# Patient Record
Sex: FEMALE | Race: BLACK OR AFRICAN AMERICAN | NOT HISPANIC OR LATINO | Employment: OTHER | URBAN - METROPOLITAN AREA
[De-identification: names, ages, dates, MRNs, and addresses within clinical notes are randomized per-mention and may not be internally consistent; named-entity substitution may affect disease eponyms.]

---

## 2017-03-28 ENCOUNTER — OFFICE VISIT - HEALTHEAST (OUTPATIENT)
Dept: INTERNAL MEDICINE | Facility: CLINIC | Age: 82
End: 2017-03-28

## 2017-03-28 DIAGNOSIS — K04.7 DENTAL INFECTION: ICD-10-CM

## 2017-03-28 DIAGNOSIS — M54.2 CERVICALGIA: ICD-10-CM

## 2017-03-28 DIAGNOSIS — R59.1 LYMPHADENOPATHY: ICD-10-CM

## 2017-03-28 DIAGNOSIS — I10 ESSENTIAL HYPERTENSION WITH GOAL BLOOD PRESSURE LESS THAN 140/90: ICD-10-CM

## 2017-03-29 ENCOUNTER — COMMUNICATION - HEALTHEAST (OUTPATIENT)
Dept: INTERNAL MEDICINE | Facility: CLINIC | Age: 82
End: 2017-03-29

## 2017-05-02 ENCOUNTER — COMMUNICATION - HEALTHEAST (OUTPATIENT)
Dept: INTERNAL MEDICINE | Facility: CLINIC | Age: 82
End: 2017-05-02

## 2017-05-02 DIAGNOSIS — H40.9 GLAUCOMA: ICD-10-CM

## 2017-05-03 ENCOUNTER — COMMUNICATION - HEALTHEAST (OUTPATIENT)
Dept: INTERNAL MEDICINE | Facility: CLINIC | Age: 82
End: 2017-05-03

## 2017-05-03 DIAGNOSIS — H40.9 GLAUCOMA: ICD-10-CM

## 2017-05-26 ENCOUNTER — COMMUNICATION - HEALTHEAST (OUTPATIENT)
Dept: INTERNAL MEDICINE | Facility: CLINIC | Age: 82
End: 2017-05-26

## 2017-05-26 DIAGNOSIS — G47.00 INSOMNIA, UNSPECIFIED: ICD-10-CM

## 2017-06-01 ENCOUNTER — COMMUNICATION - HEALTHEAST (OUTPATIENT)
Dept: SCHEDULING | Facility: CLINIC | Age: 82
End: 2017-06-01

## 2017-06-05 ENCOUNTER — OFFICE VISIT - HEALTHEAST (OUTPATIENT)
Dept: INTERNAL MEDICINE | Facility: CLINIC | Age: 82
End: 2017-06-05

## 2017-06-05 DIAGNOSIS — R59.9 SWOLLEN LYMPH NODES: ICD-10-CM

## 2017-06-05 DIAGNOSIS — G50.0 TRIGEMINAL NEURALGIA OF RIGHT SIDE OF FACE: ICD-10-CM

## 2017-06-05 ASSESSMENT — MIFFLIN-ST. JEOR: SCORE: 1394.37

## 2017-06-30 ENCOUNTER — COMMUNICATION - HEALTHEAST (OUTPATIENT)
Dept: INTERNAL MEDICINE | Facility: CLINIC | Age: 82
End: 2017-06-30

## 2017-06-30 DIAGNOSIS — H40.9 GLAUCOMA: ICD-10-CM

## 2017-08-17 ENCOUNTER — COMMUNICATION - HEALTHEAST (OUTPATIENT)
Dept: INTERNAL MEDICINE | Facility: CLINIC | Age: 82
End: 2017-08-17

## 2017-08-17 DIAGNOSIS — H40.9 GLAUCOMA: ICD-10-CM

## 2017-08-17 DIAGNOSIS — G47.00 INSOMNIA, UNSPECIFIED: ICD-10-CM

## 2017-09-14 ENCOUNTER — COMMUNICATION - HEALTHEAST (OUTPATIENT)
Dept: INTERNAL MEDICINE | Facility: CLINIC | Age: 82
End: 2017-09-14

## 2017-09-21 ENCOUNTER — COMMUNICATION - HEALTHEAST (OUTPATIENT)
Dept: INTERNAL MEDICINE | Facility: CLINIC | Age: 82
End: 2017-09-21

## 2017-09-21 DIAGNOSIS — H40.9 GLAUCOMA: ICD-10-CM

## 2017-11-02 ENCOUNTER — COMMUNICATION - HEALTHEAST (OUTPATIENT)
Dept: INTERNAL MEDICINE | Facility: CLINIC | Age: 82
End: 2017-11-02

## 2017-11-06 ENCOUNTER — COMMUNICATION - HEALTHEAST (OUTPATIENT)
Dept: INTERNAL MEDICINE | Facility: CLINIC | Age: 82
End: 2017-11-06

## 2017-11-06 DIAGNOSIS — H40.9 GLAUCOMA: ICD-10-CM

## 2017-11-22 ENCOUNTER — COMMUNICATION - HEALTHEAST (OUTPATIENT)
Dept: INTERNAL MEDICINE | Facility: CLINIC | Age: 82
End: 2017-11-22

## 2017-11-22 DIAGNOSIS — G47.00 INSOMNIA: ICD-10-CM

## 2017-11-22 DIAGNOSIS — H40.9 GLAUCOMA: ICD-10-CM

## 2018-01-08 ENCOUNTER — COMMUNICATION - HEALTHEAST (OUTPATIENT)
Dept: INTERNAL MEDICINE | Facility: CLINIC | Age: 83
End: 2018-01-08

## 2018-01-08 DIAGNOSIS — I10 ESSENTIAL HYPERTENSION: ICD-10-CM

## 2018-02-21 ENCOUNTER — COMMUNICATION - HEALTHEAST (OUTPATIENT)
Dept: INTERNAL MEDICINE | Facility: CLINIC | Age: 83
End: 2018-02-21

## 2018-02-21 DIAGNOSIS — G47.00 INSOMNIA: ICD-10-CM

## 2018-02-21 DIAGNOSIS — H40.9 GLAUCOMA: ICD-10-CM

## 2018-04-17 ENCOUNTER — COMMUNICATION - HEALTHEAST (OUTPATIENT)
Dept: INTERNAL MEDICINE | Facility: CLINIC | Age: 83
End: 2018-04-17

## 2018-04-17 DIAGNOSIS — H40.9 GLAUCOMA: ICD-10-CM

## 2018-05-09 ENCOUNTER — OFFICE VISIT - HEALTHEAST (OUTPATIENT)
Dept: INTERNAL MEDICINE | Facility: CLINIC | Age: 83
End: 2018-05-09

## 2018-05-09 DIAGNOSIS — M54.81 OCCIPITAL NEURALGIA OF LEFT SIDE: ICD-10-CM

## 2018-05-09 DIAGNOSIS — M25.552 HIP PAIN, ACUTE, LEFT: ICD-10-CM

## 2018-05-18 ENCOUNTER — HOSPITAL ENCOUNTER (OUTPATIENT)
Dept: MRI IMAGING | Facility: CLINIC | Age: 83
Discharge: HOME | End: 2018-05-18
Attending: INTERNAL MEDICINE

## 2018-05-18 DIAGNOSIS — M25.552 HIP PAIN, ACUTE, LEFT: ICD-10-CM

## 2018-05-19 ENCOUNTER — COMMUNICATION - HEALTHEAST (OUTPATIENT)
Dept: INTERNAL MEDICINE | Facility: CLINIC | Age: 83
End: 2018-05-19

## 2018-06-01 ENCOUNTER — COMMUNICATION - HEALTHEAST (OUTPATIENT)
Dept: INTERNAL MEDICINE | Facility: CLINIC | Age: 83
End: 2018-06-01

## 2018-06-01 DIAGNOSIS — G47.00 INSOMNIA: ICD-10-CM

## 2018-06-06 ENCOUNTER — COMMUNICATION - HEALTHEAST (OUTPATIENT)
Dept: INTERNAL MEDICINE | Facility: CLINIC | Age: 83
End: 2018-06-06

## 2018-06-06 DIAGNOSIS — H40.9 GLAUCOMA: ICD-10-CM

## 2018-06-19 ENCOUNTER — COMMUNICATION - HEALTHEAST (OUTPATIENT)
Dept: INTERNAL MEDICINE | Facility: CLINIC | Age: 83
End: 2018-06-19

## 2018-06-19 ENCOUNTER — RECORDS - HEALTHEAST (OUTPATIENT)
Dept: ADMINISTRATIVE | Facility: OTHER | Age: 83
End: 2018-06-19

## 2018-06-19 ENCOUNTER — OFFICE VISIT - HEALTHEAST (OUTPATIENT)
Dept: INTERNAL MEDICINE | Facility: CLINIC | Age: 83
End: 2018-06-19

## 2018-06-19 DIAGNOSIS — I10 ESSENTIAL HYPERTENSION WITH GOAL BLOOD PRESSURE LESS THAN 140/90: ICD-10-CM

## 2018-06-19 DIAGNOSIS — M19.91 PRIMARY OSTEOARTHRITIS, UNSPECIFIED SITE: ICD-10-CM

## 2018-06-19 DIAGNOSIS — G62.9 NEUROPATHY: ICD-10-CM

## 2018-06-19 LAB
C REACTIVE PROTEIN LHE: 2.4 MG/DL (ref 0–0.8)
ERYTHROCYTE [DISTWIDTH] IN BLOOD BY AUTOMATED COUNT: 13.3 % (ref 11–14.5)
ERYTHROCYTE [SEDIMENTATION RATE] IN BLOOD BY WESTERGREN METHOD: 19 MM/HR (ref 0–20)
HBA1C MFR BLD: 6.6 % (ref 3.5–6)
HCT VFR BLD AUTO: 38.9 % (ref 35–47)
HGB BLD-MCNC: 13 G/DL (ref 12–16)
MCH RBC QN AUTO: 26.7 PG (ref 27–34)
MCHC RBC AUTO-ENTMCNC: 33.3 G/DL (ref 32–36)
MCV RBC AUTO: 80 FL (ref 80–100)
PLATELET # BLD AUTO: 253 THOU/UL (ref 140–440)
PMV BLD AUTO: 7.8 FL (ref 7–10)
RBC # BLD AUTO: 4.86 MILL/UL (ref 3.8–5.4)
TSH SERPL DL<=0.005 MIU/L-ACNC: 1.71 UIU/ML (ref 0.3–5)
URATE SERPL-MCNC: 4.4 MG/DL (ref 2–7.5)
VIT B12 SERPL-MCNC: 491 PG/ML (ref 213–816)
WBC: 3.7 THOU/UL (ref 4–11)

## 2018-06-20 ENCOUNTER — COMMUNICATION - HEALTHEAST (OUTPATIENT)
Dept: INTERNAL MEDICINE | Facility: CLINIC | Age: 83
End: 2018-06-20

## 2018-06-20 LAB — 25(OH)D3 SERPL-MCNC: 41.3 NG/ML (ref 30–80)

## 2018-07-13 ENCOUNTER — RECORDS - HEALTHEAST (OUTPATIENT)
Dept: ADMINISTRATIVE | Facility: OTHER | Age: 83
End: 2018-07-13

## 2018-07-25 ENCOUNTER — COMMUNICATION - HEALTHEAST (OUTPATIENT)
Dept: INTERNAL MEDICINE | Facility: CLINIC | Age: 83
End: 2018-07-25

## 2018-07-25 DIAGNOSIS — H40.9 GLAUCOMA: ICD-10-CM

## 2018-08-21 ENCOUNTER — COMMUNICATION - HEALTHEAST (OUTPATIENT)
Dept: INTERNAL MEDICINE | Facility: CLINIC | Age: 83
End: 2018-08-21

## 2018-08-21 DIAGNOSIS — H40.9 GLAUCOMA: ICD-10-CM

## 2018-09-06 ENCOUNTER — COMMUNICATION - HEALTHEAST (OUTPATIENT)
Dept: INTERNAL MEDICINE | Facility: CLINIC | Age: 83
End: 2018-09-06

## 2018-09-06 DIAGNOSIS — G47.00 INSOMNIA: ICD-10-CM

## 2018-09-07 ENCOUNTER — RECORDS - HEALTHEAST (OUTPATIENT)
Dept: ADMINISTRATIVE | Facility: OTHER | Age: 83
End: 2018-09-07

## 2018-09-10 ENCOUNTER — COMMUNICATION - HEALTHEAST (OUTPATIENT)
Dept: INTERNAL MEDICINE | Facility: CLINIC | Age: 83
End: 2018-09-10

## 2018-09-10 DIAGNOSIS — G47.00 INSOMNIA: ICD-10-CM

## 2018-09-16 ENCOUNTER — COMMUNICATION - HEALTHEAST (OUTPATIENT)
Dept: INTERNAL MEDICINE | Facility: CLINIC | Age: 83
End: 2018-09-16

## 2018-09-18 ENCOUNTER — COMMUNICATION - HEALTHEAST (OUTPATIENT)
Dept: INTERNAL MEDICINE | Facility: CLINIC | Age: 83
End: 2018-09-18

## 2018-09-26 ENCOUNTER — COMMUNICATION - HEALTHEAST (OUTPATIENT)
Dept: INTERNAL MEDICINE | Facility: CLINIC | Age: 83
End: 2018-09-26

## 2018-10-05 ENCOUNTER — COMMUNICATION - HEALTHEAST (OUTPATIENT)
Dept: INTERNAL MEDICINE | Facility: CLINIC | Age: 83
End: 2018-10-05

## 2018-10-05 DIAGNOSIS — G47.00 INSOMNIA: ICD-10-CM

## 2018-10-05 DIAGNOSIS — I10 ESSENTIAL HYPERTENSION: ICD-10-CM

## 2018-10-05 DIAGNOSIS — H40.9 GLAUCOMA: ICD-10-CM

## 2018-10-09 ENCOUNTER — RECORDS - HEALTHEAST (OUTPATIENT)
Dept: ADMINISTRATIVE | Facility: OTHER | Age: 83
End: 2018-10-09

## 2018-12-01 ENCOUNTER — SURGERY - HEALTHEAST (OUTPATIENT)
Dept: SURGERY | Facility: CLINIC | Age: 83
End: 2018-12-01

## 2018-12-01 ENCOUNTER — ANESTHESIA - HEALTHEAST (OUTPATIENT)
Dept: SURGERY | Facility: CLINIC | Age: 83
End: 2018-12-01

## 2018-12-01 ASSESSMENT — MIFFLIN-ST. JEOR: SCORE: 1394.37

## 2018-12-03 ASSESSMENT — MIFFLIN-ST. JEOR: SCORE: 1383.03

## 2018-12-04 ENCOUNTER — COMMUNICATION - HEALTHEAST (OUTPATIENT)
Dept: SURGERY | Facility: CLINIC | Age: 83
End: 2018-12-04

## 2018-12-04 ENCOUNTER — COMMUNICATION - HEALTHEAST (OUTPATIENT)
Dept: CARE COORDINATION | Facility: CLINIC | Age: 83
End: 2018-12-04

## 2018-12-04 ENCOUNTER — COMMUNICATION - HEALTHEAST (OUTPATIENT)
Dept: SCHEDULING | Facility: CLINIC | Age: 83
End: 2018-12-04

## 2018-12-06 ENCOUNTER — OFFICE VISIT - HEALTHEAST (OUTPATIENT)
Dept: INTERNAL MEDICINE | Facility: CLINIC | Age: 83
End: 2018-12-06

## 2018-12-06 DIAGNOSIS — I10 ESSENTIAL HYPERTENSION WITH GOAL BLOOD PRESSURE LESS THAN 140/90: ICD-10-CM

## 2018-12-06 DIAGNOSIS — E11.9 TYPE 2 DIABETES MELLITUS WITHOUT COMPLICATION, WITHOUT LONG-TERM CURRENT USE OF INSULIN (H): ICD-10-CM

## 2018-12-06 DIAGNOSIS — Z79.52 LONG TERM CURRENT USE OF SYSTEMIC STEROIDS: ICD-10-CM

## 2018-12-06 DIAGNOSIS — M19.91 PRIMARY OSTEOARTHRITIS, UNSPECIFIED SITE: ICD-10-CM

## 2018-12-06 DIAGNOSIS — K21.00 GASTROESOPHAGEAL REFLUX DISEASE WITH ESOPHAGITIS: ICD-10-CM

## 2018-12-06 DIAGNOSIS — K80.01 CALCULUS OF GALLBLADDER WITH ACUTE CHOLECYSTITIS AND OBSTRUCTION: ICD-10-CM

## 2018-12-06 DIAGNOSIS — Z90.49 S/P LAPAROSCOPIC CHOLECYSTECTOMY: ICD-10-CM

## 2018-12-06 DIAGNOSIS — E66.01 MORBID OBESITY (H): ICD-10-CM

## 2018-12-06 DIAGNOSIS — E87.6 HYPOKALEMIA: ICD-10-CM

## 2018-12-06 LAB
ANION GAP SERPL CALCULATED.3IONS-SCNC: 13 MMOL/L (ref 5–18)
BUN SERPL-MCNC: 12 MG/DL (ref 8–28)
CALCIUM SERPL-MCNC: 9.8 MG/DL (ref 8.5–10.5)
CHLORIDE BLD-SCNC: 99 MMOL/L (ref 98–107)
CO2 SERPL-SCNC: 28 MMOL/L (ref 22–31)
CREAT SERPL-MCNC: 0.78 MG/DL (ref 0.6–1.1)
ERYTHROCYTE [DISTWIDTH] IN BLOOD BY AUTOMATED COUNT: 12.4 % (ref 11–14.5)
GFR SERPL CREATININE-BSD FRML MDRD: >60 ML/MIN/1.73M2
GLUCOSE BLD-MCNC: 119 MG/DL (ref 70–125)
HBA1C MFR BLD: 6.3 % (ref 3.5–6)
HCT VFR BLD AUTO: 39.4 % (ref 35–47)
HGB BLD-MCNC: 13 G/DL (ref 12–16)
MCH RBC QN AUTO: 26.6 PG (ref 27–34)
MCHC RBC AUTO-ENTMCNC: 33.1 G/DL (ref 32–36)
MCV RBC AUTO: 80 FL (ref 80–100)
PLATELET # BLD AUTO: 254 THOU/UL (ref 140–440)
PMV BLD AUTO: 7.9 FL (ref 7–10)
POTASSIUM BLD-SCNC: 4 MMOL/L (ref 3.5–5)
RBC # BLD AUTO: 4.9 MILL/UL (ref 3.8–5.4)
SODIUM SERPL-SCNC: 140 MMOL/L (ref 136–145)
WBC: 5.6 THOU/UL (ref 4–11)

## 2018-12-07 ENCOUNTER — COMMUNICATION - HEALTHEAST (OUTPATIENT)
Dept: INTERNAL MEDICINE | Facility: CLINIC | Age: 83
End: 2018-12-07

## 2018-12-11 ENCOUNTER — COMMUNICATION - HEALTHEAST (OUTPATIENT)
Dept: PHARMACY | Facility: CLINIC | Age: 83
End: 2018-12-11

## 2018-12-11 DIAGNOSIS — M19.91 PRIMARY OSTEOARTHRITIS, UNSPECIFIED SITE: ICD-10-CM

## 2018-12-11 DIAGNOSIS — K21.00 GASTROESOPHAGEAL REFLUX DISEASE WITH ESOPHAGITIS: ICD-10-CM

## 2018-12-11 DIAGNOSIS — K81.0 ACUTE CHOLECYSTITIS: ICD-10-CM

## 2018-12-11 DIAGNOSIS — G62.9 NEUROPATHY: ICD-10-CM

## 2019-01-12 ENCOUNTER — COMMUNICATION - HEALTHEAST (OUTPATIENT)
Dept: INTERNAL MEDICINE | Facility: CLINIC | Age: 84
End: 2019-01-12

## 2019-01-12 DIAGNOSIS — G47.00 INSOMNIA: ICD-10-CM

## 2019-04-08 ENCOUNTER — COMMUNICATION - HEALTHEAST (OUTPATIENT)
Dept: INTERNAL MEDICINE | Facility: CLINIC | Age: 84
End: 2019-04-08

## 2019-04-08 DIAGNOSIS — I10 ESSENTIAL HYPERTENSION: ICD-10-CM

## 2019-04-15 ENCOUNTER — COMMUNICATION - HEALTHEAST (OUTPATIENT)
Dept: INTERNAL MEDICINE | Facility: CLINIC | Age: 84
End: 2019-04-15

## 2019-04-15 ENCOUNTER — COMMUNICATION - HEALTHEAST (OUTPATIENT)
Dept: SCHEDULING | Facility: CLINIC | Age: 84
End: 2019-04-15

## 2019-04-15 DIAGNOSIS — G62.9 NEUROPATHY: ICD-10-CM

## 2019-04-15 DIAGNOSIS — G47.00 INSOMNIA: ICD-10-CM

## 2019-04-19 ENCOUNTER — OFFICE VISIT - HEALTHEAST (OUTPATIENT)
Dept: INTERNAL MEDICINE | Facility: CLINIC | Age: 84
End: 2019-04-19

## 2019-04-19 DIAGNOSIS — E11.9 TYPE 2 DIABETES MELLITUS WITHOUT COMPLICATION, WITHOUT LONG-TERM CURRENT USE OF INSULIN (H): ICD-10-CM

## 2019-04-19 DIAGNOSIS — Z90.49 S/P LAPAROSCOPIC CHOLECYSTECTOMY: ICD-10-CM

## 2019-04-19 DIAGNOSIS — Z51.81 ENCOUNTER FOR THERAPEUTIC DRUG LEVEL MONITORING: ICD-10-CM

## 2019-04-19 DIAGNOSIS — B02.8 HERPES ZOSTER WITH COMPLICATION: ICD-10-CM

## 2019-04-19 DIAGNOSIS — G62.9 NEUROPATHY: ICD-10-CM

## 2019-04-19 DIAGNOSIS — I10 ESSENTIAL HYPERTENSION WITH GOAL BLOOD PRESSURE LESS THAN 140/90: ICD-10-CM

## 2019-04-19 DIAGNOSIS — F51.01 PRIMARY INSOMNIA: ICD-10-CM

## 2019-04-19 LAB
AMPHETAMINES UR QL SCN: NORMAL
BARBITURATES UR QL: NORMAL
BASOPHILS # BLD AUTO: 0 THOU/UL (ref 0–0.2)
BASOPHILS NFR BLD AUTO: 0 % (ref 0–2)
BENZODIAZ UR QL: NORMAL
CANNABINOIDS UR QL SCN: NORMAL
COCAINE UR QL: NORMAL
CREAT UR-MCNC: 54.1 MG/DL
CREAT UR-MCNC: 54.1 MG/DL
EOSINOPHIL # BLD AUTO: 0.1 THOU/UL (ref 0–0.4)
EOSINOPHIL NFR BLD AUTO: 1 % (ref 0–6)
ERYTHROCYTE [DISTWIDTH] IN BLOOD BY AUTOMATED COUNT: 11.9 % (ref 11–14.5)
HBA1C MFR BLD: 6.1 % (ref 3.5–6)
HCT VFR BLD AUTO: 41.8 % (ref 35–47)
HGB BLD-MCNC: 13.9 G/DL (ref 12–16)
LYMPHOCYTES # BLD AUTO: 1.2 THOU/UL (ref 0.8–4.4)
LYMPHOCYTES NFR BLD AUTO: 18 % (ref 20–40)
MCH RBC QN AUTO: 27.2 PG (ref 27–34)
MCHC RBC AUTO-ENTMCNC: 33.3 G/DL (ref 32–36)
MCV RBC AUTO: 82 FL (ref 80–100)
MICROALBUMIN UR-MCNC: 0.76 MG/DL (ref 0–1.99)
MICROALBUMIN/CREAT UR: 14 MG/G
MONOCYTES # BLD AUTO: 0.2 THOU/UL (ref 0–0.9)
MONOCYTES NFR BLD AUTO: 3 % (ref 2–10)
NEUTROPHILS # BLD AUTO: 4.9 THOU/UL (ref 2–7.7)
NEUTROPHILS NFR BLD AUTO: 77 % (ref 50–70)
OPIATES UR QL SCN: NORMAL
OXYCODONE UR QL: NORMAL
PCP UR QL SCN: NORMAL
PLATELET # BLD AUTO: 362 THOU/UL (ref 140–440)
PMV BLD AUTO: 7.7 FL (ref 7–10)
RBC # BLD AUTO: 5.12 MILL/UL (ref 3.8–5.4)
WBC: 6.4 THOU/UL (ref 4–11)

## 2019-04-19 ASSESSMENT — MIFFLIN-ST. JEOR: SCORE: 1371.69

## 2019-04-23 ENCOUNTER — COMMUNICATION - HEALTHEAST (OUTPATIENT)
Dept: INTERNAL MEDICINE | Facility: CLINIC | Age: 84
End: 2019-04-23

## 2019-05-07 ENCOUNTER — RECORDS - HEALTHEAST (OUTPATIENT)
Dept: HEALTH INFORMATION MANAGEMENT | Facility: CLINIC | Age: 84
End: 2019-05-07

## 2019-07-09 ENCOUNTER — COMMUNICATION - HEALTHEAST (OUTPATIENT)
Dept: INTERNAL MEDICINE | Facility: CLINIC | Age: 84
End: 2019-07-09

## 2019-07-09 DIAGNOSIS — M19.91 PRIMARY OSTEOARTHRITIS, UNSPECIFIED SITE: ICD-10-CM

## 2019-10-11 ENCOUNTER — COMMUNICATION - HEALTHEAST (OUTPATIENT)
Dept: INTERNAL MEDICINE | Facility: CLINIC | Age: 84
End: 2019-10-11

## 2019-10-11 DIAGNOSIS — H40.9 GLAUCOMA: ICD-10-CM

## 2019-10-22 ENCOUNTER — COMMUNICATION - HEALTHEAST (OUTPATIENT)
Dept: INTERNAL MEDICINE | Facility: CLINIC | Age: 84
End: 2019-10-22

## 2019-10-22 DIAGNOSIS — G47.00 INSOMNIA: ICD-10-CM

## 2019-10-24 ENCOUNTER — COMMUNICATION - HEALTHEAST (OUTPATIENT)
Dept: SCHEDULING | Facility: CLINIC | Age: 84
End: 2019-10-24

## 2019-10-24 DIAGNOSIS — B02.9 HERPES ZOSTER WITHOUT COMPLICATION: ICD-10-CM

## 2019-11-04 ENCOUNTER — COMMUNICATION - HEALTHEAST (OUTPATIENT)
Dept: SCHEDULING | Facility: CLINIC | Age: 84
End: 2019-11-04

## 2019-11-05 ENCOUNTER — OFFICE VISIT - HEALTHEAST (OUTPATIENT)
Dept: INTERNAL MEDICINE | Facility: CLINIC | Age: 84
End: 2019-11-05

## 2019-11-05 ENCOUNTER — COMMUNICATION - HEALTHEAST (OUTPATIENT)
Dept: TELEHEALTH | Facility: CLINIC | Age: 84
End: 2019-11-05

## 2019-11-05 DIAGNOSIS — J20.9 ACUTE BRONCHITIS, UNSPECIFIED ORGANISM: ICD-10-CM

## 2019-11-05 DIAGNOSIS — E11.9 TYPE 2 DIABETES MELLITUS WITHOUT COMPLICATION, WITHOUT LONG-TERM CURRENT USE OF INSULIN (H): ICD-10-CM

## 2019-11-05 DIAGNOSIS — M54.6 ACUTE RIGHT-SIDED THORACIC BACK PAIN: ICD-10-CM

## 2019-11-05 ASSESSMENT — MIFFLIN-ST. JEOR: SCORE: 1362.61

## 2019-11-15 ENCOUNTER — COMMUNICATION - HEALTHEAST (OUTPATIENT)
Dept: INTERNAL MEDICINE | Facility: CLINIC | Age: 84
End: 2019-11-15

## 2019-11-15 DIAGNOSIS — I10 ESSENTIAL HYPERTENSION: ICD-10-CM

## 2019-11-19 ENCOUNTER — OFFICE VISIT - HEALTHEAST (OUTPATIENT)
Dept: INTERNAL MEDICINE | Facility: CLINIC | Age: 84
End: 2019-11-19

## 2019-11-19 DIAGNOSIS — M19.91 PRIMARY OSTEOARTHRITIS, UNSPECIFIED SITE: ICD-10-CM

## 2019-11-19 DIAGNOSIS — B37.2 YEAST DERMATITIS: ICD-10-CM

## 2019-11-19 DIAGNOSIS — E11.40 TYPE 2 DIABETES MELLITUS WITH DIABETIC NEUROPATHY, WITHOUT LONG-TERM CURRENT USE OF INSULIN (H): ICD-10-CM

## 2019-11-19 DIAGNOSIS — E23.0: ICD-10-CM

## 2019-11-19 DIAGNOSIS — E66.01 MORBID OBESITY (H): ICD-10-CM

## 2019-11-19 DIAGNOSIS — K21.00 GASTROESOPHAGEAL REFLUX DISEASE WITH ESOPHAGITIS: ICD-10-CM

## 2019-11-19 DIAGNOSIS — J20.9 ACUTE BRONCHITIS, UNSPECIFIED ORGANISM: ICD-10-CM

## 2019-11-19 DIAGNOSIS — I10 ESSENTIAL HYPERTENSION: ICD-10-CM

## 2019-11-19 LAB
ERYTHROCYTE [SEDIMENTATION RATE] IN BLOOD BY WESTERGREN METHOD: 20 MM/HR (ref 0–20)
HBA1C MFR BLD: 6.2 % (ref 3.5–6)

## 2019-11-19 ASSESSMENT — MIFFLIN-ST. JEOR: SCORE: 1346.17

## 2019-11-20 ENCOUNTER — COMMUNICATION - HEALTHEAST (OUTPATIENT)
Dept: INTERNAL MEDICINE | Facility: CLINIC | Age: 84
End: 2019-11-20

## 2019-11-20 LAB
C REACTIVE PROTEIN LHE: 5.6 MG/DL (ref 0–0.8)
RHEUMATOID FACT SERPL-ACNC: <15 IU/ML (ref 0–30)

## 2019-11-21 LAB
ANA SER QL: 1 U
B BURGDOR IGG+IGM SER QL: 0.05 INDEX VALUE
CCP AB SER IA-ACNC: <0.5 U/ML

## 2019-11-22 ENCOUNTER — COMMUNICATION - HEALTHEAST (OUTPATIENT)
Dept: INTERNAL MEDICINE | Facility: CLINIC | Age: 84
End: 2019-11-22

## 2019-12-03 ENCOUNTER — COMMUNICATION - HEALTHEAST (OUTPATIENT)
Dept: INTERNAL MEDICINE | Facility: CLINIC | Age: 84
End: 2019-12-03

## 2019-12-03 DIAGNOSIS — I10 ESSENTIAL HYPERTENSION: ICD-10-CM

## 2020-01-21 ENCOUNTER — RECORDS - HEALTHEAST (OUTPATIENT)
Dept: GENERAL RADIOLOGY | Facility: CLINIC | Age: 85
End: 2020-01-21

## 2020-01-21 ENCOUNTER — OFFICE VISIT - HEALTHEAST (OUTPATIENT)
Dept: RHEUMATOLOGY | Facility: CLINIC | Age: 85
End: 2020-01-21

## 2020-01-21 DIAGNOSIS — G89.29 OTHER CHRONIC PAIN: ICD-10-CM

## 2020-01-21 DIAGNOSIS — M54.2 CHRONIC NECK PAIN: ICD-10-CM

## 2020-01-21 DIAGNOSIS — M65.30 TRIGGER FINGER OF RIGHT HAND, UNSPECIFIED FINGER: ICD-10-CM

## 2020-01-21 DIAGNOSIS — M79.641 PAIN IN RIGHT HAND: ICD-10-CM

## 2020-01-21 DIAGNOSIS — M25.552 CHRONIC LEFT HIP PAIN: ICD-10-CM

## 2020-01-21 DIAGNOSIS — G89.29 CHRONIC LEFT HIP PAIN: ICD-10-CM

## 2020-01-21 DIAGNOSIS — M25.511 CHRONIC PAIN OF BOTH SHOULDERS: ICD-10-CM

## 2020-01-21 DIAGNOSIS — M25.512 CHRONIC PAIN OF BOTH SHOULDERS: ICD-10-CM

## 2020-01-21 DIAGNOSIS — M54.2 CERVICALGIA: ICD-10-CM

## 2020-01-21 DIAGNOSIS — M25.512 PAIN IN LEFT SHOULDER: ICD-10-CM

## 2020-01-21 DIAGNOSIS — H04.123 DRY EYES: ICD-10-CM

## 2020-01-21 DIAGNOSIS — G89.29 CHRONIC PAIN OF BOTH SHOULDERS: ICD-10-CM

## 2020-01-21 DIAGNOSIS — M79.641 PAIN OF RIGHT HAND: ICD-10-CM

## 2020-01-21 DIAGNOSIS — M25.511 PAIN IN RIGHT SHOULDER: ICD-10-CM

## 2020-01-21 DIAGNOSIS — M15.9 OSTEOARTHRITIS OF MULTIPLE JOINTS, UNSPECIFIED OSTEOARTHRITIS TYPE: ICD-10-CM

## 2020-01-21 DIAGNOSIS — G89.29 CHRONIC NECK PAIN: ICD-10-CM

## 2020-01-21 LAB
ALBUMIN SERPL-MCNC: 4.1 G/DL (ref 3.5–5)
ALT SERPL W P-5'-P-CCNC: 17 U/L (ref 0–45)
AST SERPL W P-5'-P-CCNC: 24 U/L (ref 0–40)
C REACTIVE PROTEIN LHE: 2 MG/DL (ref 0–0.8)
CREAT SERPL-MCNC: 0.77 MG/DL (ref 0.6–1.1)
ERYTHROCYTE [DISTWIDTH] IN BLOOD BY AUTOMATED COUNT: 12.1 % (ref 11–14.5)
ERYTHROCYTE [SEDIMENTATION RATE] IN BLOOD BY WESTERGREN METHOD: 19 MM/HR (ref 0–20)
GFR SERPL CREATININE-BSD FRML MDRD: >60 ML/MIN/1.73M2
HCT VFR BLD AUTO: 38.8 % (ref 35–47)
HGB BLD-MCNC: 13.1 G/DL (ref 12–16)
MCH RBC QN AUTO: 28 PG (ref 27–34)
MCHC RBC AUTO-ENTMCNC: 33.8 G/DL (ref 32–36)
MCV RBC AUTO: 83 FL (ref 80–100)
PLATELET # BLD AUTO: 272 THOU/UL (ref 140–440)
PMV BLD AUTO: 7.6 FL (ref 7–10)
RBC # BLD AUTO: 4.67 MILL/UL (ref 3.8–5.4)
URATE SERPL-MCNC: 4.9 MG/DL (ref 2–7.5)
WBC: 3.9 THOU/UL (ref 4–11)

## 2020-01-21 ASSESSMENT — MIFFLIN-ST. JEOR: SCORE: 1356.26

## 2020-01-23 LAB — ANCA IGG TITR SER IF: NORMAL {TITER}

## 2020-01-28 ENCOUNTER — COMMUNICATION - HEALTHEAST (OUTPATIENT)
Dept: RHEUMATOLOGY | Facility: CLINIC | Age: 85
End: 2020-01-28

## 2020-01-28 LAB
SS-A/RO AUTOANTIBODIES - HISTORICAL: 1 EU
SS-B/LA AUTOANTIBODIES - HISTORICAL: 1 EU

## 2020-02-17 ENCOUNTER — AMBULATORY - HEALTHEAST (OUTPATIENT)
Dept: RHEUMATOLOGY | Facility: CLINIC | Age: 85
End: 2020-02-17

## 2020-02-17 DIAGNOSIS — G89.29 CHRONIC PAIN OF BOTH SHOULDERS: ICD-10-CM

## 2020-02-17 DIAGNOSIS — M25.512 CHRONIC PAIN OF BOTH SHOULDERS: ICD-10-CM

## 2020-02-17 DIAGNOSIS — M25.511 CHRONIC PAIN OF BOTH SHOULDERS: ICD-10-CM

## 2020-02-25 ENCOUNTER — COMMUNICATION - HEALTHEAST (OUTPATIENT)
Dept: INTERNAL MEDICINE | Facility: CLINIC | Age: 85
End: 2020-02-25

## 2020-02-25 DIAGNOSIS — G47.00 INSOMNIA: ICD-10-CM

## 2020-03-10 ENCOUNTER — AMBULATORY - HEALTHEAST (OUTPATIENT)
Dept: NURSING | Facility: CLINIC | Age: 85
End: 2020-03-10

## 2020-03-10 ENCOUNTER — COMMUNICATION - HEALTHEAST (OUTPATIENT)
Dept: TELEHEALTH | Facility: CLINIC | Age: 85
End: 2020-03-10

## 2020-03-10 ENCOUNTER — AMBULATORY - HEALTHEAST (OUTPATIENT)
Dept: LAB | Facility: CLINIC | Age: 85
End: 2020-03-10

## 2020-03-10 DIAGNOSIS — Z23 FLU VACCINE NEED: ICD-10-CM

## 2020-03-10 DIAGNOSIS — M25.511 CHRONIC PAIN OF BOTH SHOULDERS: ICD-10-CM

## 2020-03-10 DIAGNOSIS — M25.512 CHRONIC PAIN OF BOTH SHOULDERS: ICD-10-CM

## 2020-03-10 DIAGNOSIS — G89.29 CHRONIC PAIN OF BOTH SHOULDERS: ICD-10-CM

## 2020-03-10 LAB
BASOPHILS # BLD AUTO: 0 THOU/UL (ref 0–0.2)
BASOPHILS NFR BLD AUTO: 1 % (ref 0–2)
EOSINOPHIL # BLD AUTO: 0.1 THOU/UL (ref 0–0.4)
EOSINOPHIL NFR BLD AUTO: 2 % (ref 0–6)
ERYTHROCYTE [DISTWIDTH] IN BLOOD BY AUTOMATED COUNT: 12.6 % (ref 11–14.5)
HCT VFR BLD AUTO: 40 % (ref 35–47)
HGB BLD-MCNC: 12.9 G/DL (ref 12–16)
LYMPHOCYTES # BLD AUTO: 2 THOU/UL (ref 0.8–4.4)
LYMPHOCYTES NFR BLD AUTO: 39 % (ref 20–40)
MCH RBC QN AUTO: 26.4 PG (ref 27–34)
MCHC RBC AUTO-ENTMCNC: 32.2 G/DL (ref 32–36)
MCV RBC AUTO: 82 FL (ref 80–100)
MONOCYTES # BLD AUTO: 0.4 THOU/UL (ref 0–0.9)
MONOCYTES NFR BLD AUTO: 8 % (ref 2–10)
NEUTROPHILS # BLD AUTO: 2.5 THOU/UL (ref 2–7.7)
NEUTROPHILS NFR BLD AUTO: 50 % (ref 50–70)
PLATELET # BLD AUTO: 262 THOU/UL (ref 140–440)
PMV BLD AUTO: 7.7 FL (ref 7–10)
RBC # BLD AUTO: 4.89 MILL/UL (ref 3.8–5.4)
WBC: 5 THOU/UL (ref 4–11)

## 2020-03-23 ENCOUNTER — COMMUNICATION - HEALTHEAST (OUTPATIENT)
Dept: RHEUMATOLOGY | Facility: CLINIC | Age: 85
End: 2020-03-23

## 2020-05-21 ENCOUNTER — COMMUNICATION - HEALTHEAST (OUTPATIENT)
Dept: RHEUMATOLOGY | Facility: CLINIC | Age: 85
End: 2020-05-21

## 2020-06-20 ENCOUNTER — COMMUNICATION - HEALTHEAST (OUTPATIENT)
Dept: INTERNAL MEDICINE | Facility: CLINIC | Age: 85
End: 2020-06-20

## 2020-06-20 DIAGNOSIS — G47.00 INSOMNIA: ICD-10-CM

## 2020-07-01 ENCOUNTER — COMMUNICATION - HEALTHEAST (OUTPATIENT)
Dept: INTERNAL MEDICINE | Facility: CLINIC | Age: 85
End: 2020-07-01

## 2020-07-01 DIAGNOSIS — K21.00 GASTROESOPHAGEAL REFLUX DISEASE WITH ESOPHAGITIS: ICD-10-CM

## 2020-07-02 RX ORDER — FAMOTIDINE 20 MG/1
TABLET, FILM COATED ORAL
Qty: 180 TABLET | Refills: 1 | Status: SHIPPED | OUTPATIENT
Start: 2020-07-02 | End: 2021-09-09

## 2020-09-30 ENCOUNTER — COMMUNICATION - HEALTHEAST (OUTPATIENT)
Dept: INTERNAL MEDICINE | Facility: CLINIC | Age: 85
End: 2020-09-30

## 2020-09-30 DIAGNOSIS — G47.00 INSOMNIA: ICD-10-CM

## 2020-10-07 ENCOUNTER — OFFICE VISIT - HEALTHEAST (OUTPATIENT)
Dept: INTERNAL MEDICINE | Facility: CLINIC | Age: 85
End: 2020-10-07

## 2020-10-07 DIAGNOSIS — F41.9 ANXIETY: ICD-10-CM

## 2020-10-07 DIAGNOSIS — I10 ESSENTIAL HYPERTENSION: ICD-10-CM

## 2020-10-07 DIAGNOSIS — Z90.49 S/P LAPAROSCOPIC CHOLECYSTECTOMY: ICD-10-CM

## 2020-10-07 DIAGNOSIS — F51.01 PRIMARY INSOMNIA: ICD-10-CM

## 2020-10-07 DIAGNOSIS — E11.40 TYPE 2 DIABETES MELLITUS WITH DIABETIC NEUROPATHY, WITHOUT LONG-TERM CURRENT USE OF INSULIN (H): ICD-10-CM

## 2020-10-07 DIAGNOSIS — K21.00 GASTROESOPHAGEAL REFLUX DISEASE WITH ESOPHAGITIS WITHOUT HEMORRHAGE: ICD-10-CM

## 2020-10-07 DIAGNOSIS — Z23 ENCOUNTER FOR IMMUNIZATION: ICD-10-CM

## 2020-10-07 RX ORDER — HYDROCHLOROTHIAZIDE 25 MG/1
25 TABLET ORAL DAILY
Qty: 90 TABLET | Refills: 3 | Status: SHIPPED | OUTPATIENT
Start: 2020-10-07 | End: 2021-11-19

## 2020-10-07 RX ORDER — LOSARTAN POTASSIUM 100 MG/1
100 TABLET ORAL DAILY
Qty: 90 TABLET | Refills: 3 | Status: SHIPPED | OUTPATIENT
Start: 2020-10-07 | End: 2021-11-19

## 2020-10-07 ASSESSMENT — PATIENT HEALTH QUESTIONNAIRE - PHQ9: SUM OF ALL RESPONSES TO PHQ QUESTIONS 1-9: 6

## 2020-10-13 ENCOUNTER — COMMUNICATION - HEALTHEAST (OUTPATIENT)
Dept: INTERNAL MEDICINE | Facility: CLINIC | Age: 85
End: 2020-10-13

## 2020-10-20 ENCOUNTER — AMBULATORY - HEALTHEAST (OUTPATIENT)
Dept: LAB | Facility: CLINIC | Age: 85
End: 2020-10-20

## 2020-10-20 DIAGNOSIS — F41.9 ANXIETY: ICD-10-CM

## 2020-10-20 DIAGNOSIS — E11.40 TYPE 2 DIABETES MELLITUS WITH DIABETIC NEUROPATHY, WITHOUT LONG-TERM CURRENT USE OF INSULIN (H): ICD-10-CM

## 2020-10-20 LAB
ALBUMIN SERPL-MCNC: 4.1 G/DL (ref 3.5–5)
ALP SERPL-CCNC: 83 U/L (ref 45–120)
ALT SERPL W P-5'-P-CCNC: 16 U/L (ref 0–45)
ANION GAP SERPL CALCULATED.3IONS-SCNC: 8 MMOL/L (ref 5–18)
AST SERPL W P-5'-P-CCNC: 23 U/L (ref 0–40)
BILIRUB SERPL-MCNC: 0.3 MG/DL (ref 0–1)
BUN SERPL-MCNC: 14 MG/DL (ref 8–28)
CALCIUM SERPL-MCNC: 9.2 MG/DL (ref 8.5–10.5)
CHLORIDE BLD-SCNC: 103 MMOL/L (ref 98–107)
CO2 SERPL-SCNC: 30 MMOL/L (ref 22–31)
CREAT SERPL-MCNC: 0.85 MG/DL (ref 0.6–1.1)
GFR SERPL CREATININE-BSD FRML MDRD: >60 ML/MIN/1.73M2
GLUCOSE BLD-MCNC: 79 MG/DL (ref 70–125)
HBA1C MFR BLD: 5.8 %
POTASSIUM BLD-SCNC: 5 MMOL/L (ref 3.5–5)
PROT SERPL-MCNC: 7.1 G/DL (ref 6–8)
SODIUM SERPL-SCNC: 141 MMOL/L (ref 136–145)
TSH SERPL DL<=0.005 MIU/L-ACNC: 1.88 UIU/ML (ref 0.3–5)

## 2020-10-21 ENCOUNTER — COMMUNICATION - HEALTHEAST (OUTPATIENT)
Dept: INTERNAL MEDICINE | Facility: CLINIC | Age: 85
End: 2020-10-21

## 2020-10-21 DIAGNOSIS — H40.9 GLAUCOMA: ICD-10-CM

## 2020-11-01 ENCOUNTER — COMMUNICATION - HEALTHEAST (OUTPATIENT)
Dept: INTERNAL MEDICINE | Facility: CLINIC | Age: 85
End: 2020-11-01

## 2020-11-24 ENCOUNTER — COMMUNICATION - HEALTHEAST (OUTPATIENT)
Dept: INTERNAL MEDICINE | Facility: CLINIC | Age: 85
End: 2020-11-24

## 2020-12-22 ENCOUNTER — COMMUNICATION - HEALTHEAST (OUTPATIENT)
Dept: INTERNAL MEDICINE | Facility: CLINIC | Age: 85
End: 2020-12-22

## 2020-12-22 DIAGNOSIS — H40.9 GLAUCOMA: ICD-10-CM

## 2020-12-22 DIAGNOSIS — G47.00 INSOMNIA: ICD-10-CM

## 2021-01-04 ENCOUNTER — COMMUNICATION - HEALTHEAST (OUTPATIENT)
Dept: ADMINISTRATIVE | Facility: CLINIC | Age: 86
End: 2021-01-04

## 2021-01-04 DIAGNOSIS — G62.9 NEUROPATHY: ICD-10-CM

## 2021-01-11 ENCOUNTER — OFFICE VISIT - HEALTHEAST (OUTPATIENT)
Dept: INTERNAL MEDICINE | Facility: CLINIC | Age: 86
End: 2021-01-11

## 2021-01-11 DIAGNOSIS — R42 VERTIGO: ICD-10-CM

## 2021-01-11 DIAGNOSIS — E66.01 MORBID OBESITY (H): ICD-10-CM

## 2021-01-11 DIAGNOSIS — I10 ESSENTIAL HYPERTENSION WITH GOAL BLOOD PRESSURE LESS THAN 140/90: ICD-10-CM

## 2021-01-11 DIAGNOSIS — E11.40 TYPE 2 DIABETES MELLITUS WITH DIABETIC NEUROPATHY, WITHOUT LONG-TERM CURRENT USE OF INSULIN (H): ICD-10-CM

## 2021-01-11 DIAGNOSIS — I10 ESSENTIAL HYPERTENSION: ICD-10-CM

## 2021-01-11 DIAGNOSIS — E23.0: ICD-10-CM

## 2021-01-11 DIAGNOSIS — F41.9 ANXIETY: ICD-10-CM

## 2021-01-11 DIAGNOSIS — F51.01 PRIMARY INSOMNIA: ICD-10-CM

## 2021-01-11 DIAGNOSIS — Z63.4 DEATH OF HUSBAND: ICD-10-CM

## 2021-01-11 RX ORDER — METOPROLOL SUCCINATE 25 MG/1
25 TABLET, EXTENDED RELEASE ORAL DAILY
Qty: 90 TABLET | Refills: 3 | Status: SHIPPED | OUTPATIENT
Start: 2021-01-11 | End: 2021-11-19

## 2021-01-11 SDOH — SOCIAL STABILITY - SOCIAL INSECURITY: DISSAPEARANCE AND DEATH OF FAMILY MEMBER: Z63.4

## 2021-01-30 ENCOUNTER — COMMUNICATION - HEALTHEAST (OUTPATIENT)
Dept: INTERNAL MEDICINE | Facility: CLINIC | Age: 86
End: 2021-01-30

## 2021-01-30 DIAGNOSIS — H40.9 GLAUCOMA: ICD-10-CM

## 2021-03-18 ENCOUNTER — COMMUNICATION - HEALTHEAST (OUTPATIENT)
Dept: INTERNAL MEDICINE | Facility: CLINIC | Age: 86
End: 2021-03-18

## 2021-03-18 DIAGNOSIS — F51.01 PRIMARY INSOMNIA: ICD-10-CM

## 2021-03-18 DIAGNOSIS — H40.9 GLAUCOMA: ICD-10-CM

## 2021-03-18 DIAGNOSIS — G47.00 INSOMNIA: ICD-10-CM

## 2021-03-18 RX ORDER — ZOLPIDEM TARTRATE 5 MG/1
5 TABLET ORAL AT BEDTIME
Qty: 90 TABLET | Refills: 0 | Status: SHIPPED | OUTPATIENT
Start: 2021-03-18 | End: 2021-08-19

## 2021-03-18 RX ORDER — LORAZEPAM 1 MG/1
TABLET ORAL
Qty: 90 TABLET | Refills: 0 | Status: SHIPPED | OUTPATIENT
Start: 2021-03-18 | End: 2021-08-18

## 2021-04-27 ENCOUNTER — COMMUNICATION - HEALTHEAST (OUTPATIENT)
Dept: INTERNAL MEDICINE | Facility: CLINIC | Age: 86
End: 2021-04-27

## 2021-04-27 DIAGNOSIS — H40.9 GLAUCOMA: ICD-10-CM

## 2021-04-27 RX ORDER — LATANOPROST 50 UG/ML
SOLUTION/ DROPS OPHTHALMIC
Qty: 2.5 ML | Refills: 6 | Status: SHIPPED | OUTPATIENT
Start: 2021-04-27

## 2021-04-28 RX ORDER — LATANOPROST 50 UG/ML
SOLUTION/ DROPS OPHTHALMIC
Qty: 2.5 ML | Refills: 0 | Status: SHIPPED | OUTPATIENT
Start: 2021-04-28 | End: 2021-11-19

## 2021-05-24 ENCOUNTER — RECORDS - HEALTHEAST (OUTPATIENT)
Dept: ADMINISTRATIVE | Facility: CLINIC | Age: 86
End: 2021-05-24

## 2021-05-26 ENCOUNTER — RECORDS - HEALTHEAST (OUTPATIENT)
Dept: ADMINISTRATIVE | Facility: CLINIC | Age: 86
End: 2021-05-26

## 2021-05-26 ASSESSMENT — PATIENT HEALTH QUESTIONNAIRE - PHQ9: SUM OF ALL RESPONSES TO PHQ QUESTIONS 1-9: 6

## 2021-05-27 ENCOUNTER — RECORDS - HEALTHEAST (OUTPATIENT)
Dept: ADMINISTRATIVE | Facility: CLINIC | Age: 86
End: 2021-05-27

## 2021-05-27 NOTE — TELEPHONE ENCOUNTER
"  CC:  \"Swelling problem behind my ear\"      Similar to the spell when seen on 3/28/2017       Spell started last week - affects area behind both ears - \"squishy\" feeling to skin - up to the hairline     > No fevers   > Some earache - \"pain that shoots through\"    > No ringing in ears   > No ballance problems   > No ear drainage   > Normally has bad hearing - nothing worse though vs baseline        Wondering if Dr Roberson would prescribe something for this similar to before     Confirmed pharmacy  And Okay to leave a detailed message on [place]. [phone]        A/P:   > I will message him to see if he would be willing to prescribe something but he may wish to see you in clinic to ensure no change since last visit           Bay Spencer, RN   Triage and Medication Refills    Reason for Disposition    Mild facial swelling (puffiness) and persists > 3 days    Protocols used: FACE SWELLING-A-OH      "

## 2021-05-27 NOTE — TELEPHONE ENCOUNTER
Controlled Substance Refill Request  Medication:   Requested Prescriptions     Pending Prescriptions Disp Refills     LORazepam (ATIVAN) 1 MG tablet [Pharmacy Med Name: LORazepam Oral Tablet 1 MG] 90 tablet 0     Sig: TAKE ONE TABLET BY MOUTH AT BEDTIME     Date Last Fill: 1/14/19  [facility]   Submit electronically to pharmacy  Controlled Substance Agreement on File:   Encounter-Level CSA Scan Date - 11/02/2016:    Scan on 11/11/2016  1:45 PM (below)         Last office visit: Last office visit pertaining to requested medication was 12/6/18.

## 2021-05-27 NOTE — TELEPHONE ENCOUNTER
"Per 3/28/2017 encounter:    \"PLAN:  Patient Instructions   This seems most like leftover inflammation vs infection.       Do blood tests for infection     Begin treatment for inflammation     Medrol dose pack steroid pack for inflammation     Anti inflammatory meloxicam 7.5 mgs once a day, with food for 2 weeks, or until the inflammation is gone\"  "

## 2021-05-27 NOTE — TELEPHONE ENCOUNTER
Refill Approved    Rx renewed per Medication Renewal Policy. Medication was last renewed on 10/8/18.    Christy Kong, [facility] Triage/Med Refill 4/9/2019     Requested Prescriptions   Pending Prescriptions Disp Refills     losartan-hydrochlorothiazide (HYZAAR) 100-25 mg per tablet [Pharmacy Med Name: Losartan Potassium-HCTZ Oral Tablet 100-25 MG] 90 tablet 0     Sig: TAKE ONE TABLET BY MOUTH ONE TIME DAILY       Diuretics/Combination Diuretics Refill Protocol  Passed - 4/8/2019  7:00 AM        Passed - Visit with PCP or prescribing provider visit in past 12 months     Last office visit with prescriber/PCP: 12/6/2018 Js Roberson MD OR same dept: 12/6/2018 Js Roberson MD OR same specialty: 12/6/2018 Js Roberson MD  Last physical: Visit date not found Last MTM visit: Visit date not found   Next visit within 3 mo: Visit date not found  Next physical within 3 mo: Visit date not found  Prescriber OR PCP: Js Roberson MD  Last diagnosis associated with med order: 1. Essential hypertension  - losartan-hydrochlorothiazide (HYZAAR) 100-25 mg per tablet [Pharmacy Med Name: Losartan Potassium-HCTZ Oral Tablet 100-25 MG]; TAKE ONE TABLET BY MOUTH ONE TIME DAILY   Dispense: 90 tablet; Refill: 0    If protocol passes may refill for 12 months if within 3 months of last provider visit (or a total of 15 months).             Passed - Serum Potassium in past 12 months      Lab Results   Component Value Date    Potassium 4.0 12/06/2018             Passed - Serum Sodium in past 12 months      Lab Results   Component Value Date    Sodium 140 12/06/2018             Passed - Blood pressure on file in past 12 months     BP Readings from Last 1 Encounters:   12/06/18 130/82             Passed - Serum Creatinine in past 12 months      Creatinine   Date Value Ref Range Status   12/06/2018 0.78 0.60 - 1.10 mg/dL Final

## 2021-05-28 ENCOUNTER — RECORDS - HEALTHEAST (OUTPATIENT)
Dept: ADMINISTRATIVE | Facility: CLINIC | Age: 86
End: 2021-05-28

## 2021-05-28 NOTE — PROGRESS NOTES
ASSESSMENT:  1. Herpes zoster with complication  Of approximately 1 week duration subclinical but now rash over the last 1-2 days per history.  Complete prednisone.  Add famciclovir.  B12 shot.  Trial of topiramate as gabapentin and effective..  No narcotics  - famciclovir (FAMVIR) 500 MG tablet; Take 1 tablet (500 mg total) by mouth 3 (three) times a day for 7 days.  Dispense: 21 tablet; Refill: 0  - cyanocobalamin injection 1,000 mcg  - omeprazole (PRILOSEC) 20 MG capsule; Take 1 capsule (20 mg total) by mouth daily before breakfast.  Dispense: 90 capsule; Refill: 3  - topiramate (TOPAMAX) 25 MG tablet; Take 1 tablet (25 mg total) by mouth 2 (two) times a day.  Dispense: 60 tablet; Refill: 11  - HM1(CBC and Differential)  - HM1 (CBC with Diff)  - naltrexone (DEPADE) 50 mg tablet; Take 1 tablet (50 mg total) by mouth at bedtime.  Dispense: 90 tablet; Refill: 3    2. Neuropathy (H)  Intermittent but this episode of severe right-sided symptoms certainly suggests zoster    3. Type 2 diabetes mellitus without complication, without long-term current use of insulin (H)  Update labs  - Microalbumin, Random Urine  - Glycosylated Hemoglobin A1c    4. Essential hypertension with goal blood pressure less than 140/90  Above goal in pain    5. S/P laparoscopic cholecystectomy  Review gallbladder removal in December    6. Insomnia  Chronic Ambien and lorazepam  - Drugs of Abuse 1,Urine    7. Encounter for therapeutic drug level monitoring   - Drugs of Abuse 1,Urine      Advised to seek ophthalmologic care immediately if rash progresses around forehead and eyes    PLAN:  Patient Instructions   This is shingles of the facial nerve,     Famciclovir 500 mgs 3 times a day for one week to kill the virus    If the rash comes to the eye, see eye doctor     Stop Prednisone    Vitamin B 12 shot today    Take Omeprazole 20 mgs daily for 2 weeks to protect and treat the stomach acid    topiramate 25 mgs twice a day to soothe and numb the  "nerves    Stop Gabapentin     Resume Naltrexone but a full pill at bedtime instead of one half pill for pain control          Orders Placed This Encounter   Procedures     Microalbumin, Random Urine     Drugs of Abuse 1,Urine     Glycosylated Hemoglobin A1c     HM1 (CBC with Diff)     Medications Discontinued During This Encounter   Medication Reason     LORazepam (ATIVAN) 1 MG tablet Duplicate order     LORazepam (ATIVAN) 1 MG tablet Duplicate order     predniSONE (DELTASONE) 10 mg tablet Duplicate order     gabapentin (NEURONTIN) 300 MG capsule      predniSONE (DELTASONE) 20 MG tablet      naltrexone (DEPADE) 50 mg tablet      Administrations This Visit     cyanocobalamin injection 1,000 mcg     Admin Date  04/19/2019 Action  Given Dose  1000 mcg Route  Intramuscular Administered By  Dahlia Denise CMA              Return in about 4 months (around 8/19/2019) for for a full review of medications and lab testing.      CHIEF COMPLAINT:  Chief Complaint   Patient presents with     Follow-up     Prednisone didn't work       HISTORY OF PRESENT ILLNESS:  Loren is a 83 y.o. female presenting to the clinic today with complaints of neck and head pain    Neck and Head Pain: She has a reoccurring problem where she develops neck swelling and pain, ear pain, and sore throat. It comes and goes. She may have it for a couple of weeks before it goes away and stays away for a while. She does not have any symptoms at all between episodes. She called in a few days ago because she was having an episode and was given a course of prednisone, but it has not helped yet. Prednisone has always seemed to help with past episodes, so she is not sure why it is not helping this time. She has pain from her right shoulder, up her neck, and along the right side of her head into her right eye. It is like it \"splits her head in half.\" The left side of her neck is starting to get \"spongy,\" but she does not have any pain in the left side at all. The " "right side is very painful, and she is unable to lie on that side. The first thing she noticed is that it hurt to swallow. She then developed the swelling and soreness in the neck. She just noticed a slight rash on the back of her neck this morning. She has not injured her neck, head, or shoulder recently. She describes it as a shooting, stinging pain. She was supposed to see her eye doctor this morning. She has not been taking naltrexone lately. She had been taking daily low dose prednisone before the burst of higher dose prednisone was added earlier this week.     Stomach Pain: Her stomach has been feeling raw, and she is concerned the gabapentin could be causing this. She is not currently taking anything for her stomach. She has taken omeprazole and Zantac in the past.     Health Maintenance: She has had the old shingles vaccine in 2007.     REVIEW OF SYSTEMS:   She is not blowing anything out of her sinuses and does not feel like she has a head cold. She has never had a vitamin B12 shot.  All other systems are negative.    PFSH:  Her  has shingles in the past.     TOBACCO USE:  Social History     Tobacco Use   Smoking Status Never Smoker   Smokeless Tobacco Never Used       VITALS:  Vitals:    04/19/19 1506   BP: 166/78   Patient Site: Left Arm   Patient Position: Sitting   Cuff Size: Adult Large   Pulse: 95   SpO2: 97%   Weight: 217 lb (98.4 kg)   Height: 5' 1\" (1.549 m)     Wt Readings from Last 3 Encounters:   04/19/19 217 lb (98.4 kg)   12/06/18 215 lb (97.5 kg)   12/03/18 219 lb 8 oz (99.6 kg)     Body mass index is 41 kg/m .    PHYSICAL EXAM:  Constitutional:  Reveals an alert, pleasant, talkative female. Affect appropriate. Does not appear acutely ill.  Vitals:  Per nursing notes.  Ears: Some cerumen left canal. Right clear.   Eyes: Pupils reactive.   Oropharynx:  Without posterior nasal drainage or thrush.  Neck:  Supple, thyroid not palpable. No lymph adenopathy.   Cardiac:  Regular rate and " rhythm without murmurs, rubs, or gallops. Palpation of the radial pulse regular.  Skin: Papular rash right side of neck at occiput.   Neurologic:  Cranial nerves II-XII intact.     Psychiatric:  Mood appropriate, memory intact.     MEDICATIONS:  Current Outpatient Medications   Medication Sig Dispense Refill     latanoprost (XALATAN) 0.005 % ophthalmic solution INSTILL ONE DROP INTO EACH EYE AT BEDTIME 2.5 mL 12     LORazepam (ATIVAN) 1 MG tablet TAKE ONE TABLET BY MOUTH AT BEDTIME 90 tablet 0     losartan-hydrochlorothiazide (HYZAAR) 100-25 mg per tablet TAKE ONE TABLET BY MOUTH ONE TIME DAILY  90 tablet 1     meloxicam (MOBIC) 15 MG tablet Take 1 tablet (15 mg total) by mouth daily. 30 tablet 11     multivitamin-minerals-lutein (CENTRUM SILVER) tablet Take 1 tablet by mouth daily.       ranitidine (ZANTAC) 150 MG tablet Take 150 mg by mouth daily as needed.              zolpidem (AMBIEN) 10 mg tablet TAKE 1 TABLET (10 MG) BY ORAL ROUTE ONCE DAILY AT BEDTIME AS NEEDED FOR SLEEP 30 tablet 4     famciclovir (FAMVIR) 500 MG tablet Take 1 tablet (500 mg total) by mouth 3 (three) times a day for 7 days. 21 tablet 0     naltrexone (DEPADE) 50 mg tablet Take 1 tablet (50 mg total) by mouth at bedtime. 90 tablet 3     omeprazole (PRILOSEC) 20 MG capsule Take 1 capsule (20 mg total) by mouth daily before breakfast. 90 capsule 3     pantoprazole (PROTONIX) 40 MG tablet Take 1 tablet (40 mg total) by mouth daily before breakfast. 20 tablet 0     topiramate (TOPAMAX) 25 MG tablet Take 1 tablet (25 mg total) by mouth 2 (two) times a day. 60 tablet 11     Current Facility-Administered Medications   Medication Dose Route Frequency Provider Last Rate Last Dose     cyanocobalamin injection 1,000 mcg  1,000 mcg Intramuscular Q30 Days Js Roberson MD   1,000 mcg at 04/19/19 1601       ADDITIONAL HISTORY SUMMARIZED (2): None.  DECISION TO OBTAIN EXTRA INFORMATION (1): None.   RADIOLOGY TESTS (1): None.  LABS (1): Labs from  12/6/2018 reviewed. Labs ordered.   MEDICINE TESTS (1): None.  INDEPENDENT REVIEW (2 each): None.     The visit lasted a total of 23 minutes face to face with the patient. Over 50% of the time was spent counseling and educating the patient about her head and neck pain.    IAllan, am scribing for and in the presence of, Dr. Roberson.    I, Dr. Roberson, personally performed the services described in this documentation, as scribed by Allan Hidalgo in my presence, and it is both accurate and complete.    Total data points: 1

## 2021-05-29 ENCOUNTER — RECORDS - HEALTHEAST (OUTPATIENT)
Dept: ADMINISTRATIVE | Facility: CLINIC | Age: 86
End: 2021-05-29

## 2021-05-30 ENCOUNTER — RECORDS - HEALTHEAST (OUTPATIENT)
Dept: ADMINISTRATIVE | Facility: CLINIC | Age: 86
End: 2021-05-30

## 2021-05-30 VITALS — BODY MASS INDEX: 41.76 KG/M2 | WEIGHT: 221.01 LBS

## 2021-05-30 NOTE — TELEPHONE ENCOUNTER
RN cannot approve Refill Request    RN can NOT refill this medication med is not covered by policy/route to provider. Last office visit: 4/19/2019 Js Roberson MD Last Physical: Visit date not found Last MTM visit: Visit date not found Last visit same specialty: 4/19/2019 Js Roberson MD.  Next visit within 3 mo: Visit date not found  Next physical within 3 mo: Visit date not found      Rosalva Hill, [facility] Triage/Med Refill 7/9/2019    Requested Prescriptions   Pending Prescriptions Disp Refills     meloxicam (MOBIC) 15 MG tablet [Pharmacy Med Name: Meloxicam Oral Tablet 15 MG] 30 tablet 10     Sig: TAKE ONE TABLET BY MOUTH ONE TIME DAILY       There is no refill protocol information for this order

## 2021-05-31 VITALS — HEIGHT: 61 IN | BODY MASS INDEX: 41.91 KG/M2 | WEIGHT: 222 LBS

## 2021-06-02 ENCOUNTER — RECORDS - HEALTHEAST (OUTPATIENT)
Dept: ADMINISTRATIVE | Facility: CLINIC | Age: 86
End: 2021-06-02

## 2021-06-02 VITALS — BODY MASS INDEX: 40.97 KG/M2 | HEIGHT: 61 IN | WEIGHT: 217 LBS

## 2021-06-02 VITALS — BODY MASS INDEX: 40.62 KG/M2 | WEIGHT: 215 LBS

## 2021-06-02 VITALS — WEIGHT: 219.5 LBS | BODY MASS INDEX: 41.44 KG/M2 | HEIGHT: 61 IN

## 2021-06-02 NOTE — TELEPHONE ENCOUNTER
RN cannot approve Refill Request    RN can NOT refill this medication med is not covered by policy/route to provider. Last office visit: 4/19/2019 Js Roberson MD Last Physical: Visit date not found Last MTM visit: Visit date not found Last visit same specialty: 4/19/2019 Js Roberson MD.  Next visit within 3 mo: Visit date not found  Next physical within 3 mo: Visit date not found      Vickie Hermosillo, [facility] Triage/Med Refill 10/11/2019    Requested Prescriptions   Pending Prescriptions Disp Refills     latanoprost (XALATAN) 0.005 % ophthalmic solution [Pharmacy Med Name: Latanoprost Ophthalmic Solution 0.005 %] 2.5 mL 11     Sig: INSTILL ONE DROP INTO EACH EYE AT BEDTIME       There is no refill protocol information for this order

## 2021-06-02 NOTE — TELEPHONE ENCOUNTER
Patient calling. yLla is reporting that she has shingles again, this is the third time, ans she is asking Dr. Roberson for some shingles medication again.  The itching , and puffiness is on her right side  On the back of her right ear, on the bone at the hairline.    Please advise. Patient did not want an appointment , only the medication to stop the shingles now.    Whitney Tyler RN  [facility] Triage/refill nurse      Reason for Disposition    Localized rash present > 7 days    Protocols used: RASH OR REDNESS - [id number]

## 2021-06-02 NOTE — TELEPHONE ENCOUNTER
Controlled Substance Refill Request  Medication:   Requested Prescriptions     Pending Prescriptions Disp Refills     LORazepam (ATIVAN) 1 MG tablet [Pharmacy Med Name: LORazepam Oral Tablet 1 MG] 90 tablet 1     Sig: TAKE ONE TABLET BY MOUTH AT BEDTIME     zolpidem (AMBIEN) 10 mg tablet [Pharmacy Med Name: Zolpidem Tartrate Oral Tablet 10 MG] 30 tablet 3     Sig: TAKE ONE TABLET BY MOUTH AT BEDTIME AS NEEDED FOR SLEEP     Date Last Fill:  Ativan: 02/22/18  Ambien: 09/19/18  Pharmacy: [id number]   Submit electronically to pharmacy  Controlled Substance Agreement on File:   Encounter-Level CSA Scan Date - 11/02/2016:    Scan on 11/11/2016  1:45 PM       Last office visit: 4/19/2019 Js Roberson MD

## 2021-06-03 VITALS
WEIGHT: 215 LBS | HEIGHT: 61 IN | BODY MASS INDEX: 40.59 KG/M2 | OXYGEN SATURATION: 97 % | HEART RATE: 88 BPM | SYSTOLIC BLOOD PRESSURE: 136 MMHG | DIASTOLIC BLOOD PRESSURE: 82 MMHG | TEMPERATURE: 97.7 F

## 2021-06-03 NOTE — TELEPHONE ENCOUNTER
Reason for call:  Patient is calling back stating The clinic called me but my phone dropped the calll.   Information relayed to patient:  No message was found to relay Please return call to caller.   Patient has additional questions:  Yes  If YES, what are your questions/concerns:  Why was I called? Please follow up with patient.  Okay to leave a detailed message?:  Yes

## 2021-06-03 NOTE — TELEPHONE ENCOUNTER
Refill Approved    Rx renewed per Medication Renewal Policy. Medication was last renewed on 4/9/19.    Donita Talbot, [facility] Triage/Med Refill 11/16/2019     Requested Prescriptions   Pending Prescriptions Disp Refills     losartan-hydrochlorothiazide (HYZAAR) 100-25 mg per tablet [Pharmacy Med Name: Losartan Potassium-HCTZ Oral Tablet 100-25 MG] 90 tablet 0     Sig: TAKE ONE TABLET BY MOUTH ONE TIME DAILY       Diuretics/Combination Diuretics Refill Protocol  Passed - 11/15/2019  7:01 AM        Passed - Visit with PCP or prescribing provider visit in past 12 months     Last office visit with prescriber/PCP: 4/19/2019 Js Roberson MD OR same dept: 4/19/2019 Js Roberson MD OR same specialty: 11/5/2019 Sanjeev Mabry MD  Last physical: Visit date not found Last MTM visit: Visit date not found   Next visit within 3 mo: Visit date not found  Next physical within 3 mo: Visit date not found  Prescriber OR PCP: Js Roberson MD  Last diagnosis associated with med order: 1. Essential hypertension  - losartan-hydrochlorothiazide (HYZAAR) 100-25 mg per tablet [Pharmacy Med Name: Losartan Potassium-HCTZ Oral Tablet 100-25 MG]; TAKE ONE TABLET BY MOUTH ONE TIME DAILY   Dispense: 90 tablet; Refill: 0    If protocol passes may refill for 12 months if within 3 months of last provider visit (or a total of 15 months).             Passed - Serum Potassium in past 12 months      Lab Results   Component Value Date    Potassium 4.0 12/06/2018             Passed - Serum Sodium in past 12 months      Lab Results   Component Value Date    Sodium 140 12/06/2018             Passed - Blood pressure on file in past 12 months     BP Readings from Last 1 Encounters:   11/05/19 136/82             Passed - Serum Creatinine in past 12 months      Creatinine   Date Value Ref Range Status   12/06/2018 0.78 0.60 - 1.10 mg/dL Final

## 2021-06-03 NOTE — PATIENT INSTRUCTIONS - HE
Sometimes, a medication like Omeprazole can cause diarrhea.  Maybe it is the Omeprazole causing the diarrhea    Stop Omeprazole for 1-2 months    Use Famotidine 20 mgs twice daily to replace the Omeprazole for acid    Yeast infection from antibiotic     Fluconazole 100 mgs daily for 10 days to kill yeast infection    You have had some elevation of inflammatory blood tests which suggests that you have an inflammatory arthritis    We have a rheumatologist here     We can try another vitamin B 12 shot to see if it helps anything and if it does, that is nerve pain    If we need another antibiotic, we will use Augmentin

## 2021-06-03 NOTE — TELEPHONE ENCOUNTER
Medication Question or Clarification  Who is calling: Pharmacy: [facility]  What medication are you calling about? (include dose and sig)   losartan-hydrochlorothiazide (HYZAAR) 100-25 mg per tablet 90 tablet 3 11/19/2019  No   Sig - Route: Take 1 tablet by mouth daily. - Oral       Who prescribed the medication?: Js Roberson MD   What is your question/concern?: Losartan/HCTZ is current only long term back order. Pharmacy is requesting new separate prescriptions for these medications.     Pharmacy: [id number]  Okay to leave a detailed message?: Yes  Site CMT - Please call the pharmacy to obtain any additional needed information.

## 2021-06-03 NOTE — PROGRESS NOTES
Office Visit - Follow Up   Loren Marie   84 y.o. female    Date of Visit: 11/5/2019    Chief Complaint   Patient presents with     Cough     1-2 wk Sxs: dry/productive cough, dizziness, hoarness in voice. No fever and no sore throat - taken Mucinex         Assessment and Plan   1. Acute bronchitis, unspecified organism  A persistent cough with purulent sputum production.  Check chest x-ray to evaluate for possible underlying pneumonia.  Given duration and comorbidities will treat with doxycycline 100 twice daily for 1 week.  She has taken this in the past.  She may continue with the over-the-counter cough suppressants.  She is overdue for follow-up with Dr. Roberson's I have suggested she follow-up with him.  - XR Chest 2 Views  - doxycycline (VIBRA-TABS) 100 MG tablet; Take 1 tablet (100 mg total) by mouth 2 (two) times a day for 7 days.  Dispense: 14 tablet; Refill: 0    2. Acute right-sided thoracic back pain  Likely musculoskeletal.  Await chest x-ray.  - XR Chest 2 Views    3. Type 2 diabetes mellitus without complication, without long-term current use of insulin (H)  Unclear control.  Recent A1c's have actually been in the low 6 range.  I have urged close follow-up with Dr. Roberson in the near future.        Return in about 2 weeks (around 11/19/2019) for Recheck.     History of Present Illness   This 84 y.o. old nice patient of Dr. Roberson's with a history of diabetes comes in today as an urgent add-on for evaluation of cough with right-sided thoracic back pain.  Its been present for about 2 weeks.  Daughter has been giving her some Mucinex but she is just not getting better.  She is having night sweats but no known fevers.  She denies shortness of breath.  She is been under a lot of stress due to an ill  who is been in the hospital for months now.  She has not had any sick contacts.  She has a history of bronchitis.  Non-smoker.  Is not checking her sugars.  Has not seen Dr. Roberson in many  "months.    Review of Systems: A comprehensive review of systems was negative except as noted.     Medications, Allergies and Problem List   Reviewed, reconciled and updated  Post Discharge Medication Reconciliation Status:      Physical Exam   General Appearance:   Pleasant woman in no distress.  O2 sats noted.  HEENT is unremarkable.  Neck is supple without adenopathy.  Lungs are clear bilaterally with no wheezes or crackles.  Heart is regular.    /82 (Patient Site: Right Arm, Patient Position: Sitting, Cuff Size: Adult Regular)   Pulse 88   Temp 97.7  F (36.5  C)   Ht 5' 1\" (1.549 m)   Wt 215 lb (97.5 kg)   SpO2 97%   BMI 40.62 kg/m           Additional Information   Current Outpatient Medications   Medication Sig Dispense Refill     latanoprost (XALATAN) 0.005 % ophthalmic solution INSTILL ONE DROP INTO EACH EYE AT BEDTIME 2.5 mL 11     LORazepam (ATIVAN) 1 MG tablet TAKE ONE TABLET BY MOUTH AT BEDTIME 90 tablet 1     losartan-hydrochlorothiazide (HYZAAR) 100-25 mg per tablet TAKE ONE TABLET BY MOUTH ONE TIME DAILY  90 tablet 1     multivitamin-minerals-lutein (CENTRUM SILVER) tablet Take 1 tablet by mouth daily.       omeprazole (PRILOSEC) 20 MG capsule Take 1 capsule (20 mg total) by mouth daily before breakfast. 90 capsule 3     zolpidem (AMBIEN) 10 mg tablet TAKE ONE TABLET BY MOUTH AT BEDTIME AS NEEDED FOR SLEEP 30 tablet 1     doxycycline (VIBRA-TABS) 100 MG tablet Take 1 tablet (100 mg total) by mouth 2 (two) times a day for 7 days. 14 tablet 0     Current Facility-Administered Medications   Medication Dose Route Frequency Provider Last Rate Last Dose     cyanocobalamin injection 1,000 mcg  1,000 mcg Intramuscular Q30 Days Js Roberson MD   1,000 mcg at 04/19/19 1601     Allergies   Allergen Reactions     Aspirin (Tartrazine Only) Other (See Comments)     Abdominal pain     Codeine Nausea And Vomiting     Social History     Tobacco Use     Smoking status: Never Smoker     Smokeless " tobacco: Never Used   Substance Use Topics     Alcohol use: No     Frequency: Never     Drug use: No       Review and/or order of clinical lab tests:  Review and/or order of radiology tests:  Review and/or order of medicine tests:  Discussion of test results with performing physician:  Decision to obtain old records and/or obtain history from someone other than the patient:  Review and summarization of old records and/or obtaining history from someone other than the patient and.or discussion of case with another health care provider:  Independent visualization of image, tracing or specimen itself:    Time: not applicable     Sanjeev Mabry MD

## 2021-06-03 NOTE — PROGRESS NOTES
ASSESSMENT:  1. Essential hypertension  Blood pressure elevated which she blames on stress.  No change in medication at this time  - losartan-hydrochlorothiazide (HYZAAR) 100-25 mg per tablet; Take 1 tablet by mouth daily.  Dispense: 90 tablet; Refill: 3    2. Type 2 diabetes mellitus with diabetic neuropathy, without long-term current use of insulin (H)  Update labs.  Taking no diabetic medication  - Glycosylated Hemoglobin A1c    3. Morbid obesity (H)  Consider trial of topiramate    4. Yeast dermatitis  Under axillae after doxycycline.  Add oral fluconazole, but could expect this to worsen with new antibiotics  - fluconazole (DIFLUCAN) 100 MG tablet; Take 1 tablet (100 mg total) by mouth daily for 10 days.  Dispense: 10 tablet; Refill: 0    5. Acute bronchitis, unspecified organism  Chest x-ray clear 2 weeks ago.  Suspect sinus as source.  Will treat with Augmentin  - amoxicillin-clavulanate (AUGMENTIN) 500-125 mg per tablet; Take 1 tablet (500 mg total) by mouth 2 (two) times a day for 10 days.  Dispense: 20 tablet; Refill: 1    6. Primary osteoarthritis, unspecified site  Reviewed previous history of elevated inflammatory markers.  Some component of osteoarthritis by base of thumbs.  Some component of rheumatoid with MCP involvement.  Some component of neuropathic.  Trial of B12 shot.  Expand lab screening and repeat inflammatory markers.  Place rheumatology referral.  Unable to tolerate anti-inflammatories  - Antinuclear Antibodies Screen (KIANA)  - Rheumatoid Factor Quant  - Ambulatory referral to Rheumatology  - CCP Antibodies  - Lyme Antibody Cascade  - C-Reactive Protein  - Erythrocyte Sedimentation Rate    7. Gastroesophageal reflux disease with esophagitis  Reviewed series of endoscopies dating back 10 years.  Reviewed 1 dilation of stricture.  Reviewed nonspecific gastropathy and negative for Helicobacter.  Trial of decreased intensity medication considering microscopic colitis as diarrhea  - famotidine  (PEPCID) 20 MG tablet; Take 1 tablet (20 mg total) by mouth 2 (two) times a day.  Dispense: 60 tablet; Refill: 1    8.  Neuropathy.  Of hand and leg.  Trial of B12 shot with more attention to results.  One shot previously done in April 9.  Empty sella.  Reviewed MRI from 2007.  Up-to-date reviewed.  No functional hormone deficiencies and should not relate to current pains    PLAN:  Patient Instructions   Sometimes, a medication like Omeprazole can cause diarrhea.  Maybe it is the Omeprazole causing the diarrhea    Stop Omeprazole for 1-2 months    Use Famotidine 20 mgs twice daily to replace the Omeprazole for acid    Yeast infection from antibiotic     Fluconazole 100 mgs daily for 10 days to kill yeast infection    You have had some elevation of inflammatory blood tests which suggests that you have an inflammatory arthritis    We have a rheumatologist here     We can try another vitamin B 12 shot to see if it helps anything and if it does, that is nerve pain    If we need another antibiotic, we will use Augmentin         Orders Placed This Encounter   Procedures     Glycosylated Hemoglobin A1c     Antinuclear Antibodies Screen (KIANA)     Rheumatoid Factor Quant     CCP Antibodies     Lyme Antibody Cascade     C-Reactive Protein     Erythrocyte Sedimentation Rate     Ambulatory referral to Rheumatology     Referral Priority:   Routine     Referral Type:   Consultation     Referral Reason:   Evaluation and Treatment     Requested Specialty:   Rheumatology     Number of Visits Requested:   1     Medications Discontinued During This Encounter   Medication Reason     omeprazole (PRILOSEC) 20 MG capsule      losartan-hydrochlorothiazide (HYZAAR) 100-25 mg per tablet Reorder     Administrations This Visit     cyanocobalamin injection 1,000 mcg     Admin Date  11/19/2019 Action  Given Dose  1000 mcg Route  Intramuscular Administered By  Chyna Muñoz LPN              Return in about 4 months (around 3/19/2020) for for  "a full review of everything we did today.      CHIEF COMPLAINT:  Chief Complaint   Patient presents with     Follow-up     2 wk f/u       HISTORY OF PRESENT ILLNESS:  Loren is a 84 y.o. female presenting to the clinic today for a two weeks follow up to a cough. Loren notes that her cough has been present for almost three weeks. She presented to [facility] clinic on 11/5/2019 for treatment of a cough and acute right side rib pain. She states she tried  Mucinex at home but was just not getting better.  Her symptoms included night sweats but no known fevers or shortness of breath. Loren had a Chest Xray and work up which was normal. The chest XR was clear and showed no signs of pneumonia. She was given a course of doxycycline 100 twice daily for 1 week. Loren states the medication gave her a yeast infection under her arms and she would like some medication to relieve that, preferably a pill. It did help her cough and made it more productive but the right rib pain is still present on movement. The mucus build up is yellow and Loren denies seeing  any of blood. She notes she has a history of bronchitis but its never lasted this long.     Joint pain: Loren states she constantly has pain all over her body. At night she sometimes takes an ibuprofen, which allows her to sleep with out discomfit. She confirms she does not use it too often because it irritates her stomach. To that point she does not take Advil because she feels it eats her stomach. Other than ibuprofen, gabapentin has worked for Loren but it makes her feel \"woosey\". She notes her most problematic areas are her shoulders metacarpales, and knuckles but discomfort is also present in the elbows and legs. There is some tingling down in her left foot but she attributes it to nerve damaged done by a physician who gave her an epidural in the knee. When Loren wakes up she states her fingers are completely numb and despite having three " "prior metacarpal injuries believes the symptom is related to architis.     Cholecystectomy: it has almost been a year since Loren surgery and she states she is now a pro at the gallbladder. At this point there is not anymore indigestion however she has to closely monitor food intake. She not longer drinks coffee and greasy foods when planning to be out for the day. She did not elaborate on her normal  bowels but notes that when she eats those problems meals has to defecate immediately and \"empties out\".       REVIEW OF SYSTEMS:   Denies post nasal drainage, rhinorrhea, congestion, sore throat, headache, sinus pressure, psoriasics muscle spasms, fever, and hemoptysis  Admits to weight loss, cough, phlegm, arthritis, nerve irritation,righ hand pain, bilateral shoulder pain, bilateral knee pain, left ear pressure and chills.   All other systems are negative.    PFSH:  Notes to be dealing with a lot of stress this last four months with her husbands care at his nursing home.     TOBACCO USE:  Social History     Tobacco Use   Smoking Status Never Smoker   Smokeless Tobacco Never Used       VITALS:  Vitals:    11/19/19 1603 11/19/19 1610   BP: 172/80 164/76   Patient Site: Left Arm Left Arm   Patient Position: Sitting Sitting   Cuff Size: Adult Large Adult Large   Pulse: 86    Resp: 16    SpO2: 95%    Weight: 211 lb 6 oz (95.9 kg)    Height: 5' 1\" (1.549 m)      Wt Readings from Last 3 Encounters:   11/19/19 211 lb 6 oz (95.9 kg)   11/05/19 215 lb (97.5 kg)   04/19/19 217 lb (98.4 kg)     Body mass index is 39.94 kg/m .    PHYSICAL EXAM:  Constitutional:  Reveals pleasant, talkative woman. Affect appropriate.  Vitals:  Per nursing notes.  Ears:  Clear.  Oropharynx:  Without posterior nasal drainage or thrush.  Neck:  Supple, thyroid not palpable.  Cardiac:  Regular rate and rhythm without murmurs, rubs, or gallops. Carotids without bruits. Legs without edema. Palpation of the radial pulse regular.  Lungs: Clear.  " Respiratory effort normal.  Psychiatric:  Mood appropriate, memory intact.     MEDICATIONS:  Current Outpatient Medications   Medication Sig Dispense Refill     latanoprost (XALATAN) 0.005 % ophthalmic solution INSTILL ONE DROP INTO EACH EYE AT BEDTIME 2.5 mL 11     LORazepam (ATIVAN) 1 MG tablet TAKE ONE TABLET BY MOUTH AT BEDTIME 90 tablet 1     multivitamin-minerals-lutein (CENTRUM SILVER) tablet Take 1 tablet by mouth daily.       zolpidem (AMBIEN) 10 mg tablet TAKE ONE TABLET BY MOUTH AT BEDTIME AS NEEDED FOR SLEEP 30 tablet 1     amoxicillin-clavulanate (AUGMENTIN) 500-125 mg per tablet Take 1 tablet (500 mg total) by mouth 2 (two) times a day for 10 days. 20 tablet 1     famotidine (PEPCID) 20 MG tablet Take 1 tablet (20 mg total) by mouth 2 (two) times a day. 60 tablet 1     fluconazole (DIFLUCAN) 100 MG tablet Take 1 tablet (100 mg total) by mouth daily for 10 days. 10 tablet 0     losartan-hydrochlorothiazide (HYZAAR) 100-25 mg per tablet Take 1 tablet by mouth daily. 90 tablet 3     Current Facility-Administered Medications   Medication Dose Route Frequency Provider Last Rate Last Dose     cyanocobalamin injection 1,000 mcg  1,000 mcg Intramuscular Q30 Days Js Roberson MD   1,000 mcg at 11/19/19 1658       ADDITIONAL HISTORY SUMMARIZED (2): Reviewed 11/5/2019 [facility] office visit for cough.  DECISION TO OBTAIN EXTRA INFORMATION (1): None.   RADIOLOGY TESTS (1): Chest x-ray clear.  MRI from 2007 shows empty sella which should not relate.  LABS (1): Reviewed and ordered .  MEDICINE TESTS (1): None.  INDEPENDENT REVIEW (2 each): None.     The visit lasted a total of 42 minutes face to face with the patient. Over 50% of the time was spent counseling and educating the patient about joint pain.    IEmily, am scribing for and in the presence of, Dr. Roberson.    I, Dr. Roberson, personally performed the services described in this documentation, as scribed by Emily Mark in my presence,  and it is both accurate and complete.    Total data points: 4

## 2021-06-03 NOTE — TELEPHONE ENCOUNTER
Patient having back pain. Hurts under shoulder blades on right side.  Coughing up phlegm. Pain x 2 weeks.  Recently getting over shingles.    Also on phone is caregiver.    Denies chest pain, shortness of breath and fever.    Patient says she has had pneumonia in past.    Transferred to scheduling for an appointment today or tomorrow as her family will need to bring her in.    Linda Rasheed, RN, [facility] Nurse Triage/Med Refills RN     Reason for Disposition    SEVERE back pain    Protocols used: BACK PAIN-A-[place]

## 2021-06-04 VITALS
HEIGHT: 61 IN | RESPIRATION RATE: 16 BRPM | HEART RATE: 86 BPM | SYSTOLIC BLOOD PRESSURE: 164 MMHG | OXYGEN SATURATION: 95 % | WEIGHT: 211.38 LBS | DIASTOLIC BLOOD PRESSURE: 76 MMHG | BODY MASS INDEX: 39.91 KG/M2

## 2021-06-04 VITALS
HEIGHT: 61 IN | SYSTOLIC BLOOD PRESSURE: 142 MMHG | WEIGHT: 213.6 LBS | HEART RATE: 76 BPM | DIASTOLIC BLOOD PRESSURE: 64 MMHG | BODY MASS INDEX: 40.33 KG/M2

## 2021-06-05 NOTE — PROGRESS NOTES
Loren Marie who presents today with a chief complaint of  Consult (OA )      Joint Pains: Yes  Location: multiple joint, mainly RT shoulder and hands  Onset: Chronic 54 years, right hand worse over the past year  Intensity: 5-6 /10  AM Stiffness: yes, 0 Minutes  Alleviating/Aggravating Factors: Maci Medications helpful  Tolerating Meds: Yes tolerate med.   Other:      ROS:  Patient denies having: +persistent dry eyes, +dry mouth, recurrent oral ulcers, patchy alopecia, +active rashes off/on, photosensitivity, history of psoriasis, active chest pain, active shortness of breath, active cough, active dysuria, history of kidney stones, active abdominal pain, active diarrhea, history of hematochezia,+ active dysphagia, history of peptic ulcer disease, history of HIV, tuberculosis, hepatitis B or C, Lyme disease, seizure history, raynaud's, active documented fevers, recent infections , +difficulty sleeping or chronic unrefreshing sleep, involuntary weight loss, loss of appetite, excessive fatigue, depression, anxiety,+ recurrent sinus infections 2 months ago, history of inflammatory eye diseases (such as uveitis, scleritis, iritis, etc).     Information gathered by medical assistant incorporated into this note, was reviewed and discussed with the patient.    Problem List:  Patient Active Problem List   Diagnosis     Primary empty sella syndrome (H)     Hyperlipidemia     Dermatitis     Gastroesophageal reflux disease with esophagitis     Lower Back Pain     Lymphedema     Major Depression, Single Episode     Neuropathy     Vertigo     Insomnia     Unspecified glaucoma     Hearing loss     Primary osteoarthritis, unspecified site     Essential hypertension with goal blood pressure less than 140/90     Morbid obesity (H)     Type 2 diabetes mellitus without complication, without long-term current use of insulin (H)     Hypokalemia     S/P laparoscopic cholecystectomy        PMH:   Past Medical History:   Diagnosis Date      HLD (hyperlipidemia)      HTN (hypertension)        Surgical History:  Past Surgical History:   Procedure Laterality Date     LAPAROSCOPIC CHOLECYSTECTOMY N/A 12/1/2018    Procedure: CHOLECYSTECTOMY, LAPAROSCOPIC- with cholangiograms;  Surgeon: Breezy Sykes DO;  Location: [facility];  Service: General     NM KNEE SCOPE,DIAGNOSTIC      Description: Arthroscopy Knee Left;  Recorded: 02/11/2008;     NM KNEE SCOPE,DIAGNOSTIC      Description: Arthroscopy Knee Right;  Recorded: 02/11/2008;     NM REDUCTION OF LARGE BREAST      Description: Breast Surgery Reduction Procedure Bilateral;  Recorded: 11/06/2008;     NM TOTAL ABDOM HYSTERECTOMY      Description: Hysterectomy;  Recorded: 04/11/2011;  Comments: at 32 years of age       Family History:  No family history on file.    Social History:   reports that she has never smoked. She has never used smokeless tobacco. She reports that she does not drink alcohol or use drugs.    Allergies:  Allergies   Allergen Reactions     Aspirin (Tartrazine Only) Other (See Comments)     Abdominal pain     Codeine Nausea And Vomiting        Current Medications:  Current Outpatient Medications   Medication Sig Dispense Refill     cholecalciferol, vitamin D3, (VITAMIN D3 ORAL) Take 2,000 Units by mouth daily.       famotidine (PEPCID) 20 MG tablet Take 1 tablet (20 mg total) by mouth 2 (two) times a day. 60 tablet 1     hydroCHLOROthiazide (HYDRODIURIL) 25 MG tablet Take 1 tablet (25 mg total) by mouth daily. 90 tablet 3     latanoprost (XALATAN) 0.005 % ophthalmic solution INSTILL ONE DROP INTO EACH EYE AT BEDTIME 2.5 mL 11     LORazepam (ATIVAN) 1 MG tablet TAKE ONE TABLET BY MOUTH AT BEDTIME 90 tablet 1     losartan (COZAAR) 100 MG tablet Take 1 tablet (100 mg total) by mouth daily. 90 tablet 3     multivitamin-minerals-lutein (CENTRUM SILVER) tablet Take 1 tablet by mouth daily.       zolpidem (AMBIEN) 10 mg tablet TAKE ONE TABLET BY MOUTH AT BEDTIME AS NEEDED FOR SLEEP  "30 tablet 1     losartan-hydrochlorothiazide (HYZAAR) 100-25 mg per tablet Take 1 tablet by mouth daily. 90 tablet 3     Current Facility-Administered Medications   Medication Dose Route Frequency Provider Last Rate Last Dose     cyanocobalamin injection 1,000 mcg  1,000 mcg Intramuscular Q30 Days Js Roberson MD   1,000 mcg at 11/19/19 1658           Physical Exam:  /64   Pulse 76   Ht 5' 1\" (1.549 m)   Wt 213 lb 9.6 oz (96.9 kg)   BMI 40.36 kg/m    General: A & O x 3 in NAD, overweight.  HEENT: EOMI, Non injected/non icteric sclera, no oral lesions noted  Neck: Supple, no cervical LAD or thyromegaly noted  Derm: No malar rash, psoriatic lesions or nail pitting appreciated  CV: s1s2 with RRR, no rubs appreciated   Lungs: CTA B/L, no wheezing , rales or rhonci appreciated  GI: Soft, NT/ND, no rebound, no guarding noted, no hepatomegally appreciated  MS: Some tenderness noted involving right second and fifth A1 pulley regions on palpation with mild catching sensations appreciated on active flexion of these digits.  Mild tenderness over right second MCP joint, no clear signs of synovitis noted.  Subluxation of right second/third DIP joints noted.  Mild discomfort involving shoulders on passive range of motion testing bilaterally with limitation appreciated.  Mild discomfort involving left hip on passive range of motion testing.  Otherwise patient demonstrated good passive/active ROM over other joints with no warmth, erythema, tenderness or synovitis noted over these joints.  Back: negative straight leg raising bilaterally, mild discomfort involving right C-spine on active range of motion testing of C-spine to the right with some mild limitation noted.  Neuro: 5/5 strength in upper and lower extremities b/l, good sensation b/l,  2+ bicep and patella reflexes b/l      Summary/Assessment:    Pleasant 84-year-old female presents today with chronic pains going on for many years.  Particular bothered by " worsening pain involving right hand over the past year.    Mainly has discomfort involving A1 pulley regions involving right second and fifth digits along with catching sensation described, likely has component of trigger finger playing a role.      Also describes having chronic pains involving right side of neck, shoulders right greater than left.  States cortisone injections in the past have not been helpful.    Occasionally has left hip pain.    Likely has component of degenerative joint disease contributing to her arthralgias.    Noted to have negative/unremarkable: Rheumatoid factor, CCP antibody, KIANA, Lyme antibody.  Elevated CRP with normal ESR.    States has already tried Tylenol, Neurontin, Celebrex, tramadol.    Takes ibuprofen 400 on a periodic basis with partial benefit.    Please see below for management plan.      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    History of right second/third digit hand injury (involving a mixer)    History bilateral knee replacements    Sicca symptoms    Recurrent sinus congestion/sinusitis    Type 2 diabetes    Hard of hearing      Rheumatology medications provided/suggested:    Voltaren gel      Pertinent medication from other providers or from otc (please refer to above for more detailed med list):    HCTZ combination pill  Ibuprofen as needed    Pertinent medications already tried:     Tramadol  Tylenol  Neurontin  Celebrex      Pertinent lab history:    Negative/unremarkable: Rheumatoid factor, CCP antibody, KIANA, Lyme antibody, ESR    Elevated CRP      Pertinent imaging/test history:        Other:    , has 4 children.  Was a housewife.    Denies regular alcohol beverage intake or tobacco use.      Plan:      Deferred cortisone injections for right second and fifth digit trigger  fingers.    Deferred cortisone injections for shoulder pains.    We will add Voltaren gel for right hand pains and right trigger finger pains.    Deferred referral to PT for chronic shoulder neck and left knee pains.  States due to currently being preoccupied taking care of her .  She may reconsider in the future.    Suggested she obtain hearing aids.    Will obtain x-rays of: Shoulders, neck and right hand.    We will obtain some labs today correlate clinically.    Follow-up in 3 to 4 months, sooner as needed.      Procedure note:       Spent 60 minutes with greater than half of this time spent with the patient going over differential diagnosis, prognosis, treatment plan, medication side effects and  answering questions.    Major side effect profile of medications provided/suggested were discussed with the patient.    This note was transcribed using Dragon voice recognition software as a result unintentional grammatical errors or word substitutions may have occurred. Please contact our Rheumatology department if you need any clarification or if you have any related inquiries.    Thank you for referring this patient to our clinic.      Brett Uribe DO....................  1/21/2020   9:11 AM

## 2021-06-05 NOTE — PATIENT INSTRUCTIONS - HE
Summary of Your Rheumatology Visit    Next Appointment:  3-4 Months    Medications:    Please follow directives on pill bottle on how to take medication(s) provided.      Referrals:      Tests:     Please have labs and x-rays that were ordered performed.          Injections:        Other:

## 2021-06-06 NOTE — TELEPHONE ENCOUNTER
Controlled Substance Refill Request  Medication Name:   Requested Prescriptions     Pending Prescriptions Disp Refills     zolpidem (AMBIEN) 10 mg tablet [Pharmacy Med Name: Zolpidem Tartrate Oral Tablet 10 MG] 30 tablet 0     Sig: TAKE ONE TABLET BY MOUTH AT BEDTIME AS NEEDED FOR PAIN     Date Last Fill: 10/22/19  Requested Pharmacy: [facility]  Submit electronically to pharmacy  Controlled Substance Agreement on file:   Encounter-Level CSA Scan Date - 12/06/2018:    Scan on 12/11/2018  5:36 PM           Encounter-Level CSA Scan Date - 11/02/2016:    Scan on 11/11/2016  1:45 PM        Last office visit:  11/19/19

## 2021-06-08 NOTE — TELEPHONE ENCOUNTER
No show 5/21/2020 8:08,8:35 and 8:48 LVM no answer x3 5/13/20 11:20 x1 no answer no vml to leave vm follow up , 5/14/20 7:45 lvm x2

## 2021-06-09 NOTE — PROGRESS NOTES
ASSESSMENT:  1. Cervicalgia  Reviewed history of left upper tooth infection and removal with resultant neck pain.  Suspect more prominent lymphadenopathy was present in posterior distribution.  Rule out coexisting cervicalgia, disc or inflammatory arthropathy.  Very much doubt infection.  Check screening labs.  No further antibiotics.  Begin anti-inflammatories  - Uric Acid  - Basic Metabolic Panel  - HM2(CBC w/o Differential)  - Erythrocyte Sedimentation Rate  - C-Reactive Protein    2. Essential hypertension with goal blood pressure less than 140/90  History confused however I believe she had poor control on diuretic alone.  She somehow has been started on losartan HCTZ but she will check this and let us know    3. Lymphadenopathy  Update labs.  Treat for inflammation    4. Dental infection  History reviewed.  Status post recent course of clindamycin    Anxiety.  Noted    PLAN:  Patient Instructions   This seems most like leftover inflammation vs infection.      Do blood tests for infection    Begin treatment for inflammation    Medrol dose pack steroid pack for inflammation    Anti inflammatory meloxicam 7.5 mgs once a day, with food for 2 weeks, or until the inflammation is gone    Take omeprazole every day for 2 weeks so your stomach is good    My notes say you are NOT taking triamt/hctz, and NOT taking losartan/hctz, and you ARE taking the Valsartan/hctz.  Let me know if that is wrong          Orders Placed This Encounter   Procedures     Uric Acid     Basic Metabolic Panel     HM2(CBC w/o Differential)     Erythrocyte Sedimentation Rate     C-Reactive Protein     Medications Discontinued During This Encounter   Medication Reason     triamterene-hydrochlorothiazide (MAXZIDE-25) 37.5-25 mg per tablet      losartan-hydrochlorothiazide (HYZAAR) 100-25 mg per tablet        No Follow-up on file.    CHIEF COMPLAINT:  Chief Complaint   Patient presents with     Neck Pain     behind ear at the base of the neck had  bone infection in cheek bone        HISTORY OF PRESENT ILLNESS:  Loren is a 81 y.o. female presenting to the clinic today with complaints of jaw and neck pain. She reports that in late January she began to have daily, low grade head aches lateralized to the left. On the first Saturday of February, after about 2 weeks of head aches, she woke with exquisite tooth pain that at the time she rated as a 10 of 10 in terms of pain severity. She was seen at an ER and given amoxicillin, though experienced almost no relief with this. She was then prescribed clindamycin which she thinks dealt with the infection. X-ray revealed an infected tooth and she had the infected tooth removed last week and the swelling has gone down. Unfortunately, the pain in her left jaw and neck has continued. She states that the pain is most acute just under the left ear lobe and radiates down into her neck, sometimes all the way to the left shoulder, and simultaneously radiates into her left temple. She has not taken any pain killers since the pain began. She sleeps on her left side and this has caused some sleep disturbances. She denies any sinus congestion or sinus pressure. She denies any acute neck pain.     Hypertension: She reports that she has been taking either valsartan-HCTZ daily or losartan-HCTZ but cannot recall which one she is taking. She stopped trial of triamterene-HCTZ as it caused her to develop a slight head ache.     REVIEW OF SYSTEMS:   Heart burn: She takes omeprazole as needed.   All other systems are negative.    PFSH:  Reviewed, as below.     TOBACCO USE:  History   Smoking Status     Never Smoker   Smokeless Tobacco     Not on file       VITALS:  Vitals:    03/28/17 1504   BP: 124/62   Patient Site: Left Arm   Patient Position: Sitting   Cuff Size: Adult Large   Pulse: 72   SpO2: 98%   Weight: 221 lb 0.2 oz (100.2 kg)     Wt Readings from Last 3 Encounters:   03/28/17 221 lb 0.2 oz (100.2 kg)   11/02/16 (!) 223 lb (101.2  kg)       PHYSICAL EXAM:  Constitutional:  Reveals an alert, pleasant, somewhat anxious appearing elderly female.  Vitals:  Per nursing notes.  Ears:  Clear.  Oropharynx:  Without posterior nasal drainage or thrush. Missing upper left maxillary tooth.   Neck:  Supple, thyroid not palpable.  Prominent posterior cervical swelling/adenopathy. No specific lymph nodes palpable. Decreased ROM, no erythema, no anterior cervical adenopathy.   Cardiac:  Regular rate and rhythm without murmurs, rubs, or gallops. Legs without edema. Palpation of the radial pulse regular.  Lungs: Clear.  Respiratory effort normal.  Skin:  No rashes.   Neurologic:  Cranial nerves II-XII intact.     Psychiatric:  Mood appropriate, memory intact.     ADDITIONAL HISTORY SUMMARIZED (2): None.  DECISION TO OBTAIN EXTRA INFORMATION (1): None.   RADIOLOGY TESTS (1): None.  LABS (1): Labs ordered.  MEDICINE TESTS (1): None.  INDEPENDENT REVIEW (2 each): None.     The visit lasted a total of 15 minutes face to face with the patient. Over 50% of the time was spent counseling and educating the patient about tooth infections.    IErvin, am scribing for and in the presence of, Dr. Roberson.    IDr. Roberson, personally performed the services described in this documentation, as scribed by Ervin Perales in my presence, and it is both accurate and complete.    MEDICATIONS:  Current Outpatient Prescriptions   Medication Sig Dispense Refill     cholecalciferol, vitamin D3, (VITAMIN D3) 2,000 unit cap Take by mouth.       fluticasone (FLONASE) 50 mcg/actuation nasal spray 1 spray into each nostril bedtime.       latanoprost (XALATAN) 0.005 % ophthalmic solution PLACE 1 DROP IN BOTH EYES AT BEDTIME 2.5 mL 11     LORazepam (ATIVAN) 1 MG tablet TAKE ONE TABLET BY MOUTH AT BEDTIME 90 tablet 1     multivitamin-minerals-lutein (CENTRUM SILVER) tablet Take 1 tablet by mouth daily.       omeprazole (PRILOSEC) 40 MG capsule Take 40 mg by mouth daily as  needed.        meloxicam (MOBIC) 7.5 MG tablet Take 1 tablet (7.5 mg total) by mouth daily for 14 days. 14 tablet 0     methylPREDNISolone (MEDROL DOSEPACK) 4 mg tablet follow package directions 21 tablet 0     triamcinolone (KENALOG) 0.1 % cream Apply to affected area twice daily as needed       valsartan-hydrochlorothiazide (DIOVAN HCT) 80-12.5 mg per tablet Take 1 tablet by mouth daily. 90 tablet 3     No current facility-administered medications for this visit.        Total data points: 1.   TIME IN:3:12 PM  TIME OUT: 3:27 PM

## 2021-06-09 NOTE — TELEPHONE ENCOUNTER
Refill Approved    Rx renewed per Medication Renewal Policy. Medication was last renewed on 11/19/19.    Christy Kong, [facility] Triage/Med Refill 7/2/2020     Requested Prescriptions   Pending Prescriptions Disp Refills     famotidine (PEPCID) 20 MG tablet [Pharmacy Med Name: Famotidine Oral Tablet 20 MG] 60 tablet 0     Sig: TAKE ONE TABLET BY MOUTH TWICE DAILY       GI Medications Refill Protocol Passed - 7/1/2020  4:54 PM        Passed - PCP or prescribing provider visit in last 12 or next 3 months.     Last office visit with prescriber/PCP: 11/19/2019 Js Roberson MD OR same dept: 11/19/2019 Js Roberson MD OR same specialty: 11/19/2019 Js Roberson MD  Last physical: Visit date not found Last MTM visit: Visit date not found   Next visit within 3 mo: Visit date not found  Next physical within 3 mo: Visit date not found  Prescriber OR PCP: Js Roberson MD  Last diagnosis associated with med order: 1. Gastroesophageal reflux disease with esophagitis  - famotidine (PEPCID) 20 MG tablet [Pharmacy Med Name: Famotidine Oral Tablet 20 MG]; TAKE ONE TABLET BY MOUTH TWICE DAILY   Dispense: 60 tablet; Refill: 0    If protocol passes may refill for 12 months if within 3 months of last provider visit (or a total of 15 months).

## 2021-06-09 NOTE — TELEPHONE ENCOUNTER
Medication: Lorazepam  Last Date Filled 3/26/20   pulled: YES    Only PCP Prescribing? : YES  Taken as prescribed from physician notes? YES    CSA in last year: NO  Random Utox in last year: NO  (if any of the above answer NO - schedule with PCP)     Opioids + benzodiazepines? YES  (if the above answer YES - schedule with PCP every 6 months)     Is patient on the Executive Review Team? no      All responses suggest: Refilling prescription       Medication: Zolpidem  Last Date Filled 2/26/20   pulled: YES    Only PCP Prescribing? : YES  Taken as prescribed from physician notes? YES    CSA in last year: NO  Random Utox in last year: NO  (if any of the above answer NO - schedule with PCP)     Opioids + benzodiazepines? NO  (if the above answer YES - schedule with PCP every 6 months)     Is patient on the Executive Review Team? no      All responses suggest: Refilling prescription

## 2021-06-09 NOTE — TELEPHONE ENCOUNTER
Controlled Substance Refill Request  Medication Name:   Requested Prescriptions     Pending Prescriptions Disp Refills     LORazepam (ATIVAN) 1 MG tablet [Pharmacy Med Name: LORazepam Oral Tablet 1 MG] 90 tablet 0     Sig: TAKE ONE TABLET BY MOUTH AT BEDTIME     zolpidem (AMBIEN) 10 mg tablet [Pharmacy Med Name: Zolpidem Tartrate Oral Tablet 10 MG] 30 tablet 0     Sig: TAKE ONE TABLET BY MOUTH AT BEDTIME AS NEEDED FOR PAIN     Date Last Fill: 2/26/2020 for the Ambien and 10/22/2019 for Ativan  Is patient out of medication?:  Yes  Patient notified refills processed within 3 business days:  Yes  Requested Pharmacy: [facility] [id number] [place] [address] [place] [address].  Submit electronically to pharmacy  Controlled Substance Agreement on file:   Encounter-Level CSA Scan Date - 12/06/2018:    Scan on 12/11/2018  5:36 PM           Encounter-Level CSA Scan Date - 11/02/2016:    Scan on 11/11/2016  1:45 PM        Last office visit:  11/19/2019 with PCP

## 2021-06-11 NOTE — PROGRESS NOTES
ASSESSMENT:  1. Trigeminal neuralgia of right side of face  2 separate episodes of neck scalp and jaw pain each occurring after dental procedure.  First episode left jaw.  Second more extensive episode right jaw.  Consider regional pain syndrome, reflex sympathetic dystrophy, trigeminal neuralgia sort of symptom.  Medrol and anti-inflammatory briefly effective for inflammation.  Discussed trial of B12 shot and gabapentin.  Previous trial of gabapentin for cervicalgia ineffective    2. Swollen lymph nodes  Although posterior right ear slightly spongy, no lymphadenopathy noted.  Reviewed lymphocytosis on CBC.  Mastoid process area slightly swollen no lesions  - HM1(CBC and Differential)  - HM1 (CBC with Diff)        PLAN:  Patient Instructions   I see no evidence of infection    Either the tissue and the lymph glands are inflamed from the dental work, or the nerves got irritated from the dental work    I think the trigeminal nerve became irritated    Stop Meloxicam, it is not helping much    One B 12 shot to help insulate the nerve---not now    Gabapentin 100 mgs 3 times a day to help stabilize and soothe the nerve for 1-2 weeks    We could repeat the steroid, but Prednisone instead of Medrol next time        Total data points: 3. Total time:  24 minutes.     Orders Placed This Encounter   Procedures     HM1 (CBC with Diff)     There are no discontinued medications.    No Follow-up on file.    CHIEF COMPLAINT:  Chief Complaint   Patient presents with     Lymphadenopathy     Behind (R) ear x 2weeks       HISTORY OF PRESENT ILLNESS:  Loren is a 81 y.o. female presenting to the clinic today with complaints of swollen lymph nodes. She states that Thursday she experienced sudden lymph node swelling on the right which has since gone down but she notices some residual swelling. She had a tooth extraction in March from the left upper jaw and another tooth extracted on May 12th on the right lower jaw and she wonders if this  "might be the cause of the swelling. She experienced similar lymph node swelling following her left sided tooth extraction and was given prednisone and meloxicam, which helped almost immediatly. Along with the swelling she had symptoms of chills, limited neck mobility, right ear pain, a very painful head ache, and pressure behind her right eye. The neck also remains a bit difficult to rotate, but pain is significantly less than it was a few days ago. She denies any change in temperature perception in her neck. She continues to have some pressure behind her eye and a low level head ache. She began taking prednisone on Thursday and thinks this helped, though not as quickly as it did in late March.     REVIEW OF SYSTEMS:   She denies any rash or skin irritation.   All other systems are negative.    PFSH:  Her  recently dealt with a bout of shingles.      TOBACCO USE:  History   Smoking Status     Never Smoker   Smokeless Tobacco     Not on file       VITALS:  Vitals:    06/05/17 1337   BP: 138/68   Patient Site: Left Arm   Patient Position: Sitting   Cuff Size: Adult Large   Pulse: 71   Resp: 18   SpO2: 95%   Weight: 222 lb (100.7 kg)   Height: 5' 1\" (1.549 m)     Wt Readings from Last 3 Encounters:   06/05/17 222 lb (100.7 kg)   03/28/17 221 lb 0.2 oz (100.2 kg)   11/02/16 (!) 223 lb (101.2 kg)       PHYSICAL EXAM:  Constitutional:  Reveals an alert, pleasant, talkative elderly woman.  Vitals:  Per nursing notes.  Ears:  Clear.  Oropharynx:   No obvious damage or trauma in the mouth. Without posterior nasal drainage or thrush. Mallampati scale 1.   Neck:  Supple, thyroid not palpable. No lymph node palpable.  Spongy area behind the   Skin:   Without rash, bruise, or palpable lesions.  Rheumatologic: Normal joints and nails of the hands.   Neurologic:  Cranial nerves II-XII intact.     Psychiatric:  Mood appropriate, memory intact.     ADDITIONAL HISTORY SUMMARIZED (2): Nurse triage notes from 6/1/0217 regarding " pain. Office notes from 3/28/2017 reviewed regarding lymph adenopathy.  DECISION TO OBTAIN EXTRA INFORMATION (1): None.   RADIOLOGY TESTS (1): None.  LABS (1): Labs ordered.  MEDICINE TESTS (1): None.  INDEPENDENT REVIEW (2 each): None.     The visit lasted a total of 24 minutes face to face with the patient. Over 50% of the time was spent counseling and educating the patient about adenopathy.    IErvin, am scribing for and in the presence of, Dr. Roberson.    I, Dr. Roberson, personally performed the services described in this documentation, as scribed by Ervin Perales in my presence, and it is both accurate and complete.    MEDICATIONS:  Current Outpatient Prescriptions   Medication Sig Dispense Refill     cholecalciferol, vitamin D3, (VITAMIN D3) 2,000 unit cap Take by mouth.       fluticasone (FLONASE) 50 mcg/actuation nasal spray 1 spray into each nostril bedtime.       gabapentin (NEURONTIN) 100 MG capsule Take 100 mg by mouth 3 (three) times a day for 14 days. 42 capsule 0     latanoprost (XALATAN) 0.005 % ophthalmic solution PLACE 1 DROP INTO BOTH EYES AT BEDTIME 2.5 mL 0     LORazepam (ATIVAN) 1 MG tablet TAKE ONE TABLET BY MOUTH AT BEDTIME  90 tablet 0     meloxicam (MOBIC) 15 MG tablet Take 1 tablet (15 mg total) by mouth daily. 30 tablet 11     methylPREDNISolone (MEDROL DOSEPACK) 4 mg tablet follow package directions 21 tablet 0     multivitamin-minerals-lutein (CENTRUM SILVER) tablet Take 1 tablet by mouth daily.       omeprazole (PRILOSEC) 40 MG capsule Take 40 mg by mouth daily as needed.        triamcinolone (KENALOG) 0.1 % cream Apply to affected area twice daily as needed       valsartan-hydrochlorothiazide (DIOVAN HCT) 80-12.5 mg per tablet Take 1 tablet by mouth daily. 90 tablet 3     No current facility-administered medications for this visit.        Total data points: 3  TIME IN:2:09 PM  TIME OUT: 2:33 PM

## 2021-06-12 NOTE — TELEPHONE ENCOUNTER
RN cannot approve Refill Request    RN can NOT refill this medication med is not covered by policy/route to provider. Last office visit: 11/19/2019 Js Roberson MD Last Physical: Visit date not found Last MTM visit: Visit date not found Last visit same specialty: 11/19/2019 Js Roberson MD.  Next visit within 3 mo: Visit date not found  Next physical within 3 mo: Visit date not found      Kelli Fontaine, [facility] Triage/Med Refill 10/21/2020    Requested Prescriptions   Pending Prescriptions Disp Refills     latanoprost (XALATAN) 0.005 % ophthalmic solution [Pharmacy Med Name: Latanoprost Ophthalmic Solution 0.005 %] 2.5 mL 0     Sig: INSTILL ONE DROP INTO EACH EYE AT BEDTIME       There is no refill protocol information for this order

## 2021-06-12 NOTE — TELEPHONE ENCOUNTER
Prior authorization is not needed. Medication is covered under patient's insurance plan. Per pharmacy $70 is cost for a 3 month supply.   Patient will have to contact insurance for questions regarding copayment costs.

## 2021-06-12 NOTE — TELEPHONE ENCOUNTER
Central PA team  [phone]  Pool: HE PA MED ([id number])          PA has been initiated.       PA form completed and faxed insurance via Cover My Meds     Key:   [id number]     Medication:  Cholestyramine 4GM/DOSE powder    Insurance:  [employer]         Response will be received via fax and may take up to 5-10 business days depending on plan

## 2021-06-12 NOTE — TELEPHONE ENCOUNTER
This is actually a four month supply based on directions.     Alternative would be colestipol however this is likely more expensive compared to cholestyramine.

## 2021-06-12 NOTE — TELEPHONE ENCOUNTER
Medication Question or Clarification  Who is calling: Granddajulio Pimentel   What medication are you calling about (include dose and sig)?:   cholestyramine, bulk, Powd 500 g 11 10/7/2020 10/7/2021    Sig - Route: Take 4 g/day by mouth daily. - Oral    Sent to pharmacy as: cholestyramine (bulk) powder      Who prescribed the medication?: Js Roberson MD  What is your question/concern?: This medication is over $70 a month. Please give an alternative or the pills if this is cheaper.  Or start a PA for better coverage.  Requested [facility]acy: [facility]  Okay to leave a detailed message?: Yes

## 2021-06-12 NOTE — PATIENT INSTRUCTIONS - HE
Clarify Ambien 5 mg at bedtime    Clarify lorazepam 1 mg at bedtime    Refill losartan and hydrochlorothiazide    Metoprolol 25 mg daily    Cholestyramine 1 scoop daily    Update labs    Pneumovax

## 2021-06-12 NOTE — PROGRESS NOTES
Patient would like the video invitation sent by: Text to cell phone: [phone]       ASSESSMENT:  1. Primary insomnia  Clarify dose.  Adjust prescription.  Takes with lorazepam already filled earlier this week  - zolpidem (AMBIEN) 5 MG tablet; Take 1 tablet (5 mg total) by mouth at bedtime.  Dispense: 90 tablet; Refill: 1    2. Essential hypertension  Refill losartan and HCTZ.  Consistently above goal.  Add metoprolol  - losartan (COZAAR) 100 MG tablet; Take 1 tablet (100 mg total) by mouth daily.  Dispense: 90 tablet; Refill: 3  - hydroCHLOROthiazide (HYDRODIURIL) 25 MG tablet; Take 1 tablet (25 mg total) by mouth daily.  Dispense: 90 tablet; Refill: 3  - metoprolol succinate (TOPROL-XL) 25 MG; Take 1 tablet (25 mg total) by mouth daily.  Dispense: 90 tablet; Refill: 3    3. Gastroesophageal reflux disease with esophagitis without hemorrhage  Stable on famotidine.  No improvement with diarrhea on omeprazole    4. S/P laparoscopic cholecystectomy  Postcholecystectomy diarrhea versus microscopic colitis.  Trial of cholestyramine  - cholestyramine, bulk, Powd; Take 4 g/day by mouth daily.  Dispense: 500 g; Refill: 11    5. Type 2 diabetes mellitus with diabetic neuropathy, without long-term current use of insulin (H)  Diet controlled.  Update labs  - Comprehensive Metabolic Panel; Future  - Glycosylated Hemoglobin A1c; Future  - Pneumococcal polysaccharide vaccine 23-valent greater than or equal to 3yo subcutaneous/IM; Future    6. Anxiety  Stable on lorazepam and Ambien.  Death of  discussed  - Thyroid Stimulating Hormone (TSH); Future    7. Encounter for immunization   - Pneumococcal polysaccharide vaccine 23-valent greater than or equal to 3yo subcutaneous/IM; Future  Osteoarthritis.  Stable with creams    Preventive Health Care: Had flu shot.  Recommend Pneumovax 23.    PLAN:  Patient Instructions   Clarify Ambien 5 mg at bedtime    Clarify lorazepam 1 mg at bedtime    Refill losartan and  hydrochlorothiazide    Metoprolol 25 mg daily    Cholestyramine 1 scoop daily    Update labs    Pneumovax    Orders Placed This Encounter   Procedures     Pneumococcal polysaccharide vaccine 23-valent greater than or equal to 1yo subcutaneous/IM     Standing Status:   Future     Standing Expiration Date:   10/7/2021     Comprehensive Metabolic Panel     Standing Status:   Future     Standing Expiration Date:   10/7/2021     Glycosylated Hemoglobin A1c     Standing Status:   Future     Standing Expiration Date:   10/7/2021     Thyroid Stimulating Hormone (TSH)     Standing Status:   Future     Standing Expiration Date:   10/7/2021     No follow-ups on file.      CHIEF COMPLAINT:  Chief Complaint   Patient presents with     Medication Management     Discuss medications with pcp     Follow-up       HISTORY OF PRESENT ILLNESS:  Loren is a 85 y.o. female contacting the clinic today via video for review of medication.  She called for refill of lorazepam earlier this week which was filled on .  Her   on  and she reported taking extra tablets a few times so she ran out a week ago.  She did have some withdrawal symptoms.  She takes Ambien 5 mg at bedtime in addition to the lorazepam and if she does not she has nightmares.    Blood pressure is usually over 150 with home monitoring.  She has no peripheral edema    Arthritis has been stable.  No inflammatory disorders were discovered by rheumatology    REVIEW OF SYSTEMS:   Stable arthritis with creams.  No peripheral edema.  Insomnia without medication.  Increased anxiety with death of .  All other systems are negative.    PFSH:  Social History     Social History Narrative      2020     As above.  Granddaughter is in attendance    TOBACCO USE:  Social History     Tobacco Use   Smoking Status Never Smoker   Smokeless Tobacco Never Used       VITALS:  There were no vitals filed for this visit.  Wt Readings from Last 3  Encounters:   01/21/20 213 lb 9.6 oz (96.9 kg)   11/19/19 211 lb 6 oz (95.9 kg)   11/05/19 215 lb (97.5 kg)       PHYSICAL EXAM:  (observations via Video)  Nervous.  Hard of hearing.  Somewhat forgetful?      ADDITIONAL HISTORY SUMMARIZED (2): Reviewed rheumatology note and summary.  Granddaughters have no further concerns  CARE EVERYWHERE/ EXTRA INFORMATION (1):   RADIOLOGY TESTS (1):   LABS (1): Ordered  CARDIOLOGY/MEDICINE TESTS (1):   INDEPENDENT REVIEW (2 each):     Total data points: 3    Video Start time: 2:28 PM  Video End time: 2:58 PM    The visit lasted a total of 30 minutes     CA Intake Time: 7    I have reviewed and updated the patient's Past Medical History, Social History, Family History as appropriate.    MEDICATIONS: Reviewed and updated per CA and MD  Current Outpatient Medications   Medication Sig Dispense Refill     cholecalciferol, vitamin D3, (VITAMIN D3 ORAL) Take 2,000 Units by mouth daily.       diclofenac sodium (VOLTAREN) 1 % Gel Apply half inch over affected hand joints and affected right hand trigger finger regions 2-3 times a day on a when necessary basis. 1 Tube 1     famotidine (PEPCID) 20 MG tablet TAKE ONE TABLET BY MOUTH TWICE DAILY  180 tablet 1     hydroCHLOROthiazide (HYDRODIURIL) 25 MG tablet Take 1 tablet (25 mg total) by mouth daily. 90 tablet 3     latanoprost (XALATAN) 0.005 % ophthalmic solution INSTILL ONE DROP INTO EACH EYE AT BEDTIME 2.5 mL 11     LORazepam (ATIVAN) 1 MG tablet TAKE ONE TABLET BY MOUTH AT BEDTIME 90 tablet 0     losartan (COZAAR) 100 MG tablet Take 1 tablet (100 mg total) by mouth daily. 90 tablet 3     multivitamin-minerals-lutein (CENTRUM SILVER) tablet Take 1 tablet by mouth daily.       zolpidem (AMBIEN) 5 MG tablet Take 1 tablet (5 mg total) by mouth at bedtime. 90 tablet 1     cholestyramine, bulk, Powd Take 4 g/day by mouth daily. 500 g 11     metoprolol succinate (TOPROL-XL) 25 MG Take 1 tablet (25 mg total) by mouth daily. 90 tablet 3  "    Current Facility-Administered Medications   Medication Dose Route Frequency Provider Last Rate Last Dose     cyanocobalamin injection 1,000 mcg  1,000 mcg Intramuscular Q30 Days Js Roberson MD   1,000 mcg at 11/19/19 3824         The patient has been notified of following:     \"This video visit will be conducted via a call between you and your physician/provider. We have found that certain health care needs can be provided without the need for an in-person physical exam.  This service lets us provide the care you need with a video conversation.  If a prescription is necessary we can send it directly to your pharmacy.  If lab work is needed we can place an order for that and you can then stop by our lab to have the test done at a later time.    Video visits are billed at different rates depending on your insurance coverage. Please reach out to your insurance provider with any questions.    If during the course of the call the physician/provider feels a video visit is not appropriate, you will not be charged for this service.\"    Patient has given verbal consent to a Video visit? Yes  How would you like to obtain your AVS? AVS Preference: E-Mail (Inform patient AVS not encrypted with this option).  If dropped by the video visit, the video invitation should be sent to: Text to cell phone: [phone]  Will anyone else be joining your video visit? Yes: Daughter . How would they like to receive their invitation? Text to cell phone: [phone]     Video-Visit Details    Type of service:  Video Visit    Originating Location (pt. Location): Home    Distant Location (provider location):  [facility] INTERNAL MEDICINE     Platform used for Video Visit: [facility]      Js Roberson MD     "

## 2021-06-14 NOTE — TELEPHONE ENCOUNTER
Controlled Substance Refill Request  Medication Name:   Requested Prescriptions     Pending Prescriptions Disp Refills     latanoprost (XALATAN) 0.005 % ophthalmic solution [Pharmacy Med Name: Latanoprost Ophthalmic Solution 0.005 %] 2.5 mL 0     Sig: INSTILL ONE DROP INTO EACH EYE AT BEDTIME     LORazepam (ATIVAN) 1 MG tablet [Pharmacy Med Name: LORazepam Oral Tablet 1 MG] 90 tablet 0     Sig: TAKE ONE TABLET BY MOUTH AT BEDTIME     Date Last Fill: 10/5/20  Requested Pharmacy: [facility]  Submit electronically to pharmacy  Controlled Substance Agreement on file:   Encounter-Level CSA Scan Date - 12/06/2018:    Scan on 12/11/2018  5:36 PM           Encounter-Level CSA Scan Date - 11/02/2016:    Scan on 11/11/2016  1:45 PM        Last office visit:  10/7/20  Beth Ribera RN, MA  [facility]    Triage Nurse Advisor

## 2021-06-14 NOTE — TELEPHONE ENCOUNTER
Pt informed through VM and too call back to schedule phone visit  Please help schedule pt schedule phone visit next week with pcp

## 2021-06-14 NOTE — TELEPHONE ENCOUNTER
Reason for Call:  Other prescription     Detailed comments: Granddaughter Loren calling back to see if the rx's below has been sent to pharmacy. Requesting to hold Topiramate 25 mg twice daily, she states pt doesn't feel good when taking this medication and wants something else. Pt is scheduled for phone visit on 1/11.    Phone Number Patient can be reached at: Other phone number:  [phone]    Best Time: anytime    Can we leave a detailed message on this number?: Yes    Call taken on 1/6/2021 at 12:35 PM by Kulwinder Driscoll

## 2021-06-14 NOTE — TELEPHONE ENCOUNTER
Topiramate 25 mg twice daily    Medrol Dosepak    Phone follow-up 1 week    Reviewed clinic notes 2018 suggest this was occipital neuralgia

## 2021-06-14 NOTE — TELEPHONE ENCOUNTER
Reason for call:  Patient reporting a symptom    Symptom or request: pt has a lump  behind her ear that came back again and also has had a headache, dizziness  Wondering if should have a med or what is next step    Duration (how long have symptoms been present): noticed about 1 week ago    Have you been treated for this before? Yes    Additional comments: na    Phone Number patient can be reached at:  [phone]    Best Time:  any    Can we leave a detailed message on this number: Yes    Call taken on 1/4/2021 at 12:08 PM by Pamela Behr

## 2021-06-14 NOTE — PATIENT INSTRUCTIONS - HE
Continue metoprolol 25 mg daily with refill    Prescription for phenytoin sent-now cancel    Prescription for topiramate sent-now cancel    Continue lorazepam and Ambien    Consider mirtazapine    Medrol Dosepak-done January 4    Labs up-to-date    Obtain Covid vaccine through dental clinic

## 2021-06-14 NOTE — TELEPHONE ENCOUNTER
We can try any member from the seizure medication family for nerve pain    Phenytoin 100 mg at bedtime

## 2021-06-14 NOTE — TELEPHONE ENCOUNTER
RN cannot approve Refill Request    RN can NOT refill this medication med is not covered by policy/route to provider. Last office visit: 11/19/2019 Js Roberson MD Last Physical: Visit date not found Last MTM visit: Visit date not found Last visit same specialty: 11/19/2019 Js Roberson MD.  Next visit within 3 mo: Visit date not found  Next physical within 3 mo: Visit date not found      Enedina Guerrero, [facility] Triage/Med Refill 1/30/2021    Requested Prescriptions   Pending Prescriptions Disp Refills     latanoprost (XALATAN) 0.005 % ophthalmic solution [Pharmacy Med Name: Latanoprost Ophthalmic Solution 0.005 %] 2.5 mL 0     Sig: INSTILL 1 DROP INTO EACH EYE AT BEDTIME       There is no refill protocol information for this order

## 2021-06-14 NOTE — TELEPHONE ENCOUNTER
Left pt's granddaughter detailed message relaying pcp message.  Advised to call back with further questions.

## 2021-06-14 NOTE — PROGRESS NOTES
Loren lopez is a 85 y.o. female being evaluated via a billable phone visit.     Patient would like to be contacted via the following phone number [phone]    Called 12:23 PM.  Straight to voicemail.  Left message     ASSESSMENT:  1. Primary insomnia  Stable on zolpidem and lorazepam.  Briefly requested increase.  Offered Prozac or Remeron which she defers.  Otherwise doing well    2. Anxiety  Stable.  Refuses SSRI    3. Type 2 diabetes mellitus with diabetic neuropathy, without long-term current use of insulin (H)  Up-to-date.  No change in medicines    4. Morbid obesity (H)  Encouraged exercise    5. Primary empty sella syndrome (H)  No symptoms    6. Essential hypertension with goal blood pressure less than 140/90  Stable and tolerating addition of metoprolol.  Instructed and refill    7. Essential hypertension  - metoprolol succinate (TOPROL-XL) 25 MG; Take 1 tablet (25 mg total) by mouth daily.  Dispense: 90 tablet; Refill: 3    8. Vertigo  Recurrent.  Reviewed phone notes, Medrol Dosepak, and topiramate/phenytoin.  Similar to episodes of several years ago.  Notes reviewed.  Cancel anticonvulsants.  Complete Medrol.  Monitor blood pressure      Preventive Health Care:      PLAN:  Patient Instructions   Continue metoprolol 25 mg daily with refill    Prescription for phenytoin sent-now cancel    Prescription for topiramate sent-now cancel    Continue lorazepam and Ambien    Consider mirtazapine    Medrol Dosepak-done January 4    Labs up-to-date    Obtain Covid vaccine through dental clinic    No orders of the defined types were placed in this encounter.    Return in about 4 months (around 5/11/2021) for a phone/video follow up visit.      CHIEF COMPLAINT:  Chief Complaint   Patient presents with     Medication Management       HISTORY OF PRESENT ILLNESS:  Loren is a 85 y.o. female contacting the clinic today via phone for complaints of vertigo.  She called last week reporting dizziness and a lump behind  her neck.  She has done this a few times in the past.  At least 2 office evaluations have shown a slightly spongy area behind the ear but no lymphadenopathy.  She was treated in the past with steroids and anticonvulsants.  She was provided these prescriptions by phone.  She refused topiramate.  A prescription for phenytoin was sent and she refused that also.  She did take the Medrol with marked improvement in the dizziness    She takes zolpidem and lorazepam for sleep.  This is been much worse since the death of her .  She feels anxious but not depressed.  We discussed the addition of further antidepressants which she defers    REVIEW OF SYSTEMS:   No peripheral edema.  No chest pain.  No shortness of breath.    PFSH:  Social History     Social History Narrative      2020        Granddaughter handles things for her and lives with her now        Originally from [place].   at age 20, for 64 years     Granddaughter is a nurse    TOBACCO USE:  Social History     Tobacco Use   Smoking Status Never Smoker   Smokeless Tobacco Never Used       VITALS:  There were no vitals filed for this visit.  Wt Readings from Last 3 Encounters:   20 213 lb 9.6 oz (96.9 kg)   19 211 lb 6 oz (95.9 kg)   19 215 lb (97.5 kg)       PHYSICAL EXAM:  (observations via Phone)  Pleasant, nervous      ADDITIONAL HISTORY SUMMARIZED (2):   RADIOLOGY TESTS (1):   LABS (1): Old labs reviewed from October and up-to-date  CARDIOLOGY/MEDICINE TESTS (1):   INDEPENDENT REVIEW (2 each):       Phone Start time: 12:31 PM  Phone End time: 12:56 PM 25 minutes  Dictation time 9:47 PM to 9:51 PM 4 minutes    The visit lasted a total of 29 minutes       I have reviewed and updated the patient's Past Medical History, Social History, Family History as appropriate.    MEDICATIONS: Reviewed and updated per CA and MD  Current Outpatient Medications   Medication Sig Dispense Refill     cholecalciferol, vitamin D3,  "(VITAMIN D3 ORAL) Take 2,000 Units by mouth daily.       diclofenac sodium (VOLTAREN) 1 % Gel Apply half inch over affected hand joints and affected right hand trigger finger regions 2-3 times a day on a when necessary basis. 1 Tube 1     famotidine (PEPCID) 20 MG tablet TAKE ONE TABLET BY MOUTH TWICE DAILY  180 tablet 1     hydroCHLOROthiazide (HYDRODIURIL) 25 MG tablet Take 1 tablet (25 mg total) by mouth daily. 90 tablet 3     latanoprost (XALATAN) 0.005 % ophthalmic solution INSTILL ONE DROP INTO EACH EYE AT BEDTIME 2.5 mL 0     LORazepam (ATIVAN) 1 MG tablet TAKE ONE TABLET BY MOUTH AT BEDTIME 90 tablet 0     losartan (COZAAR) 100 MG tablet Take 1 tablet (100 mg total) by mouth daily. 90 tablet 3     metoprolol succinate (TOPROL-XL) 25 MG Take 1 tablet (25 mg total) by mouth daily. 90 tablet 3     multivitamin-minerals-lutein (CENTRUM SILVER) tablet Take 1 tablet by mouth daily.       zolpidem (AMBIEN) 5 MG tablet Take 1 tablet (5 mg total) by mouth at bedtime. 90 tablet 1     No current facility-administered medications for this visit.        The patient has been notified of following:     \"This telephone visit will be conducted via a call between you and your physician/provider. We have found that certain health care needs can be provided without the need for a physical exam.  This service lets us provide the care you need with a short phone conversation.  If a prescription is necessary we can send it directly to your pharmacy.  If lab work is needed we can place an order for that and you can then stop by our lab to have the test done at a later time.    Telephone visits are billed at different rates depending on your insurance coverage. During this emergency period, for some insurers they may be billed the same as an in-person visit.  Please reach out to your insurance provider with any questions.    Patient would like to receive their AVS by AVS Preference: Mail a copy.     Js Roberson MD    "

## 2021-06-16 PROBLEM — Z90.49 S/P LAPAROSCOPIC CHOLECYSTECTOMY: Status: ACTIVE | Noted: 2018-12-01

## 2021-06-16 PROBLEM — F41.9 ANXIETY: Status: ACTIVE | Noted: 2020-10-07

## 2021-06-16 PROBLEM — E87.6 HYPOKALEMIA: Status: ACTIVE | Noted: 2018-12-03

## 2021-06-16 PROBLEM — E66.01 MORBID OBESITY (H): Status: ACTIVE | Noted: 2018-12-03

## 2021-06-16 PROBLEM — E11.40 TYPE 2 DIABETES MELLITUS WITH DIABETIC NEUROPATHY, WITHOUT LONG-TERM CURRENT USE OF INSULIN (H): Status: ACTIVE | Noted: 2021-01-11

## 2021-06-16 NOTE — TELEPHONE ENCOUNTER
RN cannot approve Refill Request    RN can NOT refill this medication med is not covered by policy/route to provider. Last office visit: 11/19/2019 Js Roberson MD Last Physical: Visit date not found Last MTM visit: Visit date not found Last visit same specialty: 11/19/2019 Js Roberson MD.  Next visit within 3 mo: Visit date not found  Next physical within 3 mo: Visit date not found      Benitez Rogers, [facility] Triage/Med Refill 3/18/2021    Requested Prescriptions   Pending Prescriptions Disp Refills     latanoprost (XALATAN) 0.005 % ophthalmic solution [Pharmacy Med Name: Latanoprost Ophthalmic Solution 0.005 %] 2.5 mL 0     Sig: INSTILL 1 DROP INTO EACH EYE AT BEDTIME       There is no refill protocol information for this order        LORazepam (ATIVAN) 1 MG tablet [Pharmacy Med Name: LORazepam Oral Tablet 1 MG] 90 tablet 0     Sig: TAKE ONE TABLET BY MOUTH DAILY AT BEDTIME.       Controlled Substances Refill Protocol Failed - 3/18/2021 11:37 AM        Failed - Route all Controlled Substance Requests to Provider        Failed - Patient has controlled substance agreement in past 12 months     Encounter-Level CSA Scan Date - 12/06/2018:    Scan on 12/11/2018  5:36 PM           Encounter-Level CSA Scan Date - 11/02/2016:    Scan on 11/11/2016  1:45 PM               Passed - Visit with PCP or prescribing provider visit in past 12 months      Last office visit with prescriber/PCP: 11/19/2019 Js Roberson MD OR same dept: Visit date not found OR same specialty: 11/19/2019 Js Roberson MD Last physical: Visit date not found Last MTM visit: Visit date not found    Next visit within 3 mo: Visit date not found  Next physical within 3 mo: Visit date not found  Prescriber OR PCP: Js Roberson MD  Last diagnosis associated with med order: 1. Glaucoma  - latanoprost (XALATAN) 0.005 % ophthalmic solution [Pharmacy Med Name: Latanoprost Ophthalmic Solution 0.005 %]; INSTILL 1 DROP  INTO EACH EYE AT BEDTIME  Dispense: 2.5 mL; Refill: 0    2. Insomnia  - LORazepam (ATIVAN) 1 MG tablet [Pharmacy Med Name: LORazepam Oral Tablet 1 MG]; TAKE ONE TABLET BY MOUTH DAILY AT BEDTIME.  Dispense: 90 tablet; Refill: 0    3. Primary insomnia  - zolpidem (AMBIEN) 5 MG tablet [Pharmacy Med Name: Zolpidem Tartrate Oral Tablet 5 MG]; Take 1 tablet (5 mg total) by mouth at bedtime.  Dispense: 90 tablet; Refill: 0                  zolpidem (AMBIEN) 5 MG tablet [Pharmacy Med Name: Zolpidem Tartrate Oral Tablet 5 MG] 90 tablet 0     Sig: Take 1 tablet (5 mg total) by mouth at bedtime.       Controlled Substances Refill Protocol Failed - 3/18/2021 11:37 AM        Failed - Route all Controlled Substance Requests to Provider        Failed - Patient has controlled substance agreement in past 12 months     Encounter-Level CSA Scan Date - 12/06/2018:    Scan on 12/11/2018  5:36 PM           Encounter-Level CSA Scan Date - 11/02/2016:    Scan on 11/11/2016  1:45 PM               Passed - Visit with PCP or prescribing provider visit in past 12 months      Last office visit with prescriber/PCP: 11/19/2019 Js Roberson MD OR same dept: Visit date not found OR same specialty: 11/19/2019 Js Roberson MD Last physical: Visit date not found Last MTM visit: Visit date not found    Next visit within 3 mo: Visit date not found  Next physical within 3 mo: Visit date not found  Prescriber OR PCP: Js Roberson MD  Last diagnosis associated with med order: 1. Glaucoma  - latanoprost (XALATAN) 0.005 % ophthalmic solution [Pharmacy Med Name: Latanoprost Ophthalmic Solution 0.005 %]; INSTILL 1 DROP INTO EACH EYE AT BEDTIME  Dispense: 2.5 mL; Refill: 0    2. Insomnia  - LORazepam (ATIVAN) 1 MG tablet [Pharmacy Med Name: LORazepam Oral Tablet 1 MG]; TAKE ONE TABLET BY MOUTH DAILY AT BEDTIME.  Dispense: 90 tablet; Refill: 0    3. Primary insomnia  - zolpidem (AMBIEN) 5 MG tablet [Pharmacy Med Name: Zolpidem Tartrate  Oral Tablet 5 MG]; Take 1 tablet (5 mg total) by mouth at bedtime.  Dispense: 90 tablet; Refill: 0

## 2021-06-16 NOTE — TELEPHONE ENCOUNTER
Telephone Encounter by Chyna Muñoz LPN at 10/22/2019  5:31 PM     Author: Chyna Muñoz LPN Service: -- Author Type: Licensed Nurse    Filed: 10/22/2019  5:37 PM Encounter Date: 10/22/2019 Status: Signed    : Chyna Muñoz LPN (Licensed Nurse)       Medication: Lorazepam and Zolpidem  Last Date Filled 10/11/19 and 1/12/19   pulled: YES        Only PCP Prescribing? : YES  Taken as prescribed from physician notes? YES    CSA in last year: YES  Random Utox in last year: YES  (if any of the above answer NO - schedule with PCP)     Opioids + benzodiazepines? NO  (if the above answer YES - schedule with PCP every 6 months)     Is patient on the Executive Review Team? NO      All responses suggest: Refilling prescription

## 2021-06-16 NOTE — TELEPHONE ENCOUNTER
Telephone Encounter by Chyna Zavala CMA at 4/18/2019  9:03 AM     Author: Chyna Zavala CMA Service: -- Author Type: Certified Medical Assistant    Filed: 4/18/2019  9:15 AM Encounter Date: 4/15/2019 Status: Signed    : Chyna Zavala CMA (Certified Medical Assistant)       Medication: Lorazepam   Last Date Filled 1/4/19   pulled: YES    Only PCP Prescribing? : YES  Taken as prescribed from physician notes? YES    CSA in last year: YES  Random Utox in last year: NO  (if any of the above answer NO - schedule with PCP)     Opioids + benzodiazepines? NO  (if the above answer YES - schedule with PCP every 6 months)     Is patient on the Executive Review Team? No      All responses suggest: Scheduling with PCP for further intervention      Appointment with pcp 4/19

## 2021-06-16 NOTE — TELEPHONE ENCOUNTER
Controlled Substance Refill Request  Medication Name:   Requested Prescriptions     Pending Prescriptions Disp Refills     latanoprost (XALATAN) 0.005 % ophthalmic solution [Pharmacy Med Name: Latanoprost Ophthalmic Solution 0.005 %] 2.5 mL 0     Sig: INSTILL 1 DROP INTO EACH EYE AT BEDTIME     LORazepam (ATIVAN) 1 MG tablet [Pharmacy Med Name: LORazepam Oral Tablet 1 MG] 90 tablet 0     Sig: TAKE ONE TABLET BY MOUTH DAILY AT BEDTIME.     zolpidem (AMBIEN) 5 MG tablet [Pharmacy Med Name: Zolpidem Tartrate Oral Tablet 5 MG] 90 tablet 0     Sig: Take 1 tablet (5 mg total) by mouth at bedtime.     Date Last Fill: 10/7/20,  12/28/20  Requested Pharmacy: [facility]  Submit electronically to pharmacy  Controlled Substance Agreement on file:   Encounter-Level CSA Scan Date - 12/06/2018:    Scan on 12/11/2018  5:36 PM           Encounter-Level CSA Scan Date - 11/02/2016:    Scan on 11/11/2016  1:45 PM        Last office visit:  1/11/21

## 2021-06-17 NOTE — TELEPHONE ENCOUNTER
Telephone Encounter by Maria A Talbot CMA at 6/22/2020 11:04 AM     Author: Maria A Talbot CMA Service: -- Author Type: Certified Medical Assistant    Filed: 6/22/2020 11:06 AM Encounter Date: 6/20/2020 Status: Signed    : Maria A Talbot CMA (Certified Medical Assistant)

## 2021-06-17 NOTE — PROGRESS NOTES
Wilburtonjam Marie  1935      Assessment and Plan:  1 chronic neck pain with features of occipital neuralgia; will refer to pain clinic to consider injection  2 left hip pain subacute she fell several weeks ago she grimaces, limps, and makes noises when she walks because of this-we will check an MRI to exclude occult fracture  3 multiple chronic symptoms and concerns these were deferred until her primary physician returns; no change in medications are now    Plan: We will set her up for an MRI of the left hip ASAP to make sure not dealing with some type of occult fracture there may be some trochanteric bursitis or related issues; the manifestation of symptoms may be greater than the objective pathology but the MRI will certainly be helpful; refer to pain clinic for injection of occipital area; follow-up with Dr. Roberson who knows her best;    Chief Complaint: Neck pain and recent fall with hip pain; multiple    Visit diagnoses:    1. Occipital neuralgia of left side  Ambulatory Referral for Pain Clinic Procedure   2. Hip pain, acute, left  MR Hip Without Contrast Left     Patient Active Problem List   Diagnosis     Empty Sella Syndrome     Hyperlipidemia     Dermatitis     Esophageal Reflux     Lower Back Pain     Lymphedema     Major Depression, Single Episode     Neuritis     Vertigo     Insomnia     Fatigue     Glaucoma     Cholelithiasis     Hearing loss     Primary osteoarthritis, unspecified site     Essential hypertension with goal blood pressure less than 140/90     No past medical history on file.  Past Surgical History:   Procedure Laterality Date     NV KNEE SCOPE,DIAGNOSTIC      Description: Arthroscopy Knee Left;  Recorded: 02/11/2008;     NV KNEE SCOPE,DIAGNOSTIC      Description: Arthroscopy Knee Right;  Recorded: 02/11/2008;     NV REDUCTION OF LARGE BREAST      Description: Breast Surgery Reduction Procedure Bilateral;  Recorded: 11/06/2008;     NV TOTAL ABDOM HYSTERECTOMY      Description:  Hysterectomy;  Recorded: 04/11/2011;  Comments: at 32 years of age     Social History     Social History     Marital status: Single     Spouse name: N/A     Number of children: N/A     Years of education: N/A     Occupational History     Not on file.     Social History Main Topics     Smoking status: Never Smoker     Smokeless tobacco: Never Used     Alcohol use Not on file     Drug use: Not on file     Sexual activity: Not on file     Other Topics Concern     Not on file     Social History Narrative     No family history on file.    Meds:  Current Outpatient Prescriptions   Medication Sig Dispense Refill     cholecalciferol, vitamin D3, (VITAMIN D3) 2,000 unit cap Take by mouth.       fluconazole (DIFLUCAN) 150 MG tablet Take 1 tablet (150 mg total) by mouth once a week. 2 tablet 0     fluticasone (FLONASE) 50 mcg/actuation nasal spray 1 spray into each nostril bedtime.       gabapentin (NEURONTIN) 300 MG capsule Take 1 capsule (300 mg total) by mouth 3 (three) times a day. 90 capsule 11     latanoprost (XALATAN) 0.005 % ophthalmic solution INSTILL ONE DROP INTO EACH EYE AT BEDTIME  2.5 mL 0     LORazepam (ATIVAN) 1 MG tablet TAKE ONE TABLET BY MOUTH AT BEDTIME 90 tablet 0     losartan-hydrochlorothiazide (HYZAAR) 100-25 mg per tablet TAKE ONE TABLET BY MOUTH ONE TIME DAILY  90 tablet 2     meloxicam (MOBIC) 15 MG tablet Take 1 tablet (15 mg total) by mouth daily. 30 tablet 11     multivitamin-minerals-lutein (CENTRUM SILVER) tablet Take 1 tablet by mouth daily.       omeprazole (PRILOSEC) 40 MG capsule Take 40 mg by mouth daily as needed.        triamcinolone (KENALOG) 0.1 % cream Apply to affected area twice daily as needed       No current facility-administered medications for this visit.        Allergies   Allergen Reactions     Aspirin (Tartrazine Only)      Codeine        ROS: complete review of symptoms otherwise negative except as noted below    S: Very pleasant patient of Dr. Roberson who is here for 2  main reasons.  She has lots of other chronic issues but we focused on her neck pain which has some features of occipital neuralgia.  Have been on some nonsteroidals and prednisone without a lot of benefit.  She also fell 2 or 3 weeks ago and since then she has been limping on her left hip.  And suffers on a daily basis from this discomfort.  The hip pain is worse when she walks on it she also bumped her arm and knee but it is the left hip which is the major issue.  Her neck pain is been gone on and off for the past several months left greater than right facet night.  It is a burning type of sensation sometimes she wonders if there is in the lump or lymph nodes there but she does not really feel anything but is tender to touch       O:   Vitals:    05/09/18 0919   BP: 122/70   Patient Site: Left Arm   Patient Position: Sitting   Cuff Size: Adult Large   Pulse: 77   SpO2: 96%       Physical Exam:  General-very pleasant woman who needs to help getting up onto the examining cart; she grimaces and moans and discomfort when she walks apparently because of her hip; massive lower truncal and related obesity  VS- see above  HEENT- neg   Neck-she is tender in the occipital area mostly on the left perhaps somewhat on the right; no palpable soft tissue masses or abnormalities  Chest- clear   Cor- reg no murmurs/gallops/ectopics            Justyn Dozier MD

## 2021-06-17 NOTE — TELEPHONE ENCOUNTER
RN cannot approve Refill Request    RN can NOT refill this medication Protocol failed and NO refill given. Last office visit: 11/19/2019 Js Roberson MD Last Physical: Visit date not found Last MTM visit: Visit date not found Last visit same specialty: 11/19/2019 Js Roberson MD.  Next visit within 3 mo: Visit date not found  Next physical within 3 mo: Visit date not found      Beth Ribera, [facility] Triage/Med Refill 4/27/2021    Requested Prescriptions   Pending Prescriptions Disp Refills     latanoprost (XALATAN) 0.005 % ophthalmic solution [Pharmacy Med Name: Latanoprost Ophthalmic Solution 0.005 %] 2.5 mL 0     Sig: INSTILL 1 DROP INTO EACH EYE AT BEDTIME       There is no refill protocol information for this order

## 2021-06-17 NOTE — TELEPHONE ENCOUNTER
Controlled Substance Refill Request  Medication Name:   Requested Prescriptions     Pending Prescriptions Disp Refills     LORazepam (ATIVAN) 1 MG tablet [Pharmacy Med Name: LORazepam Oral Tablet 1 MG] 90 tablet 0     Sig: TAKE ONE TABLET BY MOUTH AT BEDTIME     Date Last Fill: 6/22/2020  Is patient out of medication?: N/A - electronic request  Patient notified refills processed within 3 business days: N/A - electronic request  Requested Pharmacy: [facility]  Submit electronically to pharmacy  Controlled Substance Agreement on file:   Encounter-Level CSA Scan Date - 12/06/2018:    Scan on 12/11/2018  5:36 PM           Encounter-Level CSA Scan Date - 11/02/2016:    Scan on 11/11/2016  1:45 PM        Last office visit:  11/19/19      
Telephone Encounter by Chyna Zavala CMA at 10/5/2020  9:55 AM     Author: Chyna Zavala CMA Service: -- Author Type: Certified Medical Assistant    Filed: 10/5/2020 11:22 AM Encounter Date: 9/30/2020 Status: Signed    : Chyna Zavala CMA (Certified Medical Assistant)       Medication: Lorazepam   Last Date Filled 6/22/2020   pulled: YES    Only PCP Prescribing? : YES  Taken as prescribed from physician notes? YES    CSA in last year: NO  Random Utox in last year: NO  (if any of the above answer NO - schedule with PCP)     Opioids + benzodiazepines? NO  (if the above answer YES - schedule with PCP every 6 months)     Is patient on the Executive Review Team? NO      All responses suggest: Scheduling with PCP for further intervention             
Who is calling:  Patient grand daughter .  Reason for Call:  Caller is calling in for follow up on refill request for Lorazepam . Caller states patient   recently she is not able to sleep need refill As soon as possible  . She will schedule appointment today requesting refill until patient see provider .  Date of last appointment with primary care: Unknown   Okay to leave a detailed message: No      
Impaired
Impaired

## 2021-06-17 NOTE — TELEPHONE ENCOUNTER
Reason for Call:  Medication or medication refill:    latanoprost (XALATAN) 0.005 % ophthalmic solution    Do you use a [facility]?  Name of the pharmacy and phone number for the current request: [facility] [phone]    Name of the medication requested: latanoprost (XALATAN) 0.005 % ophthalmic solution    Other request: Can we add additional refills since patient uses them daily and the bottle is so small.     Can we leave a detailed message on this number? No call back needed    Phone number patient can be reached at: Home number on file [phone] (home)    Best Time:     Call taken on 4/27/2021 at 4:49 PM by Linda Umanzor

## 2021-06-17 NOTE — TELEPHONE ENCOUNTER
Telephone Encounter by Julisa Power MA at 12/28/2020 12:31 PM     Author: Julisa Power MA Service: -- Author Type: Medical Assistant    Filed: 12/28/2020 12:32 PM Encounter Date: 12/22/2020 Status: Signed    : Julisa Power MA (Medical Assistant)       Prescription Monitoring Program activity reviewed with no discrepancies noted.      Last fill per : 10/6/20  Quantity/days supply: 90 tablets for 90 days    Controlled Substance Agreement on file: No  Date: NA    Last office visit with provider:  10/7/20    Please advise.

## 2021-06-18 NOTE — PROGRESS NOTES
ASSESSMENT:  1. Essential hypertension with goal blood pressure less than 140/90  Stable.    2. Primary osteoarthritis, unspecified site  Diffuse arthralgias and myalgias.  Consider polymyalgia rheumatica.  Consider gouty arthropathy.  Consider fibromyalgia.  Documented osteoarthritis.  Discussed trial of allopurinol, naltrexone, topiramate, prednisone as outlined below.  She is agreeable to trying one medication  - Uric Acid    3. Neuropathy (H)  Check labs.  Rule out low-grade diabetes.  Encourage trial of naltrexone or topiramate  - C-Reactive Protein  - Glycosylated Hemoglobin A1c  - HM2(CBC w/o Differential)  - Vitamin D, Total (25-Hydroxy)  - Vitamin B12  - Thyroid Stimulating Hormone (TSH)  - Erythrocyte Sedimentation Rate    4.  Headache.  Exam clearly shows a bogginess of the occiput but no glands.  Suspect all of her symptoms are recurrent neuralgia    PLAN:  Patient Instructions   Naltrexone 50 mgs, one half pill at bedtime to help pain, and help sleep, and help decrease appetite, and help nerve pain.  If it does not work after 1 week, you could stop    topiramate 25 mgs twice daily to help numb nerves, and help pain, and help decrease appetite    You could see a rheumatologist, a specialist on arthritis    We can check blood tests for arthritis    All the xrays show Osteoarthritis, but you may have other types also    I think the pain in your head and arms is also nerve irritation pain    We could use low dose prednisone 10 mgs daily for one month for the arthritis pain and muscle pain    Take the meloxicam 15 mgs every day for arthritis and muscle pain    Add Allopurinol 300 mgs every day to lower uric acid, and help uric acid/gout type arthritis          Orders Placed This Encounter   Procedures     C-Reactive Protein     Glycosylated Hemoglobin A1c     HM2(CBC w/o Differential)     Vitamin D, Total (25-Hydroxy)     Vitamin B12     Uric Acid     Thyroid Stimulating Hormone (TSH)     Erythrocyte  Sedimentation Rate     Medications Discontinued During This Encounter   Medication Reason     LORazepam (ATIVAN) 1 MG tablet      gabapentin (NEURONTIN) 300 MG capsule      meloxicam (MOBIC) 15 MG tablet Reorder       Return in about 4 weeks (around 7/17/2018).    CHIEF COMPLAINT:  Chief Complaint   Patient presents with     Concerns     gland in back of neck of neck up to hairline swelling and is causing headaches      Referral     shoulder is still bothering pt        HISTORY OF PRESENT ILLNESS:  Loren is a 82 y.o. female presenting to the clinic today with complaints of swelling in the back of her neck and joint pain.     Joint Pain: She has pain in numerous joints, but it is the most significant in her right shoulder. She has had imaging done of her shoulder in the past and was told she has bone spurs. The shoulder hurts the most during the night, and she is not able to lie on that side. Of note, she has had past injury to that shoulder. She has tried taking different medications for this pain, including Aleve, but meloxicam has been the most helpful. She has never taken the meloxicam every day for a couple of months, but she has been taking it regularly for the past couple of weeks.  She has pain every day. Her shoulder feels better if she keeps it warm. Her right arm is numb all the way down to her fingers when she wakes up in the morning but this sensation resolves throughout the day. All of the fingers in her right hand feel numb. She also get some swelling in her hands from time to time. She has pain in her left shoulder as well, but it is not as bad as her right. There has been some injury to her right hand in the past. She has limited range of motion in her shoulder and cannot reach above her head. Her ankles do not hurt. She has pain in her knees, but she is s/p bilateral TKA's. She states there was a nerve injured during her left knee replacement surgery. She would be willing to try one medication for  "her joint pain, but she does not like taking a lot of pills.     Neck Swelling: She has a problem with \"a gland swelling on the back of her neck.\" She has had this problem a couple of times in the past. It can be on either side of her head, but it is affecting the left side of her neck this time. The lump seems to be associated with a headache and pressure behind her eye. She states it feels like there is fluid under the skin in the area she feels the lump. The puffiness has gone away with steroid treatment in the past. It hurts when she lies on the area that is swollen. She has not been taking gabapentin regularly because it did not seem to help anything. She was seen by Dr. Dozier on 5/9 for neck pain, and he recommended considering an injection, but she never followed through with this.     Hypertension: She continues to take losartan-HCTZ 100-25 mg daily. Her blood pressure is in a good range today.     Insomnia: She continues to take one lorazepam at bedtime. She is not sure she even needs this.     REVIEW OF SYSTEMS:   She suffered a fall earlier this year and injured her hip. She continues to take a few vitamins. She has to take omeprazole any time she takes a pain medication or else her stomach becomes irritated. She feels as though her energy level has been good for her age. She has a trigger finger in her left hand. She has never had gout. She does not have psoriasis or any skin problems. She has never been diagnosed with diabetes. All other systems are negative.    PFSH:  She has a family history of diabetes. She has a family history of osteoarthritis but not gout.     TOBACCO USE:  History   Smoking Status     Never Smoker   Smokeless Tobacco     Never Used       VITALS:  Vitals:    06/19/18 1135   BP: 124/74   Patient Site: Left Arm   Patient Position: Sitting   Pulse: 88   SpO2: 98%     Wt Readings from Last 3 Encounters:   06/05/17 222 lb (100.7 kg)   03/28/17 221 lb 0.2 oz (100.2 kg)   11/02/16 (!) " 223 lb (101.2 kg)     There is no height or weight on file to calculate BMI.    PHYSICAL EXAM:  Constitutional:  Reveals an alert, pleasant, talkative, elderly woman.  Vitals:  Per nursing notes.  Neck:  Supple, thyroid not palpable. Boggy over left occipital ridge, no lump or lymph adenopathy.   Cardiac:  Regular rate and rhythm without murmurs, rubs, or gallops. Palpation of the radial pulse regular. Legs without edema.  Rheumatologic: Notes pain at the base of the thumb and MCP joints of right hand, MCP joints of left hand.   Right Shoulder: slightly limited ROM, anterior pain with flexion and extension. No swelling. Slight crepitance.    Psychiatric:  Mood appropriate, memory intact.     MEDICATIONS:  Current Outpatient Prescriptions   Medication Sig Dispense Refill     cholecalciferol, vitamin D3, (VITAMIN D3) 2,000 unit cap Take by mouth.       fluconazole (DIFLUCAN) 150 MG tablet Take 1 tablet (150 mg total) by mouth once a week. 2 tablet 0     fluticasone (FLONASE) 50 mcg/actuation nasal spray 1 spray into each nostril bedtime.       latanoprost (XALATAN) 0.005 % ophthalmic solution INSTILL ONE DROP INTO EACH EYE AT BEDTIME 2.5 mL 0     LORazepam (ATIVAN) 1 MG tablet TAKE ONE TABLET BY MOUTH AT BEDTIME 90 tablet 0     losartan-hydrochlorothiazide (HYZAAR) 100-25 mg per tablet TAKE ONE TABLET BY MOUTH ONE TIME DAILY  90 tablet 2     meloxicam (MOBIC) 15 MG tablet Take 1 tablet (15 mg total) by mouth daily. 30 tablet 11     multivitamin-minerals-lutein (CENTRUM SILVER) tablet Take 1 tablet by mouth daily.       omeprazole (PRILOSEC) 40 MG capsule Take 40 mg by mouth daily as needed.        triamcinolone (KENALOG) 0.1 % cream Apply to affected area twice daily as needed       naltrexone (DEPADE) 50 mg tablet Take 0.5 tablets (25 mg total) by mouth at bedtime. 15 tablet 11     predniSONE (DELTASONE) 10 mg tablet Take 1 tablet (10 mg total) by mouth daily. 30 tablet 2     No current facility-administered  medications for this visit.        ADDITIONAL HISTORY SUMMARIZED (2): Reviewed 5/9/2018 Dr. Dozier note regarding neck and hip pain.   DECISION TO OBTAIN EXTRA INFORMATION (1): None.   RADIOLOGY TESTS (1): Reviewed 5/18/2018 MRI hip - moderate degenerative arthritis   LABS (1): Labs from 3/28/2017 reviewed. Labs ordered.   MEDICINE TESTS (1): None.  INDEPENDENT REVIEW (2 each): None.     The visit lasted a total of 35 minutes face to face with the patient. Over 50% of the time was spent counseling and educating the patient about her neck and joint pain.    IAllan, am scribing for and in the presence of, Dr. Roberson.    I, Dr. Roberson, personally performed the services described in this documentation, as scribed by Allan Hidalgo in my presence, and it is both accurate and complete.    Total data points: 4

## 2021-06-19 NOTE — LETTER
Letter by Js Roberson MD at      Author: Js Roberson MD Service: -- Author Type: --    Filed:  Encounter Date: 4/23/2019 Status: (Other)         Loren Marie  [address] [place] [address]             April 23, 2019         Dear Ms. Marie,    Below are the results from your recent visit:    Resulted Orders   Microalbumin, Random Urine   Result Value Ref Range    Microalbumin, Random Urine 0.76 0.00 - 1.99 mg/dL    Creatinine, Urine 54.1 mg/dL    Microalbumin/Creatinine Ratio Random Urine 14.0 <=19.9 mg/g    Narrative    Microalbumin, Random Urine  <2.0 mg/dL . . . . . . . . Normal  3.0-30.0 mg/dL . . . . . . Microalbuminuria  >30.0 mg/dL . . . . . .  . Clinical Proteinuria  Microalbumin/Creatinine Ratio, Random Urine  <20 mg/g . . . . .. . . . Normal   mg/g . . . . . . . Microalbuminuria  >300 mg/g . . . . . . . . Clinical Proteinuria   Drugs of Abuse 1,Urine   Result Value Ref Range    Amphetamines Screen Negative Screen Negative    Benzodiazepines Screen Negative Screen Negative    Opiates Screen Negative Screen Negative    Phencyclidine Screen Negative Screen Negative    THC Screen Negative Screen Negative    Barbiturates Screen Negative Screen Negative    Cocaine Metabolite Screen Negative Screen Negative    Oxycodone Screen Negative Screen Negative    Creatinine, Urine 54.1 mg/dL    Narrative    Drug                  Screening Threshold  Amphetamines           1000 ng/mL  Benzodiazepine          200 ng/mL  Opiates                 300 ng/mL  Phencyclidine            25 ng/mL  THC Metabolite           50 ng/mL  Barbiturates            200 ng/mL  Cocaine Metabolite      150 ng/mL  Oxycodone               100 ng/mL  Screening results are to be used only for medical purposes.  Unconfirmed screening results must not be used for non-  medical purposes.   Glycosylated Hemoglobin A1c   Result Value Ref Range    Hemoglobin A1c 6.1 (H) 3.5 - 6.0 %   HM1 (CBC with Diff)   Result Value  Ref Range    WBC 6.4 4.0 - 11.0 thou/uL    RBC 5.12 3.80 - 5.40 mill/uL    Hemoglobin 13.9 12.0 - 16.0 g/dL    Hematocrit 41.8 35.0 - 47.0 %    MCV 82 80 - 100 fL    MCH 27.2 27.0 - 34.0 pg    MCHC 33.3 32.0 - 36.0 g/dL    RDW 11.9 11.0 - 14.5 %    Platelets 362 140 - 440 thou/uL    MPV 7.7 7.0 - 10.0 fL    Neutrophils % 77 (H) 50 - 70 %    Lymphocytes % 18 (L) 20 - 40 %    Monocytes % 3 2 - 10 %    Eosinophils % 1 0 - 6 %    Basophils % 0 0 - 2 %    Neutrophils Absolute 4.9 2.0 - 7.7 thou/uL    Lymphocytes Absolute 1.2 0.8 - 4.4 thou/uL    Monocytes Absolute 0.2 0.0 - 0.9 thou/uL    Eosinophils Absolute 0.1 0.0 - 0.4 thou/uL    Basophils Absolute 0.0 0.0 - 0.2 thou/uL       Your diabetes is well controlled, and your other blood tests look good    Please call with questions or contact us using [employer].    Sincerely,        Electronically signed by Js Roberson MD

## 2021-06-19 NOTE — LETTER
Letter by Js Roberson MD at      Author: Js Roberson MD Service: -- Author Type: --    Filed:  Encounter Date: 11/22/2019 Status: Signed         Loren Marie  [address] [place] [address]             November 22, 2019         Dear Ms. Marie,    Below are the results from your recent visit:    Resulted Orders   Glycosylated Hemoglobin A1c   Result Value Ref Range    Hemoglobin A1c 6.2 (H) 3.5 - 6.0 %   Antinuclear Antibodies Screen (KIANA)   Result Value Ref Range    KIANA Screen Cascade 1.0 <=2.9 U    Narrative    <1.0 negative  1.1-2.9 weakly positive  3.0-5.9 positive ( reflex)  > or=6.0 strongly positive   Rheumatoid Factor Quant   Result Value Ref Range    Rheumatoid Factor Quantitative <15.0 0 - 30 IU/mL   CCP Antibodies   Result Value Ref Range    CCP IgG Antibodies <0.5 <=4.9 U/mL   Lyme Antibody Cascade   Result Value Ref Range    Lyme Antibody Cascade 0.05 <0.90 Index Value    Narrative    Interpretation of Lyme Disease Total Antibody (IgG/IgM)  <0.90 Test Value=Negative  No detectable antibodies to B. burgdorferi. Patients  in early stages of infection may not produce  detectable levels of antibody. Antibiotic therapy  in early disease may prevent antibody production  from reaching detectable levels. Patients with  clinical history and/or symptoms suggestive of Lyme  disease but with negative test results should be  retested in 2-4 weeks.  0.90-<1.10 Test Value=Borderline  Suggests the presence of antibodies to B.  burgdorferi. Recommend repeat collection in 2-4  weeks.  >=1.10 Test Value=Positive  Indicates the presence of antibodies to B.  burgdorferi. False positive results can occur with  sera from syphilis patients. Cross-reactivity may  occur with relapsing fever, [place] Spotted  fever, other spirochetal diseases, erythematosus,  EBV infection, or CMV infection. Clinical symptoms,  epidemiology of the case and other laboratory tests  should allow for distinction  of these conditions from  Lyme disease.   C-Reactive Protein   Result Value Ref Range    CRP 5.6 (H) 0.0 - 0.8 mg/dL   Erythrocyte Sedimentation Rate   Result Value Ref Range    Sed Rate 20 0 - 20 mm/hr       Although your CRP test of inflammation is high, the specific tests of inflammatory arthritis are all normal.  A rheumatology consult might help us sort this out    Please call with questions or contact us using [employer].    Sincerely,        Electronically signed by Js Roberson MD

## 2021-06-20 NOTE — LETTER
Letter by Brett Uribe DO at      Author: Brett Uribe DO Service: -- Author Type: --    Filed:  Encounter Date: 3/23/2020 Status: (Other)         Loren Marie  [address] [place] [address]             March 23, 2020         Dear Ms. Marie,    Below are the results from your recent visit:    Resulted Orders   HM1 (CBC with Diff)   Result Value Ref Range    WBC 5.0 4.0 - 11.0 thou/uL    RBC 4.89 3.80 - 5.40 mill/uL    Hemoglobin 12.9 12.0 - 16.0 g/dL    Hematocrit 40.0 35.0 - 47.0 %    MCV 82 80 - 100 fL    MCH 26.4 (L) 27.0 - 34.0 pg    MCHC 32.2 32.0 - 36.0 g/dL    RDW 12.6 11.0 - 14.5 %    Platelets 262 140 - 440 thou/uL    MPV 7.7 7.0 - 10.0 fL    Neutrophils % 50 50 - 70 %    Lymphocytes % 39 20 - 40 %    Monocytes % 8 2 - 10 %    Eosinophils % 2 0 - 6 %    Basophils % 1 0 - 2 %    Neutrophils Absolute 2.5 2.0 - 7.7 thou/uL    Lymphocytes Absolute 2.0 0.8 - 4.4 thou/uL    Monocytes Absolute 0.4 0.0 - 0.9 thou/uL    Eosinophils Absolute 0.1 0.0 - 0.4 thou/uL    Basophils Absolute 0.0 0.0 - 0.2 thou/uL      Some improvement of white blood cell count noted, latest level within normal range.  Remainder of CBC/cell  count results were stable/unremarkable.      Please call with questions or contact us using [employer].    Sincerely,        Electronically signed by Brett Uribe DO

## 2021-06-20 NOTE — LETTER
Letter by Brett Uribe DO at      Author: Brett Uribe DO Service: -- Author Type: --    Filed:  Encounter Date: 1/28/2020 Status: (Other)         Loren Marie  [address] [place] [address]             January 28, 2020         Dear Ms. Marie,    Below are the results from your recent visit:    Resulted Orders   XR Shoulder Right 2 or More VWS    Narrative    EXAM: XR SHOULDER RIGHT 2 OR MORE VWS  LOCATION: [facility]  DATE/TIME: 1/21/2020 10:59 AM    INDICATION: Pain in right shoulder  COMPARISON: None.      Impression    No fracture or dislocation. Moderate degenerative changes at the glenohumeral and acromioclavicular joints.       IMPRESSION:    No fracture or dislocation. Moderate degenerative changes at the glenohumeral and acromioclavicular  joints.      Degenerative findings mentioned are consistent with having signs of Osteoarthritis. Osteoarthritis  represents gradual wearing down with time of the protective cartilage in a joint.     Please call with questions or contact us using [employer].    Sincerely,        Electronically signed by Brett Uribe DO

## 2021-06-20 NOTE — LETTER
Letter by Brett Uribe DO at      Author: Brett Uribe DO Service: -- Author Type: --    Filed:  Encounter Date: 1/28/2020 Status: (Other)         Loren Marie  [address] [place] [address]             January 28, 2020         Dear Ms. Marie,    Below are the results from your recent visit:    Resulted Orders   XR Shoulder Left 2 or More VWS    Narrative    EXAM: XR SHOULDER LEFT 2 OR MORE VWS  LOCATION: [facility]  DATE/TIME: 1/21/2020 10:59 AM    INDICATION: Left shoulder pain.  COMPARISON: None.      Impression    No fracture or dislocation. Advanced degenerative changes at the glenohumeral joint. Moderate degenerative changes at the AC joint.       IMPRESSION:    No fracture or dislocation. Advanced degenerative changes at the glenohumeral joint. Moderate  degenerative changes at the AC joint.      Degenerative findings mentioned are consistent with having signs of Osteoarthritis. Osteoarthritis  represents gradual wearing down with time of the protective cartilage in a joint.    Please call with questions or contact us using [employer].    Sincerely,        Electronically signed by Brett Uribe DO

## 2021-06-20 NOTE — LETTER
Letter by Brett Uribe DO at      Author: Brett Uribe DO Service: -- Author Type: --    Filed:  Encounter Date: 1/28/2020 Status: (Other)         Loren Marie  [address] [place] [address]             January 28, 2020         Dear Ms. Marie,    Below are the results from your recent visit:    Resulted Orders   XR Hand Right 3 or More VWS    Narrative    EXAM: XR HAND RIGHT 3 OR MORE VWS  LOCATION: [facility]  DATE/TIME: 1/21/2020 10:59 AM    INDICATION: Pain in right hand.  COMPARISON: None.      Impression    Advanced degenerative changes at the STT and first CMC joints. Moderate degenerative changes at the MCP joint of the index finger and DIP joints of the index and middle fingers. No fracture or dislocation.              Degenerative findings mentioned are consistent with having signs of Osteoarthritis. Osteoarthritis  represents gradual wearing down with time of the protective cartilage in a joint.     Please call with questions or contact us using [employer].    Sincerely,        Electronically signed by Brett Uribe DO

## 2021-06-22 NOTE — ANESTHESIA POSTPROCEDURE EVALUATION
Patient: Loren Marie  CHOLECYSTECTOMY, LAPAROSCOPIC- with cholangiograms  Anesthesia type: No value filed.    Patient location: PACU  Last vitals:   Vitals:    12/01/18 1653   BP: 176/76   Pulse: 80   Resp: 20   Temp:    SpO2: 100%     Post vital signs: stable  Level of consciousness: awake and responds to simple questions  Post-anesthesia pain: pain controlled  Post-anesthesia nausea and vomiting: no  Pulmonary: unassisted, return to baseline  Cardiovascular: stable and blood pressure at baseline  Hydration: adequate  Anesthetic events: no    QCDR Measures:  ASA# 11 - Jesenia-op Cardiac Arrest: ASA11B - Patient did NOT experience unanticipated cardiac arrest  ASA# 12 - Jesenia-op Mortality Rate: ASA12B - Patient did NOT die  ASA# 13 - PACU Re-Intubation Rate: ASA13B - Patient did NOT require a new airway mgmt  ASA# 10 - Composite Anes Safety: ASA10A - No serious adverse event    Additional Notes:

## 2021-06-22 NOTE — PROGRESS NOTES
ASSESSMENT:  1. Essential hypertension with goal blood pressure less than 140/90  Stable back on normal meds    2. Gastroesophageal reflux disease with esophagitis  Stable on pantoprazole.  Monitor bone density  - DXA Bone Density Scan; Future    3. Hypokalemia  Low in the hospital with nausea and vomiting.  Recheck    4. Morbid obesity (H)  Encourage attempts at weight loss    5. Type 2 diabetes mellitus without complication, without long-term current use of insulin (H)  New diagnosis last summer.  Inadvertently on steroids the last few months.  Recheck and decrease steroids  - Glycosylated Hemoglobin A1c    6. Calculus of gallbladder with acute cholecystitis and obstruction  Reviewed acute cholecystitis and cholecystectomy.  Pain improving.  No evidence of infection    7. S/P laparoscopic cholecystectomy  Recheck labs.  Reviewed operative and pathology report  - Basic Metabolic Panel  - HM2(CBC w/o Differential)    8. Primary osteoarthritis, unspecified site  Marked improvement with addition of meloxicam, prednisone, and naltrexone.  Now off narcotics.  Resume naltrexone.  Decrease prednisone.  Continue meloxicam  - predniSONE (DELTASONE) 10 mg tablet; Take 5 mg by mouth daily.  Dispense: 45 tablet; Refill: 3    9. Long term current use of systemic steroids   Recommend bone density  - DXA Bone Density Scan; Future        PLAN:  Patient Instructions   Resume Naltrexone pill, one half pill at bedtime    Decrease Prednisone to 5 mgs daily in the morning    Other meds are same    Update blood tests    Depending on how this plays out, we could increase the Naltrexone to a full pill       Orders Placed This Encounter   Procedures     DXA Bone Density Scan     Standing Status:   Future     Standing Expiration Date:   12/6/2019     Order Specific Question:   Can the procedure be changed per Radiologist protocol?     Answer:   Yes     Glycosylated Hemoglobin A1c     Basic Metabolic Panel     HM2(CBC w/o Differential)      Medications Discontinued During This Encounter   Medication Reason     oxyCODONE-acetaminophen (PERCOCET/ENDOCET) 5-325 mg per tablet Therapy completed     predniSONE (DELTASONE) 10 mg tablet        Return in about 2 months (around 2/6/2019) for for a full review of medications and lab testing.    All patient medications were reconciled.     CHIEF COMPLAINT:  No chief complaint on file.      HISTORY OF PRESENT ILLNESS:  Loren is a 83 y.o. female presenting to the clinic today for outpatient follow up after in patient stay at [facility] for acute cholelithiasis discharged on 12/3/18.     Cholelithiasis: Since discharge from the hospital on Monday she has been doing well except she still feels soreness in her abdomen where her cholecystectomy was performed. It feels like steady pain levels, not really getting better. She does not take her stronger pain killers because they don't sit well with her stomach. The ones she has she tried taking just a half but isn't even doing that now. She is taking tylenol. Appetite is improving. Bowel movements started back up today. She hadn't been eating much right after the surgery but she has started to now.     Arthritis/neuropathy/headaches: Over thee past summer she was taking naltrexone, prednisone and meloxicam and does not know which was most helpful. The arthritis and neuropathy were both better and she was quite happy with how she felt. She was tolerating them well. She was sleeping well enough. She was not taking gabapentin. She continues to have mild neuropathy in left foot now but it is not bad and she is not interested in surgical options.    DMII: 6/19/18 A1C 6.6. She has mild DMII that showed up in lab work in June. She has lost a few pounds, and has been trying to treat this with diet. She has other sleep medicine she can take prn, but does not need to.     Health Maintenance: DXA due-patient is not eager for one and has never had a broken bone.      REVIEW OF SYSTEMS:   Denies GERD symptoms, high bp, mood problems, drainage or bleeding from surgical site, problems passing gas,   All other systems are negative.    PFSH:  Her  was recently in [facility] and just got back home as well. She has been  to him for 63 years, and he is a good man that she would not trade for anyone. She notes he is a good father.   She continues to be physically active.   Reviewed as below.     TOBACCO USE:  Social History     Tobacco Use   Smoking Status Never Smoker   Smokeless Tobacco Never Used       VITALS:  Vitals:    12/06/18 1122   BP: 130/82   Patient Site: Left Arm   Patient Position: Sitting   Cuff Size: Adult Regular   Pulse: 76   Resp: 14   Weight: 215 lb (97.5 kg)     Wt Readings from Last 3 Encounters:   12/06/18 215 lb (97.5 kg)   12/03/18 219 lb 8 oz (99.6 kg)   06/05/17 222 lb (100.7 kg)     Body mass index is 40.62 kg/m .    PHYSICAL EXAM:   Constitutional:  Reveals an alert oriented pleasant talkative elderly woman in no acute distress, affect appropriate.  Vitals:  Per nursing notes.  Cardiac:  Regular rate and rhythm without murmurs, rubs, or gallops.   Abdomen: Obese abdomen, RUQ pain to palpation and well healing surgical incision , Bowel sounds positive.    Neurologic: Cranial nerves II-XII, motor strength, sensation, and coordination grossly intact.     Psychiatric:  Mood appropriate, memory intact.     QUALITY MEASURES:  The following high BMI interventions were performed this visit: encouragement to exercise and weight monitoring    MEDICATIONS:  Current Outpatient Medications   Medication Sig Dispense Refill     latanoprost (XALATAN) 0.005 % ophthalmic solution INSTILL ONE DROP INTO EACH EYE AT BEDTIME 2.5 mL 12     LORazepam (ATIVAN) 1 MG tablet TAKE ONE TABLET BY MOUTH AT BEDTIME 90 tablet 0     losartan-hydrochlorothiazide (HYZAAR) 100-25 mg per tablet TAKE ONE TABLET BY MOUTH ONE TIME DAILY  90 tablet 1     meloxicam (MOBIC) 15 MG  tablet Take 1 tablet (15 mg total) by mouth daily. 30 tablet 11     multivitamin-minerals-lutein (CENTRUM SILVER) tablet Take 1 tablet by mouth daily.       naltrexone (DEPADE) 50 mg tablet Take 0.5 tablets (25 mg total) by mouth at bedtime as needed. 15 tablet 11     pantoprazole (PROTONIX) 40 MG tablet Take 1 tablet (40 mg total) by mouth daily before breakfast. 20 tablet 0     predniSONE (DELTASONE) 10 mg tablet Take 5 mg by mouth daily. 45 tablet 3     ranitidine (ZANTAC) 150 MG tablet Take 150 mg by mouth daily.       zolpidem (AMBIEN) 10 mg tablet TAKE 1 TABLET (10 MG) BY ORAL ROUTE ONCE DAILY AT BEDTIME AS NEEDED FOR SLEEP 30 tablet 4     No current facility-administered medications for this visit.        ADDITIONAL HISTORY SUMMARIZED (2): Hospital notes 12/1-12/3 reviewed for hospital course. Reviewed 12/1/18 laparascopic cholecystectomy surgical note showing one stone.   DECISION TO OBTAIN EXTRA INFORMATION (1): None.   RADIOLOGY TESTS (1): DXA ordered today.   LABS (1): 12/1/18, 12/2/18, 12/3/18 labs reviewed and ordered labs today.   MEDICINE TESTS (1): None.  INDEPENDENT REVIEW (2 each): None.     The visit lasted a total of 20 minutes face to face with the patient. Over 50% of the time was spent counseling and educating the patient about cholelithiasis, Arthritis, DMII.    I, Lacho Machuca, am scribing for and in the presence of, Dr. Roberson.    I, Dr. Roberson, personally performed the services described in this documentation, as scribed by Lacho Machuca in my presence, and it is both accurate and complete.    Total data points: 4

## 2021-06-22 NOTE — ANESTHESIA CARE TRANSFER NOTE
Last vitals:   Vitals:    12/01/18 1614   BP: (!) 183/78   Pulse: 80   Resp: 16   Temp: 36.7  C (98  F)   SpO2: 100%     Patient's level of consciousness is drowsy  Spontaneous respirations: yes  Maintains airway independently: yes  Dentition unchanged: yes  Oropharynx: oropharynx clear of all foreign objects    QCDR Measures:  ASA# 20 - Surgical Safety Checklist: WHO surgical safety checklist completed prior to induction    PQRS# 430 - Adult PONV Prevention: 4558F - Pt received => 2 anti-emetic agents (different classes) preop & intraop  ASA# 8 - Peds PONV Prevention: NA - Not pediatric patient, not GA or 2 or more risk factors NOT present  PQRS# 424 - Jesenia-op Temp Management: 4559F - At least one body temp DOCUMENTED => 35.5C or 95.9F within required timeframe  PQRS# 426 - PACU Transfer Protocol: - Transfer of care checklist used  ASA# 14 - Acute Post-op Pain: ASA14B - Patient did NOT experience pain >= 7 out of 10

## 2021-06-22 NOTE — ANESTHESIA PREPROCEDURE EVALUATION
Anesthesia Evaluation      Patient summary reviewed     Airway   Mallampati: II   Pulmonary                           Cardiovascular - normal exam  (+) hypertension, , hypercholesterolemia,      Neuro/Psych    (+) depression,     Endo/Other       GI/Hepatic/Renal    (+) GERD,             Dental                         Anesthesia Plan  Planned anesthetic: general endotracheal    ASA 2 - emergent   Induction: intravenous   Anesthetic plan and risks discussed with: patient  Anesthesia plan special considerations: antiemetics,   Post-op plan: routine recovery

## 2021-06-22 NOTE — PROGRESS NOTES
"TCM DISCHARGE FOLLOW UP CALL    Discharge Date:  12/2/2018  Reason for hospital stay (discharge diagnosis)::  Lap brock  Are you feeling better, the same or worse since your discharge?:  Patient is feeling the same (RUQ incision is painful. Is having nausea, especially after percocet. Poor intake of food and fluids.No flatus, no BM, no urge to defecate. Abd is distended, per pt. Voiding \"a lot\".)  Do you feel like you have a plan in the event of a health emergency?: Yes (would call the clinic)    As part of your discharge plan, were  home care services ordered for you?: No    Did you receive any new medications, or was there a change to your medications?: Yes    Are you taking those medications, or do you have any established regiment?:  Pt states she dislikes taking pills. Pt didn't take prednisone and didn't intend to. Instructed pt to take prednisone until her appt on 12/6 and she can discuss it with Dr Roberson. Pt agreed. She isn't taking naltrexone or Ativan. She is cutting Percocet in half because of nausea. Suggested she try Tylenol XS two tabs 3-4 times/day. Cautioned pt not to take percocet if she is taking the higher dose of Tylenol. Pt verbalized understanding. She has started Protonix. Instructed pt to not to take ranitidine and Protonix at the same time but to space them out.  Do you have any follow up visits scheduled with your PCP or Specialist?:  Yes, with PCP (12/6. Surgery said she doesn't need to f/u with them)  (RN) Is PCP appt scheduled soon enough (within 14 days of discharge date)?: Yes    RN NOTES::  Percocet causes nausea and headache. using ice to abdomen, which helps.h    "

## 2021-06-23 NOTE — TELEPHONE ENCOUNTER
Medication: Lorazepam 1mg  Last Date Filled 9/7/18   pulled: YES    Only PCP Prescribing? : NO  Taken as prescribed from physician notes? YES    CSA in last year: YES  Random Utox in last year: NO  (if any of the above answer NO - schedule with PCP)     Opioids + benzodiazepines? YES  (if the above answer YES - schedule with PCP every 6 months)     All responses suggest: Refilling prescription

## 2021-06-23 NOTE — TELEPHONE ENCOUNTER
Controlled Substance Refill Request  Medication:   Requested Prescriptions     Pending Prescriptions Disp Refills     LORazepam (ATIVAN) 1 MG tablet [Pharmacy Med Name: LORazepam Oral Tablet 1 MG] 90 tablet 0     Sig: TAKE ONE TABLET BY MOUTH AT BEDTIME     Date Last Fill: 2/22/18  [facility]:  [facility]  Submit electronically to pharmacy  Controlled Substance Agreement on File:   Encounter-Level CSA Scan Date - 11/02/2016:    Scan on 11/11/2016  1:45 PM (below)         Last office visit: Last office visit pertaining to requested medication was 12/6/18.

## 2021-06-29 ENCOUNTER — COMMUNICATION - HEALTHEAST (OUTPATIENT)
Dept: INTERNAL MEDICINE | Facility: CLINIC | Age: 86
End: 2021-06-29

## 2021-06-29 DIAGNOSIS — G47.00 INSOMNIA: ICD-10-CM

## 2021-06-30 RX ORDER — LORAZEPAM 1 MG/1
TABLET ORAL
Qty: 90 TABLET | Refills: 0 | Status: SHIPPED | OUTPATIENT
Start: 2021-06-30 | End: 2021-09-16

## 2021-07-03 NOTE — ADDENDUM NOTE
Addendum Note by Munir Roberson MD at 10/24/2019  1:09 PM     Author: Munir Roberson MD Service: -- Author Type: Physician    Filed: 10/24/2019  1:09 PM Encounter Date: 10/24/2019 Status: Signed    : Munir Roberson MD (Physician)    Addended by: MUNIR ROBERSON on: 10/24/2019 01:09 PM        Modules accepted: Orders

## 2021-07-04 NOTE — TELEPHONE ENCOUNTER
Telephone Encounter by Cathryn Rollins LPN at 6/30/2021 10:10 AM     Author: Cathryn Rollins LPN Service: -- Author Type: Licensed Nurse    Filed: 6/30/2021 10:13 AM Encounter Date: 6/29/2021 Status: Signed    : Cathryn Rollins LPN (Licensed Nurse)       Medication: Lorazepam 1 mg  Last Date Filled 3/19/21, 90 tablets for 90 days   pulled: YES         Only PCP Prescribing? : YES  Taken as prescribed from physician notes? YES    CSA in last year: NO  Random Utox in last year: NO  Opioids + benzodiazepines? YES     Patient's use of lorazepam for insomnia was last discussed with Dr. Roberson on 1/11/21.    Is patient on the Executive Review Team? NO      All responses suggest: Refilling prescription

## 2021-07-04 NOTE — TELEPHONE ENCOUNTER
Telephone Encounter by Lizzy Sanderson RN at 6/29/2021  4:10 PM     Author: Lizzy Sanderson RN Service: -- Author Type: Registered Nurse    Filed: 6/29/2021  4:10 PM Encounter Date: 6/29/2021 Status: Signed    : Lizzy Sanderson RN (Registered Nurse)       RN cannot approve Refill Request    RN can NOT refill this medication med is not covered by policy/route to provider. Last office visit: 11/19/2019 Js Roberson MD Last Physical: Visit date not found Last MTM visit: Visit date not found Last visit same specialty: 11/19/2019 Js Roberson MD.  Next visit within 3 mo: Visit date not found  Next physical within 3 mo: Visit date not found      Chayito Malin Triage/Med Refill 6/29/2021    Requested Prescriptions   Pending Prescriptions Disp Refills   ? LORazepam (ATIVAN) 1 MG tablet [Pharmacy Med Name: LORazepam Oral Tablet 1 MG] 90 tablet 0     Sig: TAKE ONE TABLET BY MOUTH DAILY AT BEDTIME.       Controlled Substances Refill Protocol Failed - 6/29/2021  4:01 PM        Failed - Route all Controlled Substance Requests to Provider        Failed - Patient has controlled substance agreement in past 12 months     Encounter-Level CSA Scan Date - 12/06/2018:    Scan on 12/11/2018  5:36 PM           Encounter-Level CSA Scan Date - 11/02/2016:    Scan on 11/11/2016  1:45 PM               Passed - Visit with PCP or prescribing provider visit in past 12 months      Last office visit with prescriber/PCP: 11/19/2019 Js Roberson MD OR same dept: Visit date not found OR same specialty: 11/19/2019 Js Roberson MD Last physical: Visit date not found Last MTM visit: Visit date not found    Next visit within 3 mo: Visit date not found  Next physical within 3 mo: Visit date not found  Prescriber OR PCP: Js Roberson MD  Last diagnosis associated with med order: 1. Insomnia  - LORazepam (ATIVAN) 1 MG tablet [Pharmacy Med Name: LORazepam Oral Tablet 1 MG]; TAKE ONE TABLET BY  MOUTH DAILY AT BEDTIME.  Dispense: 90 tablet; Refill: 0

## 2021-07-21 ENCOUNTER — RECORDS - HEALTHEAST (OUTPATIENT)
Dept: ADMINISTRATIVE | Facility: CLINIC | Age: 86
End: 2021-07-21

## 2021-08-18 DIAGNOSIS — F51.01 PRIMARY INSOMNIA: ICD-10-CM

## 2021-08-18 DIAGNOSIS — G47.00 INSOMNIA: ICD-10-CM

## 2021-08-18 DIAGNOSIS — F41.9 ANXIETY: Primary | ICD-10-CM

## 2021-08-18 NOTE — TELEPHONE ENCOUNTER
Pending Prescriptions:                       Disp   Refills    LORazepam (ATIVAN) 1 MG tablet            90 tab*0            Sig: TAKE ONE TABLET BY MOUTH DAILY AT BEDTIME.    zolpidem (AMBIEN) 5 MG tablet             90 tab*0            Sig: Take 1 tablet (5 mg) by mouth At Bedtime      Medication: Lorazepam  And Zolpidem  Last Date Filled 7/1/21 4/27/21    pulled: YES        Only PCP Prescribing? : YES  Taken as prescribed from physician notes? NO    CSA in last year: NO  Random Utox in last year: NO  (if any of the above answer NO - schedule with PCP)     Opioids + benzodiazepines? NO  (if the above answer YES - schedule with PCP every 6 months)     Is patient on the Executive Review Team? No      All responses suggest: PCP to review and advise as Lorazepam was just filled 7/1/21

## 2021-08-19 RX ORDER — LORAZEPAM 1 MG/1
TABLET ORAL
Qty: 90 TABLET | Refills: 0 | Status: SHIPPED | OUTPATIENT
Start: 2021-08-19 | End: 2022-04-07

## 2021-08-19 RX ORDER — ZOLPIDEM TARTRATE 5 MG/1
5 TABLET ORAL AT BEDTIME
Qty: 90 TABLET | Refills: 0 | Status: SHIPPED | OUTPATIENT
Start: 2021-08-19 | End: 2021-09-16

## 2021-09-09 DIAGNOSIS — K21.00 GASTROESOPHAGEAL REFLUX DISEASE WITH ESOPHAGITIS: ICD-10-CM

## 2021-09-09 DIAGNOSIS — K21.00 GASTROESOPHAGEAL REFLUX DISEASE WITH ESOPHAGITIS WITHOUT HEMORRHAGE: Primary | ICD-10-CM

## 2021-09-10 RX ORDER — FAMOTIDINE 20 MG/1
TABLET, FILM COATED ORAL
Qty: 180 TABLET | Refills: 1 | Status: SHIPPED | OUTPATIENT
Start: 2021-09-10 | End: 2021-11-19

## 2021-09-10 NOTE — TELEPHONE ENCOUNTER
"Routing refill request to provider for review/approval because:  A break in medication    Last Written Prescription Date:  7/2/20  Last Fill Quantity: 180,  # refills: 1   Last office visit provider:  1/11/21     Requested Prescriptions   Pending Prescriptions Disp Refills     famotidine (PEPCID) 20 MG tablet 180 tablet 1       H2 Blockers Protocol Passed - 9/9/2021  3:55 PM        Passed - Patient is age 12 or older        Passed - Recent (12 mo) or future (30 days) visit within the authorizing provider's specialty     Patient has had an office visit with the authorizing provider or a provider within the authorizing providers department within the previous 12 mos or has a future within next 30 days. See \"Patient Info\" tab in inbasket, or \"Choose Columns\" in Meds & Orders section of the refill encounter.              Passed - Medication is active on med list             Benitez Rogers RN 09/10/21 11:06 AM  "

## 2021-09-16 DIAGNOSIS — F51.01 PRIMARY INSOMNIA: ICD-10-CM

## 2021-09-16 DIAGNOSIS — G47.00 INSOMNIA: ICD-10-CM

## 2021-09-16 DIAGNOSIS — F41.9 ANXIETY: Primary | ICD-10-CM

## 2021-09-16 RX ORDER — ZOLPIDEM TARTRATE 5 MG/1
5 TABLET ORAL AT BEDTIME
Qty: 90 TABLET | Refills: 0 | Status: SHIPPED | OUTPATIENT
Start: 2021-09-16 | End: 2022-02-04

## 2021-09-16 RX ORDER — LORAZEPAM 1 MG/1
TABLET ORAL
Qty: 90 TABLET | Refills: 0 | Status: SHIPPED | OUTPATIENT
Start: 2021-09-16 | End: 2021-11-19

## 2021-09-16 NOTE — TELEPHONE ENCOUNTER
Pending Prescriptions:                       Disp   Refills    zolpidem (AMBIEN) 5 MG tablet             90 tab*0            Sig: Take 1 tablet (5 mg) by mouth At Bedtime    LORazepam (ATIVAN) 1 MG tablet            90 tab*0            Sig: TAKE ONE TABLET BY MOUTH DAILY AT BEDTIME.    Medication: Zolpidem and Lorazepam  Last Date Filled 8/20/21 6/30/21    pulled: YES        Only PCP Prescribing? : YES  Taken as prescribed from physician notes? YES    CSA in last year: NO  Random Utox in last year: NO  (if any of the above answer NO - schedule with PCP)     Opioids + benzodiazepines? NO  (if the above answer YES - schedule with PCP every 6 months)     Is patient on the Executive Review Team? No      All responses suggest: PCP to review and advise

## 2021-10-04 ENCOUNTER — NURSE TRIAGE (OUTPATIENT)
Dept: NURSING | Facility: CLINIC | Age: 86
End: 2021-10-04

## 2021-10-04 NOTE — TELEPHONE ENCOUNTER
Grand daughter calling. Is reporting that patient has   Red left eye, and she thinks she has conjunctivitis.    She was advised per protocol, that she should be seen today per symptoms.  Call was sent to Scheduling team.    Whitney Tyler, RN RN  [facility] Triage/refill nurse      Reason for Disposition    Patient wants to be seen    Red eyes present > 7 days    Additional Information    Negative: Chemical got in the eye    Negative: Piece of something got in the eye    Negative: Followed an eye injury    Negative: Yellow or green pus in the eyes    Negative: Severe eye pain    Negative: Patient sounds very sick or weak to the triager    Negative: Blurred vision    Negative: Cloudy spot or sore seen on the cornea (clear part of the eye)    Negative: Eyelids are very swollen (shut or almost)    Negative: Eyelid (outer) is very red    Negative: Vomiting    Negative: Foreign body sensation ('feels like something is in there')    Negative: Recent eye surgery and has increasing eye pain    Negative: Eye pain/discomfort that is more than mild    Negative: Eye pain present > 24 hours    Negative: Bleeding on white of the eye and is taking Coumadin or known bleeding disorder (e.g., thrombocytopenia)    Negative: Only 1 eye is red, and persists > 48 hours    Protocols used: EYE - RED WITHOUT PUS-A-OH

## 2021-11-19 ENCOUNTER — OFFICE VISIT (OUTPATIENT)
Dept: INTERNAL MEDICINE | Facility: CLINIC | Age: 86
End: 2021-11-19
Payer: COMMERCIAL

## 2021-11-19 VITALS
RESPIRATION RATE: 18 BRPM | HEIGHT: 61 IN | HEART RATE: 82 BPM | SYSTOLIC BLOOD PRESSURE: 158 MMHG | DIASTOLIC BLOOD PRESSURE: 74 MMHG | BODY MASS INDEX: 40.61 KG/M2 | OXYGEN SATURATION: 97 % | WEIGHT: 215.1 LBS

## 2021-11-19 DIAGNOSIS — K21.00 GASTROESOPHAGEAL REFLUX DISEASE WITH ESOPHAGITIS WITHOUT HEMORRHAGE: ICD-10-CM

## 2021-11-19 DIAGNOSIS — E55.9 VITAMIN D DEFICIENCY: ICD-10-CM

## 2021-11-19 DIAGNOSIS — Z00.00 MEDICARE ANNUAL WELLNESS VISIT, SUBSEQUENT: Primary | ICD-10-CM

## 2021-11-19 DIAGNOSIS — E11.40 TYPE 2 DIABETES MELLITUS WITH DIABETIC NEUROPATHY, WITHOUT LONG-TERM CURRENT USE OF INSULIN (H): ICD-10-CM

## 2021-11-19 DIAGNOSIS — G62.9 NEUROPATHY: ICD-10-CM

## 2021-11-19 DIAGNOSIS — Z23 HIGH PRIORITY FOR 2019-NCOV VACCINE: ICD-10-CM

## 2021-11-19 DIAGNOSIS — K90.9 DIARRHEA DUE TO MALABSORPTION: ICD-10-CM

## 2021-11-19 DIAGNOSIS — I10 ESSENTIAL HYPERTENSION: ICD-10-CM

## 2021-11-19 DIAGNOSIS — R19.7 DIARRHEA DUE TO MALABSORPTION: ICD-10-CM

## 2021-11-19 DIAGNOSIS — Z13.220 SCREENING FOR HYPERLIPIDEMIA: ICD-10-CM

## 2021-11-19 DIAGNOSIS — M15.0 PRIMARY OSTEOARTHRITIS INVOLVING MULTIPLE JOINTS: ICD-10-CM

## 2021-11-19 LAB
ALBUMIN SERPL-MCNC: 4.1 G/DL (ref 3.5–5)
ALP SERPL-CCNC: 82 U/L (ref 45–120)
ALT SERPL W P-5'-P-CCNC: 21 U/L (ref 0–45)
ANION GAP SERPL CALCULATED.3IONS-SCNC: 13 MMOL/L (ref 5–18)
AST SERPL W P-5'-P-CCNC: 32 U/L (ref 0–40)
BILIRUB SERPL-MCNC: 0.3 MG/DL (ref 0–1)
BUN SERPL-MCNC: 12 MG/DL (ref 8–28)
CALCIUM SERPL-MCNC: 9.6 MG/DL (ref 8.5–10.5)
CHLORIDE BLD-SCNC: 100 MMOL/L (ref 98–107)
CHOLEST SERPL-MCNC: 256 MG/DL
CO2 SERPL-SCNC: 26 MMOL/L (ref 22–31)
CREAT SERPL-MCNC: 0.79 MG/DL (ref 0.6–1.1)
CREAT UR-MCNC: 29 MG/DL
FASTING STATUS PATIENT QL REPORTED: NO
GFR SERPL CREATININE-BSD FRML MDRD: 68 ML/MIN/1.73M2
GLUCOSE BLD-MCNC: 102 MG/DL (ref 70–125)
HBA1C MFR BLD: 6.4 % (ref 0–5.6)
HDLC SERPL-MCNC: 62 MG/DL
LDLC SERPL CALC-MCNC: 151 MG/DL
MICROALBUMIN UR-MCNC: <0.5 MG/DL (ref 0–1.99)
MICROALBUMIN/CREAT UR: NORMAL MG/G{CREAT}
POTASSIUM BLD-SCNC: 4.1 MMOL/L (ref 3.5–5)
PROT SERPL-MCNC: 7.4 G/DL (ref 6–8)
SODIUM SERPL-SCNC: 139 MMOL/L (ref 136–145)
TRIGL SERPL-MCNC: 215 MG/DL
VIT B12 SERPL-MCNC: >2000 PG/ML (ref 213–816)

## 2021-11-19 PROCEDURE — 0064A COVID-19,PF,MODERNA (18+ YRS BOOSTER .25ML): CPT | Performed by: INTERNAL MEDICINE

## 2021-11-19 PROCEDURE — 82607 VITAMIN B-12: CPT | Performed by: INTERNAL MEDICINE

## 2021-11-19 PROCEDURE — 83036 HEMOGLOBIN GLYCOSYLATED A1C: CPT | Performed by: INTERNAL MEDICINE

## 2021-11-19 PROCEDURE — 80061 LIPID PANEL: CPT | Performed by: INTERNAL MEDICINE

## 2021-11-19 PROCEDURE — 82043 UR ALBUMIN QUANTITATIVE: CPT | Performed by: INTERNAL MEDICINE

## 2021-11-19 PROCEDURE — 99397 PER PM REEVAL EST PAT 65+ YR: CPT | Mod: 25 | Performed by: INTERNAL MEDICINE

## 2021-11-19 PROCEDURE — 96372 THER/PROPH/DIAG INJ SC/IM: CPT | Performed by: INTERNAL MEDICINE

## 2021-11-19 PROCEDURE — 80053 COMPREHEN METABOLIC PANEL: CPT | Performed by: INTERNAL MEDICINE

## 2021-11-19 PROCEDURE — 36415 COLL VENOUS BLD VENIPUNCTURE: CPT | Performed by: INTERNAL MEDICINE

## 2021-11-19 PROCEDURE — 82306 VITAMIN D 25 HYDROXY: CPT | Performed by: INTERNAL MEDICINE

## 2021-11-19 PROCEDURE — 99214 OFFICE O/P EST MOD 30 MIN: CPT | Mod: 25 | Performed by: INTERNAL MEDICINE

## 2021-11-19 PROCEDURE — 91306 COVID-19,PF,MODERNA (18+ YRS BOOSTER .25ML): CPT | Performed by: INTERNAL MEDICINE

## 2021-11-19 RX ORDER — ALLOPURINOL 300 MG/1
300 TABLET ORAL DAILY
Qty: 30 TABLET | Refills: 11 | Status: SHIPPED | OUTPATIENT
Start: 2021-11-19 | End: 2022-09-15

## 2021-11-19 RX ORDER — FAMOTIDINE 20 MG/1
TABLET, FILM COATED ORAL
Qty: 180 TABLET | Refills: 1 | Status: SHIPPED | OUTPATIENT
Start: 2021-11-19 | End: 2023-04-03

## 2021-11-19 RX ORDER — METOPROLOL SUCCINATE 25 MG/1
25 TABLET, EXTENDED RELEASE ORAL DAILY
Qty: 90 TABLET | Refills: 3 | Status: SHIPPED | OUTPATIENT
Start: 2021-11-19 | End: 2022-12-01

## 2021-11-19 RX ORDER — CHOLESTYRAMINE 4 G/9G
4 POWDER, FOR SUSPENSION ORAL DAILY
Qty: 378 G | Refills: 11 | Status: SHIPPED | OUTPATIENT
Start: 2021-11-19 | End: 2023-04-03

## 2021-11-19 RX ORDER — HYDROCHLOROTHIAZIDE 25 MG/1
25 TABLET ORAL DAILY
Qty: 90 TABLET | Refills: 3 | Status: SHIPPED | OUTPATIENT
Start: 2021-11-19 | End: 2022-12-01

## 2021-11-19 RX ORDER — CYANOCOBALAMIN 1000 UG/ML
1000 INJECTION, SOLUTION INTRAMUSCULAR; SUBCUTANEOUS
Status: ACTIVE | OUTPATIENT
Start: 2021-11-19

## 2021-11-19 RX ORDER — LOSARTAN POTASSIUM 100 MG/1
100 TABLET ORAL DAILY
Qty: 90 TABLET | Refills: 3 | Status: SHIPPED | OUTPATIENT
Start: 2021-11-19 | End: 2023-02-13

## 2021-11-19 RX ADMIN — CYANOCOBALAMIN 1000 MCG: 1000 INJECTION, SOLUTION INTRAMUSCULAR; SUBCUTANEOUS at 17:14

## 2021-11-19 ASSESSMENT — ACTIVITIES OF DAILY LIVING (ADL): CURRENT_FUNCTION: NO ASSISTANCE NEEDED

## 2021-11-19 ASSESSMENT — MIFFLIN-ST. JEOR: SCORE: 1358.07

## 2021-11-19 NOTE — LETTER
November 22, 2021      [place] Frank  [address] [place] [address]        Dear ,    We are writing to inform you of your test results.    Your mild diabetes is very well controlled    All of your other blood tests are stable and almost exactly the same as last year    Resulted Orders   Vitamin B12   Result Value Ref Range    Vitamin B12 >2,000 (H) 213 - 816 pg/mL   Vitamin D Deficiency   Result Value Ref Range    Vitamin D, Total (25-Hydroxy) 44 30 - 80 ug/L    Narrative    Deficiency <10.0 ug/L  Insufficiency 10.0-29.9 ug/L  Sufficiency 30.0-80.0 ug/L  Toxicity (possible) >100.0 ug/L    Comprehensive metabolic panel   Result Value Ref Range    Sodium 139 136 - 145 mmol/L    Potassium 4.1 3.5 - 5.0 mmol/L    Chloride 100 98 - 107 mmol/L    Carbon Dioxide (CO2) 26 22 - 31 mmol/L    Anion Gap 13 5 - 18 mmol/L    Urea Nitrogen 12 8 - 28 mg/dL    Creatinine 0.79 0.60 - 1.10 mg/dL    Calcium 9.6 8.5 - 10.5 mg/dL    Glucose 102 70 - 125 mg/dL    Alkaline Phosphatase 82 45 - 120 U/L    AST 32 0 - 40 U/L    ALT 21 0 - 45 U/L    Protein Total 7.4 6.0 - 8.0 g/dL    Albumin 4.1 3.5 - 5.0 g/dL    Bilirubin Total 0.3 0.0 - 1.0 mg/dL    GFR Estimate 68 >60 mL/min/1.73m2      Comment:      As of July 11, 2021, eGFR is calculated by the CKD-EPI creatinine equation, without race adjustment. eGFR can be influenced by muscle mass, exercise, and diet. The reported eGFR is an estimation only and is only applicable if the renal function is stable.   HEMOGLOBIN A1C   Result Value Ref Range    Hemoglobin A1C 6.4 (H) 0.0 - 5.6 %      Comment:      Normal <5.7%   Prediabetes 5.7-6.4%    Diabetes 6.5% or higher     Note: Adopted from ADA consensus guidelines.   Albumin Random Urine Quantitative with Creat Ratio   Result Value Ref Range    Microalbumin Urine mg/dL <0.50 0.00 - 1.99 mg/dL    Creatinine Urine mg/dL 29 mg/dL    Microalbumin Urine mg/g Cr        Comment:      Unable to calculate:  Urine creatinine or  microalbumin value below detectable level    Narrative    Microalbumin, Random Urine   <2.0 mg/dL . . . . . . . . Normal   3.0-30.0 mg/dL . . . . . . Microalbuminuria   >30.0 mg/dL . . . . . .  . Clinical Proteinuria     Microalbumin/Creatinine Ratio, Random Urine   <20 mg/g . . . . .. . . . Normal    mg/g . . . . . . . Microalbuminuria   >300 mg/g . . . . . . . . Clinical Proteinuria   Lipid panel reflex to direct LDL Fasting   Result Value Ref Range    Cholesterol 256 (H) <=199 mg/dL    Triglycerides 215 (H) <=149 mg/dL    Direct Measure HDL 62 >=50 mg/dL      Comment:      HDL Cholesterol Reference Range:     0-2 years:   No reference ranges established for patients under 2 years old  at [facility] for lipid analytes.    2-8 years:  Greater than 45 mg/dL     18 years and older:   Female: Greater than or equal to 50 mg/dL   Male:   Greater than or equal to 40 mg/dL    LDL Cholesterol Calculated 151 (H) <=129 mg/dL    Patient Fasting > 8hrs? No        If you have any questions or concerns, please call the clinic at the number listed above.       Sincerely,      Js Roberson MD

## 2021-11-19 NOTE — PROGRESS NOTES
ANNUAL WELLNESS VISIT--Face to Face    Assessment and Plan:     DX: 1. Medicare annual wellness visit, subsequent  - REVIEW OF HEALTH MAINTENANCE PROTOCOL ORDERS    2. Type 2 diabetes mellitus with diabetic neuropathy, without long-term current use of insulin (H)  - Lipid panel reflex to direct LDL Fasting; Future  - OPTOMETRY REFERRAL; Future  - Albumin Random Urine Quantitative with Creat Ratio; Future  - HEMOGLOBIN A1C; Future  - Comprehensive metabolic panel; Future  - Comprehensive metabolic panel  - HEMOGLOBIN A1C  - Albumin Random Urine Quantitative with Creat Ratio  - Lipid panel reflex to direct LDL Fasting    3. High priority for 2019-nCoV vaccine  - COVID-19,PF,MODERNA (18+ Yrs BOOSTER .25mL)    4. Screening for hyperlipidemia    5. Essential hypertension  - Comprehensive metabolic panel; Future  - losartan (COZAAR) 100 MG tablet; Take 1 tablet (100 mg) by mouth daily  Dispense: 90 tablet; Refill: 3  - hydrochlorothiazide (HYDRODIURIL) 25 MG tablet; Take 1 tablet (25 mg) by mouth daily  Dispense: 90 tablet; Refill: 3  - metoprolol succinate ER (TOPROL-XL) 25 MG 24 hr tablet; Take 1 tablet (25 mg) by mouth daily  Dispense: 90 tablet; Refill: 3  - Comprehensive metabolic panel    6. Gastroesophageal reflux disease with esophagitis without hemorrhage  - famotidine (PEPCID) 20 MG tablet; [FAMOTIDINE (PEPCID) 20 MG TABLET] TAKE ONE TABLET BY MOUTH TWICE DAILY  Dispense: 180 tablet; Refill: 1    7. Neuropathy  - Vitamin B12; Future  - cyanocobalamin injection 1,000 mcg  - Vitamin B12    8. Vitamin D deficiency  - Vitamin D Deficiency; Future  - Vitamin D Deficiency    9. Primary osteoarthritis involving multiple joints  Refill diclofenac.  Empiric trial of allopurinol  - diclofenac (VOLTAREN) 1 % topical gel; Apply 4 g topically 3 times daily  Dispense: 1080 g; Refill: 3  - allopurinol (ZYLOPRIM) 300 MG tablet; Take 1 tablet (300 mg) by mouth daily  Dispense: 30 tablet; Refill: 11    10. Diarrhea due to  malabsorption  Trial of cholestyramine  - cholestyramine (QUESTRAN) 4 GM/DOSE powder; Take 4 g by mouth daily  Dispense: 378 g; Refill: 11       Patient Instructions   covid booster    B 12 shot    Refill meds    Update labs    Try allopurinol 300 mgs daily for a few weeks to lower uric acid, and see if any arthritis pain improves.  If it does, continue taking.  If not stop it    Try cholestyramine powder to absorb bile salts, and see if that helps diarrhea         Return in about 1 year (around 11/19/2022) for in person.    The patient's current medical problems were reviewed.    The following are part of a depression follow up plan for the patient:  management of mental health treatment    Weight management plan: Discussed healthy diet and exercise guidelines    The following health maintenance items are reviewed in Epic and correct as of today:  Health Maintenance Due   Topic Date Due     DIABETIC FOOT EXAM  Never done     DEPRESSION ACTION PLAN  Never done     ZOSTER IMMUNIZATION (2 of 3) 01/15/2008     LIPID  06/28/2013     EYE EXAM  07/13/2019     MICROALBUMIN  04/19/2020     PHQ-9  04/07/2021     FALL RISK ASSESSMENT  10/07/2021     BMP  10/20/2021       Appropriate preventive services were discussed with this patient, including applicable screening as appropriate for cardiovascular disease, diabetes, osteopenia/osteoporosis, and glaucoma.  As appropriate for age/gender, discussed screening for colorectal cancer, prostate cancer, breast cancer, and cervical cancer. Checklist reviewing preventive services available has been given to the patient.    Reviewed patients plan of care and provided an AVS. The Basic Care Plan for Loren meets the Care Plan requirement. This Care Plan has been established and reviewed with the Patient, and printed in the AVS.    The following health maintenance schedule was reviewed with the patient and provided in printed form in the after visit summary:   Health Maintenance   Topic  Date Due     DIABETIC FOOT EXAM  Never done     DEPRESSION ACTION PLAN  Never done     ZOSTER IMMUNIZATION (2 of 3) 01/15/2008     LIPID  06/28/2013     EYE EXAM  07/13/2019     MICROALBUMIN  04/19/2020     PHQ-9  04/07/2021     FALL RISK ASSESSMENT  10/07/2021     BMP  10/20/2021     A1C  05/19/2022     MEDICARE ANNUAL WELLNESS VISIT  11/19/2022     ANNUAL REVIEW OF HM ORDERS  11/19/2022     DTAP/TDAP/TD IMMUNIZATION (2 - Td or Tdap) 06/06/2024     COLORECTAL CANCER SCREENING  08/11/2025     ADVANCE CARE PLANNING  11/19/2026     INFLUENZA VACCINE  Completed     Pneumococcal Vaccine: 65+ Years  Completed     COVID-19 Vaccine  Completed     IPV IMMUNIZATION  Aged Out     MENINGITIS IMMUNIZATION  Aged Out        Subjective:   Loren is a 86 year old female who presents to the clinic today for an Annual Wellness Visit.    CHIEF COMPLAINT:  Chief Complaint   Patient presents with     Wellness Visit     Imm/Inj     COVID-19 VACCINE       HPI: Primary complaint is of diffuse osteoarthritis worse in the hands, knees and back    Loose stools after coffee since having her gallbladder removed.  Minimal stomach acid.  Increased bloating    Takes lorazepam and Ambien at night with good sleep.  Reports mood is okay    Review of Systems: No peripheral edema.  Poorly monitored blood pressure.  No acid heartburn.    Patient Care Team:  Js Roberson MD as PCP - General  Js Roberson MD as Assigned PCP     Patient Active Problem List   Diagnosis     Primary empty sella syndrome (H)     Hyperlipidemia     Gastroesophageal reflux disease with esophagitis     Lower Back Pain     Lymphedema     Major Depression, Single Episode     Neuropathy     Primary insomnia     Unspecified glaucoma     Hearing loss     Primary osteoarthritis involving multiple joints     Essential hypertension with goal blood pressure less than 140/90     Morbid obesity (H)     Hypokalemia     S/P laparoscopic cholecystectomy     Anxiety     Type 2  diabetes mellitus with diabetic neuropathy, without long-term current use of insulin (H)     Past Medical History:   Diagnosis Date     HLD (hyperlipidemia)      HTN (hypertension)       Past Surgical History:   Procedure Laterality Date     C TOTAL ABDOM HYSTERECTOMY      Description: Hysterectomy;  Recorded: 2011;  Comments: at 32 years of age     HC KNEE SCOPE, DIAGNOSTIC      Description: Arthroscopy Knee Left;  Recorded: 2008;     HC KNEE SCOPE, DIAGNOSTIC      Description: Arthroscopy Knee Right;  Recorded: 2008;     HC REDUCTION OF LARGE BREAST      Description: Breast Surgery Reduction Procedure Bilateral;  Recorded: 2008;     LAPAROSCOPIC CHOLECYSTECTOMY N/A 2018    Procedure: CHOLECYSTECTOMY, LAPAROSCOPIC- with cholangiograms;  Surgeon: Breezy Sykes DO;  Location: [facility];  Service: General      No family history on file.   Social History     Socioeconomic History     Marital status: Single     Spouse name: Not on file     Number of children: Not on file     Years of education: Not on file     Highest education level: Not on file   Occupational History     Not on file   Tobacco Use     Smoking status: Never Smoker     Smokeless tobacco: Never Used   Substance and Sexual Activity     Alcohol use: No     Drug use: No     Sexual activity: Never   Other Topics Concern     Not on file   Social History Narrative      2020    Granddaughter handles things for her and lives with her now    Originally from [place].   at age 20, for 64 years     Social Determinants of Health     Financial Resource Strain: Not on file   Food Insecurity: Not on file   Transportation Needs: Not on file   Physical Activity: Not on file   Stress: Not on file   Social Connections: Not on file   Intimate Partner Violence: Not on file   Housing Stability: Not on file      Social History     Social History Narrative      2020    Granddaughter  "handles things for her and lives with her now    Originally from [place].   at age 20, for 64 years       Current Outpatient Medications   Medication Sig Dispense Refill     allopurinol (ZYLOPRIM) 300 MG tablet Take 1 tablet (300 mg) by mouth daily 30 tablet 11     cholecalciferol, vitamin D3, (VITAMIN D3 ORAL) [CHOLECALCIFEROL, VITAMIN D3, (VITAMIN D3 ORAL)] Take 2,000 Units by mouth daily.       cholestyramine (QUESTRAN) 4 GM/DOSE powder Take 4 g by mouth daily 378 g 11     diclofenac (VOLTAREN) 1 % topical gel Apply 4 g topically 3 times daily 1080 g 3     famotidine (PEPCID) 20 MG tablet [FAMOTIDINE (PEPCID) 20 MG TABLET] TAKE ONE TABLET BY MOUTH TWICE DAILY 180 tablet 1     hydrochlorothiazide (HYDRODIURIL) 25 MG tablet Take 1 tablet (25 mg) by mouth daily 90 tablet 3     latanoprost (XALATAN) 0.005 % ophthalmic solution [LATANOPROST (XALATAN) 0.005 % OPHTHALMIC SOLUTION] Use as directed in both eyes daily 2.5 mL 6     LORazepam (ATIVAN) 1 MG tablet TAKE ONE TABLET BY MOUTH DAILY AT BEDTIME. 90 tablet 0     losartan (COZAAR) 100 MG tablet Take 1 tablet (100 mg) by mouth daily 90 tablet 3     metoprolol succinate ER (TOPROL-XL) 25 MG 24 hr tablet Take 1 tablet (25 mg) by mouth daily 90 tablet 3     multivitamin-minerals-lutein (CENTRUM SILVER) tablet [MULTIVITAMIN-MINERALS-LUTEIN (CENTRUM SILVER) TABLET] Take 1 tablet by mouth daily.       zolpidem (AMBIEN) 5 MG tablet Take 1 tablet (5 mg) by mouth At Bedtime 90 tablet 0      Objective:   BP (!) 158/74 (BP Location: Left arm, Patient Position: Sitting, Cuff Size: Adult Large)   Pulse 82   Resp 18   Ht 1.549 m (5' 1\")   Wt 97.6 kg (215 lb 1.6 oz)   SpO2 97%   BMI 40.64 kg/m   Estimated body mass index is 40.64 kg/m  as calculated from the following:    Height as of this encounter: 1.549 m (5' 1\").    Weight as of this encounter: 97.6 kg (215 lb 1.6 oz).    VisionScreening:  No exam data present     PHYSICAL EXAM   Constitutional:  Reveals Energetic " elderly woman accompanied by granddaughter and great granddaughter Cardiac; regular rate and rhythm Palpation of radial pulse  regular Edema of Legs: none Lungs: Clear.  Respiratory effort normal. Psychiatric:  Alert and oriented.  Slightly hard of hearing    Changes of osteoarthritis of the hands    Lab Results   Component Value Date    CHOL 222 06/28/2012       Lab Results   Component Value Date    GLC 79 10/20/2020       No components found for: VITD  No results found for: VITDT    No results found for: PSA    No flowsheet data found.  Cognitive Screening   1) Repeat 3 items (Leader, Season, Table)    2) Clock draw: ABNORMAL Number spacing  3) 3 item recall: Recalls NO objects   Results: 0 items recalled: PROBABLE COGNITIVE IMPAIRMENT, **INFORM PROVIDER**    Mini-CogTM Copyright ABNER Davalos. Licensed by the author for use in [place] [employer]; reprinted with permission ([email or web address]). All rights reserved.    A Mini-Cog score of 0-2 suggests the possibility of dementia, score of 3-5 suggests no dementia    ROOMING STAFF:    Patient has been advised of split billing requirements and indicates understanding: Yes   Are you in the first 12 months of your Medicare coverage?  No    Do you feel safe in your environment? Yes    Have you ever done Advance Care Planning? (For example, a Health Directive, POLST, or a discussion with a medical provider or your loved ones about your wishes): No, advance care planning information given to patient to review.  Patient plans to discuss their wishes with loved ones or provider.         Fall risk  Fallen 2 or more times in the past year?: No  Any fall with injury in the past year?: No  click delete button to remove this line now    Imm/Inj    Healthy Habits:    In general, how would you rate your overall health?  Good    Frequency of exercise:  None    Do you usually eat at least 4 servings of fruit and vegetables a day, include whole grains    & fiber and avoid regularly eating  "high fat or \"junk\" foods?  Yes    Taking medications regularly:  Yes    Medication side effects:  Not applicable    Ability to successfully perform activities of daily living:  No assistance needed    Home Safety:  No safety concerns identified    Hearing Impairment:  Difficulty understanding soft or whispered speech    In the past 6 months, have you been bothered by leaking of urine?  No    In general, how would you rate your overall mental or emotional health?  Good      PHQ-2 Total Score:    Additional concerns today:  No      Do you have sleep apnea, excessive snoring or daytime drowsiness?: no    Start Time:4:38 PM  End time:  5:02 PM  Face to face plus orders: 24 minutes  Documentation time:  3 minutes    The visit lasted a total of 27 minutes     Reviewed and updated as needed this visit by clinical staff  Tobacco  Allergies  Meds             Js Roberson MD  [facility]  "

## 2021-11-19 NOTE — PATIENT INSTRUCTIONS
covid booster    B 12 shot    Refill meds    Update labs    Try allopurinol 300 mgs daily for a few weeks to lower uric acid, and see if any arthritis pain improves.  If it does, continue taking.  If not stop it    Try cholestyramine powder to absorb bile salts, and see if that helps diarrhea

## 2021-11-20 ASSESSMENT — PATIENT HEALTH QUESTIONNAIRE - PHQ9: SUM OF ALL RESPONSES TO PHQ QUESTIONS 1-9: 2

## 2021-11-22 LAB — DEPRECATED CALCIDIOL+CALCIFEROL SERPL-MC: 44 UG/L (ref 30–80)

## 2022-02-01 ENCOUNTER — TELEPHONE (OUTPATIENT)
Dept: INTERNAL MEDICINE | Facility: CLINIC | Age: 87
End: 2022-02-01
Payer: COMMERCIAL

## 2022-02-01 NOTE — TELEPHONE ENCOUNTER
PT'S DAUGHTER BONNIE DROPPED OFF AN APPLICATION FOR DISABILITY PARKING CERTIFICATE THAT HER MOTHER IS REQUESTING TO HAVE COMPLETED. ONCE DR MAYA HAS COMPLETED THE DISABILITY PARKING FORMS PT'S DAUGHTER BONNIE WOULD LIKE US TO EITHER CALL HER [phone] OR HER MOTHER [phone] AND LET THEM EITHER OF THEM KNOW THE FORMS ARE DONE AND READY TO BE PICKED UP FROM THE CLINIC.

## 2022-02-04 DIAGNOSIS — F51.01 PRIMARY INSOMNIA: ICD-10-CM

## 2022-02-04 DIAGNOSIS — F41.9 ANXIETY: ICD-10-CM

## 2022-02-04 RX ORDER — ZOLPIDEM TARTRATE 5 MG/1
5 TABLET ORAL AT BEDTIME
Qty: 90 TABLET | Refills: 0 | Status: SHIPPED | OUTPATIENT
Start: 2022-02-04 | End: 2022-08-29

## 2022-02-04 NOTE — TELEPHONE ENCOUNTER
Pending Prescriptions:                       Disp   Refills    zolpidem (AMBIEN) 5 MG tablet             90 tab*0            Sig: Take 1 tablet (5 mg) by mouth At Bedtime    Medication:  Zolpidem    CSA in last year: NO    Random Utox in last year: NO  (if any of the above answer NO - schedule with PCP)     On opioids in addition to benzodiazepines? NO  (if the above answer YES - schedule with PCP every 6 months)       PROVIDER TO PULL THE FOLLOWING FROM  :    1. Last date filled?  2.   Only PCP Prescribing?  3.   Taken as prescribed from physician notes?

## 2022-04-05 DIAGNOSIS — F41.9 ANXIETY: ICD-10-CM

## 2022-04-05 DIAGNOSIS — F51.01 PRIMARY INSOMNIA: ICD-10-CM

## 2022-04-06 NOTE — TELEPHONE ENCOUNTER
Routing refill request to provider for review/approval because:  Controlled substance.     Last Written Prescription Date:  8/19/2021  Last Fill Quantity: 90,  # refills: 0   Last office visit provider:  11/19/2021     Requested Prescriptions   Pending Prescriptions Disp Refills     LORazepam (ATIVAN) 1 MG tablet [Pharmacy Med Name: LORazepam Oral Tablet 1 MG] 90 tablet 0     Sig: TAKE ONE TABLET BY MOUTH DAILY AT BEDTIME.       There is no refill protocol information for this order          Mahnaz Deleon RN 04/06/22 2:25 PM

## 2022-04-07 RX ORDER — LORAZEPAM 1 MG/1
TABLET ORAL
Qty: 90 TABLET | Refills: 0 | Status: SHIPPED | OUTPATIENT
Start: 2022-04-07 | End: 2022-07-25

## 2022-04-07 NOTE — TELEPHONE ENCOUNTER
Controlled Substance Refill Request for Ativan    Last refill: 8/19/2021 for 90 with 0    Last clinic visit: 11/19/2021     Clinic visit frequency required: Yearly  Next appt: Nothing scheduled    Controlled substance agreement on file: Yes:  Date 12/12/2018.    Documentation in problem list reviewed:  Yes    Processing:  Rx to be electronically transmitted to pharmacy by provider      Herrera Pollard RN  [facility]

## 2022-07-01 ENCOUNTER — NURSE TRIAGE (OUTPATIENT)
Dept: NURSING | Facility: CLINIC | Age: 87
End: 2022-07-01

## 2022-07-01 NOTE — TELEPHONE ENCOUNTER
"Pt's granddaughter of the same name calling. Loren reports pt has had \"high blood pressure past couple of days, in the 190's, edema in legs and feet, takes losartan and metoprolol, no missed doses, dizziness today\".    Advised Loren per protocol pt should be seen in the ER.     Loren verbalizes understanding and agrees to plan.     Reason for Disposition    [1] Systolic BP  >= 160 OR Diastolic >= 100 AND [2] cardiac or neurologic symptoms (e.g., chest pain, difficulty breathing, unsteady gait, blurred vision)    Additional Information    Negative: Symptom is main concern  (e.g., headache, chest pain)    Negative: Low blood pressure is main concern    Negative: Difficult to awaken or acting confused (e.g., disoriented, slurred speech)    Negative: Severe difficulty breathing (e.g., struggling for each breath, speaks in single words)    Negative: [1] Weakness of the face, arm or leg on one side of the body AND [2] new onset    Negative: [1] Numbness (i.e., loss of sensation) of the face, arm or leg on one side of the body AND [2] new onset    Negative: [1] Chest pain lasts > 5 minutes AND [2] history of heart disease  (i.e., heart attack, bypass surgery, angina, angioplasty, CHF)    Negative: [1] Chest pain AND [2] took nitrogylcerin AND [3] pain was not relieved    Negative: Sounds like a life-threatening emergency to the triager    Protocols used: HIGH BLOOD PRESSURE-A-AH      "

## 2022-07-22 DIAGNOSIS — F41.9 ANXIETY: ICD-10-CM

## 2022-07-22 DIAGNOSIS — F51.01 PRIMARY INSOMNIA: ICD-10-CM

## 2022-07-25 RX ORDER — LORAZEPAM 1 MG/1
TABLET ORAL
Qty: 90 TABLET | Refills: 0 | Status: SHIPPED | OUTPATIENT
Start: 2022-07-25 | End: 2022-08-19

## 2022-08-19 DIAGNOSIS — F51.01 PRIMARY INSOMNIA: ICD-10-CM

## 2022-08-19 DIAGNOSIS — F41.9 ANXIETY: ICD-10-CM

## 2022-08-19 RX ORDER — LORAZEPAM 1 MG/1
TABLET ORAL
Qty: 90 TABLET | Refills: 0 | Status: SHIPPED | OUTPATIENT
Start: 2022-08-19 | End: 2023-01-26

## 2022-08-27 DIAGNOSIS — F51.01 PRIMARY INSOMNIA: ICD-10-CM

## 2022-08-27 DIAGNOSIS — F41.9 ANXIETY: ICD-10-CM

## 2022-08-29 RX ORDER — ZOLPIDEM TARTRATE 5 MG/1
5 TABLET ORAL AT BEDTIME
Qty: 90 TABLET | Refills: 0 | Status: SHIPPED | OUTPATIENT
Start: 2022-08-29 | End: 2022-12-01

## 2022-09-15 ENCOUNTER — OFFICE VISIT (OUTPATIENT)
Dept: INTERNAL MEDICINE | Facility: CLINIC | Age: 87
End: 2022-09-15
Payer: COMMERCIAL

## 2022-09-15 VITALS
BODY MASS INDEX: 40.09 KG/M2 | SYSTOLIC BLOOD PRESSURE: 160 MMHG | DIASTOLIC BLOOD PRESSURE: 80 MMHG | RESPIRATION RATE: 16 BRPM | WEIGHT: 212.2 LBS | HEART RATE: 72 BPM | OXYGEN SATURATION: 98 %

## 2022-09-15 DIAGNOSIS — N95.1 SYMPTOMATIC MENOPAUSAL OR FEMALE CLIMACTERIC STATES: ICD-10-CM

## 2022-09-15 DIAGNOSIS — Z78.0 ASYMPTOMATIC MENOPAUSAL STATE: ICD-10-CM

## 2022-09-15 DIAGNOSIS — I10 ESSENTIAL HYPERTENSION WITH GOAL BLOOD PRESSURE LESS THAN 140/90: ICD-10-CM

## 2022-09-15 DIAGNOSIS — K21.00 GASTROESOPHAGEAL REFLUX DISEASE WITH ESOPHAGITIS WITHOUT HEMORRHAGE: ICD-10-CM

## 2022-09-15 DIAGNOSIS — E66.01 MORBID OBESITY (H): ICD-10-CM

## 2022-09-15 DIAGNOSIS — E23.0: ICD-10-CM

## 2022-09-15 DIAGNOSIS — F41.9 ANXIETY: ICD-10-CM

## 2022-09-15 DIAGNOSIS — M19.041 ARTHRITIS OF RIGHT HAND: ICD-10-CM

## 2022-09-15 DIAGNOSIS — R20.0 HAND NUMBNESS: ICD-10-CM

## 2022-09-15 DIAGNOSIS — E11.40 TYPE 2 DIABETES MELLITUS WITH DIABETIC NEUROPATHY, WITHOUT LONG-TERM CURRENT USE OF INSULIN (H): Primary | ICD-10-CM

## 2022-09-15 LAB
ALBUMIN SERPL BCG-MCNC: 4.2 G/DL (ref 3.5–5.2)
ALP SERPL-CCNC: 72 U/L (ref 35–104)
ALT SERPL W P-5'-P-CCNC: 18 U/L (ref 10–35)
ANION GAP SERPL CALCULATED.3IONS-SCNC: 7 MMOL/L (ref 7–15)
AST SERPL W P-5'-P-CCNC: 31 U/L (ref 10–35)
BILIRUB SERPL-MCNC: 0.4 MG/DL
BUN SERPL-MCNC: 12.8 MG/DL (ref 8–23)
CALCIUM SERPL-MCNC: 9.3 MG/DL (ref 8.8–10.2)
CHLORIDE SERPL-SCNC: 101 MMOL/L (ref 98–107)
CHOLEST SERPL-MCNC: 234 MG/DL
CREAT SERPL-MCNC: 0.73 MG/DL (ref 0.51–0.95)
DEPRECATED HCO3 PLAS-SCNC: 31 MMOL/L (ref 22–29)
GFR SERPL CREATININE-BSD FRML MDRD: 80 ML/MIN/1.73M2
GLUCOSE SERPL-MCNC: 112 MG/DL (ref 70–99)
HBA1C MFR BLD: 6.4 % (ref 0–5.6)
HDLC SERPL-MCNC: 72 MG/DL
LDLC SERPL CALC-MCNC: 149 MG/DL
NONHDLC SERPL-MCNC: 162 MG/DL
POTASSIUM SERPL-SCNC: 4.4 MMOL/L (ref 3.4–5.3)
PROT SERPL-MCNC: 7.5 G/DL (ref 6.4–8.3)
SODIUM SERPL-SCNC: 139 MMOL/L (ref 136–145)
TRIGL SERPL-MCNC: 65 MG/DL
TSH SERPL DL<=0.005 MIU/L-ACNC: 2.26 UIU/ML (ref 0.3–4.2)
URATE SERPL-MCNC: 4.8 MG/DL (ref 2.4–5.7)

## 2022-09-15 PROCEDURE — 80053 COMPREHEN METABOLIC PANEL: CPT | Performed by: INTERNAL MEDICINE

## 2022-09-15 PROCEDURE — 84550 ASSAY OF BLOOD/URIC ACID: CPT | Performed by: INTERNAL MEDICINE

## 2022-09-15 PROCEDURE — 36415 COLL VENOUS BLD VENIPUNCTURE: CPT | Performed by: INTERNAL MEDICINE

## 2022-09-15 PROCEDURE — 99214 OFFICE O/P EST MOD 30 MIN: CPT | Mod: 25 | Performed by: INTERNAL MEDICINE

## 2022-09-15 PROCEDURE — 80061 LIPID PANEL: CPT | Performed by: INTERNAL MEDICINE

## 2022-09-15 PROCEDURE — 83036 HEMOGLOBIN GLYCOSYLATED A1C: CPT | Performed by: INTERNAL MEDICINE

## 2022-09-15 PROCEDURE — G0008 ADMIN INFLUENZA VIRUS VAC: HCPCS | Performed by: INTERNAL MEDICINE

## 2022-09-15 PROCEDURE — 90662 IIV NO PRSV INCREASED AG IM: CPT | Performed by: INTERNAL MEDICINE

## 2022-09-15 PROCEDURE — 84443 ASSAY THYROID STIM HORMONE: CPT | Performed by: INTERNAL MEDICINE

## 2022-09-15 RX ORDER — METOPROLOL SUCCINATE 50 MG/1
50 TABLET, EXTENDED RELEASE ORAL DAILY
Qty: 90 TABLET | Refills: 3 | Status: SHIPPED | OUTPATIENT
Start: 2022-09-15 | End: 2023-10-20

## 2022-09-15 ASSESSMENT — PATIENT HEALTH QUESTIONNAIRE - PHQ9
SUM OF ALL RESPONSES TO PHQ QUESTIONS 1-9: 0
10. IF YOU CHECKED OFF ANY PROBLEMS, HOW DIFFICULT HAVE THESE PROBLEMS MADE IT FOR YOU TO DO YOUR WORK, TAKE CARE OF THINGS AT HOME, OR GET ALONG WITH OTHER PEOPLE: NOT DIFFICULT AT ALL
SUM OF ALL RESPONSES TO PHQ QUESTIONS 1-9: 0

## 2022-09-15 ASSESSMENT — PAIN SCALES - GENERAL: PAINLEVEL: SEVERE PAIN (6)

## 2022-09-15 NOTE — LETTER
September 18, 2022      [place] P Frank  [address] [place] [address]        Dear ,    We are writing to inform you of your test results.    The diabetic test is in the well controlled range .  The electrolytes, kidney tests are all normal .   Liver tests are normal   Cholesterol levels are high .   Thyroid test and uric acid are normal .  I do recommend starting a low dose cholesterol pill . I am sending in a low dose prescription .     Resulted Orders   HEMOGLOBIN A1C   Result Value Ref Range    Hemoglobin A1C 6.4 (H) 0.0 - 5.6 %      Comment:      Normal <5.7%   Prediabetes 5.7-6.4%    Diabetes 6.5% or higher     Note: Adopted from ADA consensus guidelines.   Comprehensive metabolic panel   Result Value Ref Range    Sodium 139 136 - 145 mmol/L    Potassium 4.4 3.4 - 5.3 mmol/L    Chloride 101 98 - 107 mmol/L    Carbon Dioxide (CO2) 31 (H) 22 - 29 mmol/L    Anion Gap 7 7 - 15 mmol/L    Urea Nitrogen 12.8 8.0 - 23.0 mg/dL    Creatinine 0.73 0.51 - 0.95 mg/dL    Calcium 9.3 8.8 - 10.2 mg/dL    Glucose 112 (H) 70 - 99 mg/dL    Alkaline Phosphatase 72 35 - 104 U/L    AST 31 10 - 35 U/L    ALT 18 10 - 35 U/L    Protein Total 7.5 6.4 - 8.3 g/dL    Albumin 4.2 3.5 - 5.2 g/dL    Bilirubin Total 0.4 <=1.2 mg/dL    GFR Estimate 80 >60 mL/min/1.73m2      Comment:      Effective December 21, 2021 eGFRcr in adults is calculated using the 2021 CKD-EPI creatinine equation which includes age and gender (Chapis et al., NEJM, DOI: 10.1056/EGJJmi9311280)   Lipid panel reflex to direct LDL Fasting   Result Value Ref Range    Cholesterol 234 (H) <200 mg/dL    Triglycerides 65 <150 mg/dL    Direct Measure HDL 72 >=50 mg/dL    LDL Cholesterol Calculated 149 (H) <=100 mg/dL    Non HDL Cholesterol 162 (H) <130 mg/dL    Narrative    Cholesterol  Desirable:  <200 mg/dL    Triglycerides  Normal:  Less than 150 mg/dL  Borderline High:  150-199 mg/dL  High:  200-499 mg/dL  Very High:  Greater than or equal to 500  mg/dL    Direct Measure HDL  Female:  Greater than or equal to 50 mg/dL   Male:  Greater than or equal to 40 mg/dL    LDL Cholesterol  Desirable:  <100mg/dL  Above Desirable:  100-129 mg/dL   Borderline High:  130-159 mg/dL   High:  160-189 mg/dL   Very High:  >= 190 mg/dL    Non HDL Cholesterol  Desirable:  130 mg/dL  Above Desirable:  130-159 mg/dL  Borderline High:  160-189 mg/dL  High:  190-219 mg/dL  Very High:  Greater than or equal to 220 mg/dL   TSH   Result Value Ref Range    TSH 2.26 0.30 - 4.20 uIU/mL   Uric acid   Result Value Ref Range    Uric Acid 4.8 2.4 - 5.7 mg/dL       If you have any questions or concerns, please call the clinic at the number listed above.   It was a pleasure to see you the other day       Sincerely,      Doris Bryan MD

## 2022-09-15 NOTE — PATIENT INSTRUCTIONS
Increase metoprolol to 50mg by taking tow of the 25mg till they run out then start 50mg once daily   Shingrix /shingles at the pharmacy

## 2022-09-15 NOTE — PROGRESS NOTES
Assessment & Plan     Type 2 diabetes mellitus with diabetic neuropathy, without long-term current use of insulin (H)  Lab Results   Component Value Date    A1C 6.4 09/15/2022    A1C 6.4 11/19/2021    A1C 5.8 10/20/2020    A1C 6.2 11/19/2019    A1C 6.1 04/19/2019     Well-controlled on diet alone.  She appears to have an aspirin allergy and was taken off a statin.  We will review her lipids and look at her past history.  - HEMOGLOBIN A1C  - Comprehensive metabolic panel  - Lipid panel reflex to direct LDL Fasting  - TSH  - HEMOGLOBIN A1C  - Comprehensive metabolic panel  - Lipid panel reflex to direct LDL Fasting  - TSH    Anxiety  She is tending to be anxious.  This does cause lability in her blood pressure.  Do recommend increasing the metoprolol.  She is on the highest dose of losartan.  She can go up to 50 mg of the metoprolol.  She can send me readings over the next few weeks.  Gastroesophageal reflux disease with esophagitis without hemorrhage      Essential hypertension with goal blood pressure less than 140/90    - metoprolol succinate ER (TOPROL XL) 50 MG 24 hr tablet  Dispense: 90 tablet; Refill: 3    Hand numbness  She probably has mild carpal tunnel but she declines getting it investigated.    Arthritis of right hand  Her biggest issue is arthritis of the hand.  We will check a uric acid.  - Uric acid  - Uric acid    Symptomatic menopausal or female climacteric states  We discussed testing at her age most important test is bone density scan.  She is aged out of colon cancer screening and mammograms.  Discussed calcium and vitamin D.  - DX Hip/Pelvis/Spine    Asymptomatic menopausal state     - DX Hip/Pelvis/Spine  She has a history of postcholecystectomy diarrhea.  And uses Questran that may be helping her cholesterol as well.  She has a history of insomnia and uses lorazepam and Ambien at night.  Prescribed by her PCP she would like to continue both.  Consider tapering off Ambien.    She is otherwise  "fully functional in her ADLs and IADLs.  Driving is the only thing she does not do and her granddaughter helps her with that.       BMI:   Estimated body mass index is 40.09 kg/m  as calculated from the following:    Height as of 11/19/21: 1.549 m (5' 1\").    Weight as of this encounter: 96.3 kg (212 lb 3.2 oz).       She is welcome to come for wellness visits with me in person.  And can continue to have Dr. Roberson as PCP    Return for Routine preventive, with me.    Doris Bryan MD  [facility]   Loren is a 86 year old very pleasant patient accompanied by her granddaughter, presenting for the following health issues:  Recheck Medication and Hypertension (High BP last several months, and Bilateral leg edema, Right arm numbness and pain in trigger finger)  [facility] sees Dr jermaine newton   Was on statin and was taken off and taken off baby aspirin   History of Present Illness       Reason for visit:  Medication adjustment    She eats 4 or more servings of fruits and vegetables daily.She consumes 0 sweetened beverage(s) daily.She exercises with enough effort to increase her heart rate 20 to 29 minutes per day.  She exercises with enough effort to increase her heart rate 6 days per week.   She is taking medications regularly.    Today's PHQ-9         PHQ-9 Total Score: 0    PHQ-9 Q9 Thoughts of better off dead/self-harm past 2 weeks :   Not at all    How difficult have these problems made it for you to do your work, take care of things at home, or get along with other people: Not difficult at all       Patient Active Problem List   Diagnosis     Primary empty sella syndrome (H)     Hyperlipidemia     Gastroesophageal reflux disease with esophagitis     Lower Back Pain     Lymphedema     Major Depression, Single Episode     Neuropathy     Primary insomnia     Unspecified glaucoma     Hearing loss     Primary osteoarthritis involving multiple joints     Essential " hypertension with goal blood pressure less than 140/90     Morbid obesity (H)     Hypokalemia     S/P laparoscopic cholecystectomy     Anxiety     Type 2 diabetes mellitus with diabetic neuropathy, without long-term current use of insulin (H)     Current Outpatient Medications   Medication     cholecalciferol, vitamin D3, (VITAMIN D3 ORAL)     cholestyramine (QUESTRAN) 4 GM/DOSE powder     diclofenac (VOLTAREN) 1 % topical gel     famotidine (PEPCID) 20 MG tablet     hydrochlorothiazide (HYDRODIURIL) 25 MG tablet     latanoprost (XALATAN) 0.005 % ophthalmic solution     LORazepam (ATIVAN) 1 MG tablet     losartan (COZAAR) 100 MG tablet     metoprolol succinate ER (TOPROL XL) 50 MG 24 hr tablet     metoprolol succinate ER (TOPROL-XL) 25 MG 24 hr tablet     multivitamin-minerals-lutein (CENTRUM SILVER) tablet     zolpidem (AMBIEN) 5 MG tablet     Current Facility-Administered Medications   Medication     cyanocobalamin injection 1,000 mcg       Hypertension Follow-up      Do you check your blood pressure regularly outside of the clinic? Yes     Are you following a low salt diet? Yes    Are your blood pressures ever more than 140 on the top number (systolic) OR more   than 90 on the bottom number (diastolic), for example 140/90? Yes      Review of Systems         Objective    BP (!) 203/81 (BP Location: Left arm, Patient Position: Sitting, Cuff Size: Adult Large)   Pulse 72   Resp 16   Wt 96.3 kg (212 lb 3.2 oz)   SpO2 98%   BMI 40.09 kg/m    Body mass index is 40.09 kg/m .  Physical Exam   GENERAL: healthy, alert and no distress  NECK: no adenopathy, no asymmetry, masses, or scars and thyroid normal to palpation  RESP: lungs clear to auscultation - no rales, rhonchi or wheezes  CV: regular rate and rhythm, normal S1 S2, no S3 or S4, no murmur, click or rub, no peripheral edema and peripheral pulses strong

## 2022-09-18 RX ORDER — PRAVASTATIN SODIUM 20 MG
20 TABLET ORAL DAILY
Qty: 90 TABLET | Refills: 3 | Status: SHIPPED | OUTPATIENT
Start: 2022-09-18 | End: 2023-10-20

## 2022-10-10 ENCOUNTER — ANCILLARY PROCEDURE (OUTPATIENT)
Dept: BONE DENSITY | Facility: CLINIC | Age: 87
End: 2022-10-10
Attending: INTERNAL MEDICINE
Payer: COMMERCIAL

## 2022-10-10 DIAGNOSIS — Z78.0 ASYMPTOMATIC MENOPAUSAL STATE: ICD-10-CM

## 2022-10-10 DIAGNOSIS — N95.1 SYMPTOMATIC MENOPAUSAL OR FEMALE CLIMACTERIC STATES: ICD-10-CM

## 2022-10-10 DIAGNOSIS — Z78.0 POST-MENOPAUSAL: ICD-10-CM

## 2022-10-10 PROCEDURE — 77081 DXA BONE DENSITY APPENDICULR: CPT | Performed by: INTERNAL MEDICINE

## 2022-10-10 PROCEDURE — 77080 DXA BONE DENSITY AXIAL: CPT | Mod: 59 | Performed by: INTERNAL MEDICINE

## 2022-10-13 ENCOUNTER — TELEPHONE (OUTPATIENT)
Dept: INTERNAL MEDICINE | Facility: CLINIC | Age: 87
End: 2022-10-13

## 2022-10-13 NOTE — TELEPHONE ENCOUNTER
Test Results        Who ordered the test:  Dr Bryan    Type of test: Bone Density    Date of test:  10/1/2022    Where was the test performed:  [place]    What are your questions/concerns?:  Needing the results     Okay to leave a detailed message?: Yes at Cell number on file:    Telephone Information:   Mobile [phone]

## 2022-10-21 NOTE — TELEPHONE ENCOUNTER
I sent her a letter result . She has a normal bone density which is excellent. Please resend letter incase

## 2022-12-01 DIAGNOSIS — F41.9 ANXIETY: ICD-10-CM

## 2022-12-01 DIAGNOSIS — F51.01 PRIMARY INSOMNIA: ICD-10-CM

## 2022-12-01 DIAGNOSIS — I10 ESSENTIAL HYPERTENSION: ICD-10-CM

## 2022-12-01 RX ORDER — ZOLPIDEM TARTRATE 5 MG/1
5 TABLET ORAL AT BEDTIME
Qty: 90 TABLET | Refills: 0 | Status: SHIPPED | OUTPATIENT
Start: 2022-12-01 | End: 2023-01-26

## 2022-12-01 RX ORDER — HYDROCHLOROTHIAZIDE 25 MG/1
25 TABLET ORAL DAILY
Qty: 90 TABLET | Refills: 0 | Status: SHIPPED | OUTPATIENT
Start: 2022-12-01 | End: 2023-02-13

## 2022-12-01 RX ORDER — METOPROLOL SUCCINATE 25 MG/1
TABLET, EXTENDED RELEASE ORAL
Qty: 90 TABLET | Refills: 0 | Status: SHIPPED | OUTPATIENT
Start: 2022-12-01 | End: 2023-01-26

## 2022-12-22 ENCOUNTER — TELEPHONE (OUTPATIENT)
Dept: INTERNAL MEDICINE | Facility: CLINIC | Age: 87
End: 2022-12-22

## 2022-12-22 NOTE — TELEPHONE ENCOUNTER
12/22/2022 called and informed pt of 1/26/2023 appt cancellation. Rescheduled with Dr Marquez in order to keep same day appt per pt request.

## 2023-01-26 ENCOUNTER — OFFICE VISIT (OUTPATIENT)
Dept: INTERNAL MEDICINE | Facility: CLINIC | Age: 88
End: 2023-01-26
Payer: COMMERCIAL

## 2023-01-26 VITALS
HEART RATE: 76 BPM | BODY MASS INDEX: 40.44 KG/M2 | DIASTOLIC BLOOD PRESSURE: 86 MMHG | HEIGHT: 61 IN | OXYGEN SATURATION: 98 % | SYSTOLIC BLOOD PRESSURE: 172 MMHG | WEIGHT: 214.2 LBS

## 2023-01-26 DIAGNOSIS — R13.19 ESOPHAGEAL DYSPHAGIA: ICD-10-CM

## 2023-01-26 DIAGNOSIS — F41.9 ANXIETY: ICD-10-CM

## 2023-01-26 DIAGNOSIS — F51.01 PRIMARY INSOMNIA: ICD-10-CM

## 2023-01-26 DIAGNOSIS — I10 ESSENTIAL HYPERTENSION: Primary | ICD-10-CM

## 2023-01-26 DIAGNOSIS — K21.00 GASTROESOPHAGEAL REFLUX DISEASE WITH ESOPHAGITIS WITHOUT HEMORRHAGE: ICD-10-CM

## 2023-01-26 DIAGNOSIS — E11.40 TYPE 2 DIABETES MELLITUS WITH DIABETIC NEUROPATHY, WITHOUT LONG-TERM CURRENT USE OF INSULIN (H): ICD-10-CM

## 2023-01-26 PROCEDURE — 99214 OFFICE O/P EST MOD 30 MIN: CPT | Performed by: INTERNAL MEDICINE

## 2023-01-26 RX ORDER — AMLODIPINE BESYLATE 5 MG/1
5 TABLET ORAL DAILY
Qty: 90 TABLET | Refills: 4 | Status: SHIPPED | OUTPATIENT
Start: 2023-01-26 | End: 2024-02-07

## 2023-01-26 RX ORDER — LORAZEPAM 1 MG/1
TABLET ORAL
Qty: 90 TABLET | Refills: 0 | Status: SHIPPED | OUTPATIENT
Start: 2023-01-26 | End: 2023-04-27

## 2023-01-26 RX ORDER — ZOLPIDEM TARTRATE 5 MG/1
5 TABLET ORAL AT BEDTIME
Qty: 90 TABLET | Refills: 0 | Status: SHIPPED | OUTPATIENT
Start: 2023-01-26 | End: 2023-07-17

## 2023-01-26 NOTE — PROGRESS NOTES
1. Type 2 diabetes mellitus with diabetic neuropathy, without long-term current use of insulin (H)    2. Essential hypertension  Her blood pressure is a bit elevated today add amlodipine  - amLODIPine (NORVASC) 5 MG tablet; Take 1 tablet (5 mg) by mouth daily  Dispense: 90 tablet; Refill: 4    3. Gastroesophageal reflux disease with esophagitis without hemorrhage  Patient with a long history of gastritis, esophagitis and reflux, now with symptoms of dysphagia esophageal.  She had an endoscopy in 2006 which we reviewed which showed these findings.  In 2010 she had an additional endoscopy which showed a stricture which was dilated and in hindsight she states this was helpful.  She is continued on famotidine but is not on a PPI currently.  I like her to add a PPI.  We discussed repeat endoscopy versus esophagram for evaluation of her dysphagia symptoms and she is elected to start with an esophagram.  - XR Esophagram; Future  - omeprazole (PRILOSEC) 20 MG DR capsule; Take 1 capsule (20 mg) by mouth daily  Dispense: 90 capsule; Refill: 4    4. Esophageal dysphagia  - XR Esophagram; Future    Lizy Pimentel is a 87 year old accompanied by her Grandaughter, presenting for the following health issues:  Chest Pain (chest pain that started 3 weeks go /Pain radiates to the back shoulder blades /Was given medication for heart burn- Pepcid /) and Jaw Pain (Sx started 3wks- a month ago /Pain is on the right side- states ear hurts from time to time )    History of Present Illness       Reason for visit:  Chest pain and jaw pain  Symptom onset:  3-4 weeks ago  Symptoms include:  Pain  Symptom intensity:  Moderate  Symptom progression:  Staying the same  Had these symptoms before:  No    She eats 4 or more servings of fruits and vegetables daily.She consumes 0 sweetened beverage(s) daily.She exercises with enough effort to increase her heart rate 30 to 60 minutes per day.  She exercises with enough effort to increase her  "heart rate 5 days per week.   She is taking medications regularly.     Review of Systems       Objective    BP (!) 172/86 (BP Location: Left arm, Patient Position: Sitting, Cuff Size: Adult Regular)   Pulse 76   Ht 1.54 m (5' 0.63\")   Wt 97.2 kg (214 lb 3.2 oz)   SpO2 98%   BMI 40.97 kg/m    Body mass index is 40.97 kg/m .  Physical Exam   Heart rate controlled rhythm regular lungs clear to auscultation bilaterally abdomen soft nontender no edema            "

## 2023-01-30 ENCOUNTER — TELEPHONE (OUTPATIENT)
Dept: INTERNAL MEDICINE | Facility: CLINIC | Age: 88
End: 2023-01-30
Payer: COMMERCIAL

## 2023-01-30 DIAGNOSIS — B02.8 HERPES ZOSTER WITH COMPLICATION: Primary | ICD-10-CM

## 2023-01-30 RX ORDER — FAMCICLOVIR 500 MG/1
500 TABLET ORAL 3 TIMES DAILY
Qty: 21 TABLET | Refills: 0 | Status: SHIPPED | OUTPATIENT
Start: 2023-01-30 | End: 2023-07-17

## 2023-01-30 NOTE — TELEPHONE ENCOUNTER
"Spoke with patient and chris Pimentel (consent to communicate).  Patient states she had shingles in the past on the right side of her neck and up to her hairline.      Currently having some swelling and discomfort that she states is very similar to when she had shingles in the past.  Symptoms began about a week ago.  Patient states it is \"tender by her ear\" and she is having low grade headahe.  Patient was seen by Dr Marquez on 01/26 but forgot to have him look at it.     Per hong review patient had shingles of the facial nerve in 2019.  "

## 2023-01-30 NOTE — TELEPHONE ENCOUNTER
General Call      Reason for Call:  Granddaughter Loren callling stating patient feels she may have shingles near her right eye, this has been going since 1/26/2023    Please advise    What are your questions or concerns:  n/a    Date of last appointment with provider: n/a    Okay to leave a detailed message?: Yes at Other phone number:  [phone] - charles Pimentel
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
## 2023-02-11 DIAGNOSIS — I10 ESSENTIAL HYPERTENSION: ICD-10-CM

## 2023-02-13 RX ORDER — LOSARTAN POTASSIUM 100 MG/1
TABLET ORAL
Qty: 90 TABLET | Refills: 0 | Status: SHIPPED | OUTPATIENT
Start: 2023-02-13 | End: 2023-05-27

## 2023-02-13 RX ORDER — HYDROCHLOROTHIAZIDE 25 MG/1
25 TABLET ORAL DAILY
Qty: 90 TABLET | Refills: 0 | Status: SHIPPED | OUTPATIENT
Start: 2023-02-13 | End: 2023-05-27

## 2023-02-13 NOTE — TELEPHONE ENCOUNTER
"Routing refill request to provider for review/approval because:  bp out of range    Last Written Prescription Date:  12/1/22  Last Fill Quantity: 90,  # refills: 0   Last office visit provider:  1/26/23     Requested Prescriptions   Pending Prescriptions Disp Refills     hydrochlorothiazide (HYDRODIURIL) 25 MG tablet [Pharmacy Med Name: hydroCHLOROthiazide Oral Tablet 25 MG] 90 tablet 0     Sig: Take 1 tablet (25 mg) by mouth daily       Diuretics (Including Combos) Protocol Failed - 2/11/2023  3:06 PM        Failed - Blood pressure under 140/90 in past 12 months     BP Readings from Last 3 Encounters:   01/26/23 (!) 172/86   09/15/22 (!) 160/80   11/19/21 (!) 158/74                 Passed - Recent (12 mo) or future (30 days) visit within the authorizing provider's specialty     Patient has had an office visit with the authorizing provider or a provider within the authorizing providers department within the previous 12 mos or has a future within next 30 days. See \"Patient Info\" tab in inbasket, or \"Choose Columns\" in Meds & Orders section of the refill encounter.              Passed - Medication is active on med list        Passed - Patient is age 18 or older        Passed - No active pregancy on record        Passed - Normal serum creatinine on file in past 12 months     Recent Labs   Lab Test 09/15/22  1217   CR 0.73              Passed - Normal serum potassium on file in past 12 months     Recent Labs   Lab Test 09/15/22  1217   POTASSIUM 4.4                    Passed - Normal serum sodium on file in past 12 months     Recent Labs   Lab Test 09/15/22  1217                 Passed - No positive pregnancy test in past 12 months           losartan (COZAAR) 100 MG tablet [Pharmacy Med Name: Losartan Potassium Oral Tablet 100 MG] 90 tablet 0     Sig: TAKE ONE TABLET BY MOUTH ONE TIME DAILY       Angiotensin-II Receptors Failed - 2/11/2023  3:06 PM        Failed - Last blood pressure under 140/90 in past 12 months " "    BP Readings from Last 3 Encounters:   01/26/23 (!) 172/86   09/15/22 (!) 160/80   11/19/21 (!) 158/74                 Passed - Recent (12 mo) or future (30 days) visit within the authorizing provider's specialty     Patient has had an office visit with the authorizing provider or a provider within the authorizing providers department within the previous 12 mos or has a future within next 30 days. See \"Patient Info\" tab in inbasket, or \"Choose Columns\" in Meds & Orders section of the refill encounter.              Passed - Medication is active on med list        Passed - Patient is age 18 or older        Passed - No active pregnancy on record        Passed - Normal serum creatinine on file in past 12 months     Recent Labs   Lab Test 09/15/22  1217   CR 0.73       Ok to refill medication if creatinine is low          Passed - Normal serum potassium on file in past 12 months     Recent Labs   Lab Test 09/15/22  1217   POTASSIUM 4.4                    Passed - No positive pregnancy test in past 12 months             Christy Kong, RN 02/13/23 11:52 AM  "

## 2023-02-28 ENCOUNTER — HOSPITAL ENCOUNTER (OUTPATIENT)
Dept: RADIOLOGY | Facility: HOSPITAL | Age: 88
Discharge: HOME | End: 2023-02-28
Attending: INTERNAL MEDICINE | Admitting: INTERNAL MEDICINE
Payer: COMMERCIAL

## 2023-02-28 DIAGNOSIS — K21.00 GASTROESOPHAGEAL REFLUX DISEASE WITH ESOPHAGITIS WITHOUT HEMORRHAGE: ICD-10-CM

## 2023-02-28 DIAGNOSIS — R13.19 ESOPHAGEAL DYSPHAGIA: ICD-10-CM

## 2023-02-28 PROCEDURE — 255N000001 HC RX 255: Performed by: INTERNAL MEDICINE

## 2023-02-28 PROCEDURE — 74221 X-RAY XM ESOPHAGUS 2CNTRST: CPT

## 2023-02-28 RX ADMIN — ANTACID/ANTIFLATULENT 4 G: 380; 550; 10; 10 GRANULE, EFFERVESCENT ORAL at 09:24

## 2023-03-01 ENCOUNTER — TELEPHONE (OUTPATIENT)
Dept: INTERNAL MEDICINE | Facility: CLINIC | Age: 88
End: 2023-03-01
Payer: COMMERCIAL

## 2023-03-01 DIAGNOSIS — K21.00 GASTROESOPHAGEAL REFLUX DISEASE WITH ESOPHAGITIS WITHOUT HEMORRHAGE: Primary | ICD-10-CM

## 2023-03-01 DIAGNOSIS — I10 ESSENTIAL HYPERTENSION: ICD-10-CM

## 2023-03-01 DIAGNOSIS — R13.19 ESOPHAGEAL DYSPHAGIA: ICD-10-CM

## 2023-03-01 RX ORDER — METOPROLOL SUCCINATE 25 MG/1
TABLET, EXTENDED RELEASE ORAL
Qty: 90 TABLET | Refills: 0 | OUTPATIENT
Start: 2023-03-01

## 2023-03-01 NOTE — TELEPHONE ENCOUNTER
Outpatient Medication Detail     Disp Refills Start End YARED   metoprolol succinate ER (TOPROL XL) 25 MG 24 hr tablet (Discontinued) 90 tablet 0 12/1/2022 1/26/2023 No   Sig: TAKE ONE TABLET BY MOUTH ONE TIME DAILY   Patient not taking: Reported on 1/26/2023        Sent to pharmacy as: Metoprolol Succinate ER 25 MG Oral Tablet Extended Release 24 Hour (TOPROL XL)   Class: E-Prescribe   Reason for Discontinue

## 2023-03-01 NOTE — CONFIDENTIAL NOTE
Upper GI ordered by Dr. Marquez shows scar tissue at the lower esophagus called a Schatzki's ring.  No evidence of ulcer or cancer    This type of lesion can be dilated open during an upper GI endoscopy.  I have placed that order and someone will contact her to arrange    Please let her know

## 2023-04-03 ENCOUNTER — OFFICE VISIT (OUTPATIENT)
Dept: INTERNAL MEDICINE | Facility: CLINIC | Age: 88
End: 2023-04-03
Payer: COMMERCIAL

## 2023-04-03 VITALS
BODY MASS INDEX: 40.65 KG/M2 | TEMPERATURE: 97.9 F | HEART RATE: 82 BPM | WEIGHT: 215.3 LBS | HEIGHT: 61 IN | RESPIRATION RATE: 17 BRPM | OXYGEN SATURATION: 97 % | SYSTOLIC BLOOD PRESSURE: 118 MMHG | DIASTOLIC BLOOD PRESSURE: 64 MMHG

## 2023-04-03 DIAGNOSIS — I10 ESSENTIAL HYPERTENSION WITH GOAL BLOOD PRESSURE LESS THAN 140/90: ICD-10-CM

## 2023-04-03 DIAGNOSIS — E11.40 TYPE 2 DIABETES MELLITUS WITH DIABETIC NEUROPATHY, WITHOUT LONG-TERM CURRENT USE OF INSULIN (H): ICD-10-CM

## 2023-04-03 DIAGNOSIS — M26.609 TMJ (TEMPOROMANDIBULAR JOINT SYNDROME): ICD-10-CM

## 2023-04-03 DIAGNOSIS — K21.00 GASTROESOPHAGEAL REFLUX DISEASE WITH ESOPHAGITIS WITHOUT HEMORRHAGE: ICD-10-CM

## 2023-04-03 DIAGNOSIS — K22.2 SCHATZKI'S RING: Primary | ICD-10-CM

## 2023-04-03 LAB
ANION GAP SERPL CALCULATED.3IONS-SCNC: 14 MMOL/L (ref 7–15)
BUN SERPL-MCNC: 15.5 MG/DL (ref 8–23)
CALCIUM SERPL-MCNC: 9.6 MG/DL (ref 8.8–10.2)
CHLORIDE SERPL-SCNC: 99 MMOL/L (ref 98–107)
CREAT SERPL-MCNC: 0.81 MG/DL (ref 0.51–0.95)
DEPRECATED HCO3 PLAS-SCNC: 26 MMOL/L (ref 22–29)
GFR SERPL CREATININE-BSD FRML MDRD: 70 ML/MIN/1.73M2
GLUCOSE SERPL-MCNC: 131 MG/DL (ref 70–99)
HBA1C MFR BLD: 7 % (ref 0–5.6)
POTASSIUM SERPL-SCNC: 4.6 MMOL/L (ref 3.4–5.3)
SODIUM SERPL-SCNC: 139 MMOL/L (ref 136–145)

## 2023-04-03 PROCEDURE — 99214 OFFICE O/P EST MOD 30 MIN: CPT | Performed by: INTERNAL MEDICINE

## 2023-04-03 PROCEDURE — 36415 COLL VENOUS BLD VENIPUNCTURE: CPT | Performed by: INTERNAL MEDICINE

## 2023-04-03 PROCEDURE — 83036 HEMOGLOBIN GLYCOSYLATED A1C: CPT | Performed by: INTERNAL MEDICINE

## 2023-04-03 PROCEDURE — 80048 BASIC METABOLIC PNL TOTAL CA: CPT | Performed by: INTERNAL MEDICINE

## 2023-04-03 ASSESSMENT — PATIENT HEALTH QUESTIONNAIRE - PHQ9
SUM OF ALL RESPONSES TO PHQ QUESTIONS 1-9: 2
10. IF YOU CHECKED OFF ANY PROBLEMS, HOW DIFFICULT HAVE THESE PROBLEMS MADE IT FOR YOU TO DO YOUR WORK, TAKE CARE OF THINGS AT HOME, OR GET ALONG WITH OTHER PEOPLE: NOT DIFFICULT AT ALL
SUM OF ALL RESPONSES TO PHQ QUESTIONS 1-9: 2

## 2023-04-03 NOTE — PROGRESS NOTES
1. Schatzki's ring  We reviewed her upper GI series which shows a Schatzki ring.  She says the significant improvement of her reflux symptoms and dysphagia with omeprazole.  Given the severity of the Schatzki ring on the esophagram I do recommend dilatation as I fear she might be at risk of esophageal impaction  - Adult GI  Referral - Procedure Only; Future    2. Gastroesophageal reflux disease with esophagitis without hemorrhage  Continue with omeprazole    3. TMJ (temporomandibular joint syndrome)  Quite tender over the TMJ joint worse with opening her jaw.  Discussed avoidance of extreme pressures on the jaw, neutral jaw position and use of Voltaren gel.  I recommend she see a TMJ specialist and referral has been placed  - Adult ENT  Referral; Future    4. Type 2 diabetes mellitus with diabetic neuropathy, without long-term current use of insulin (H)  I would recommend she come in for an annual wellness visit.  I be happy to see her if she prefers more in person visits.  She can schedule at her convenience  - Hemoglobin A1c; Future  - Basic metabolic panel  (Ca, Cl, CO2, Creat, Gluc, K, Na, BUN); Future  - Hemoglobin A1c  - Basic metabolic panel  (Ca, Cl, CO2, Creat, Gluc, K, Na, BUN)    5. Essential hypertension with goal blood pressure less than 140/90  Blood pressure is better with the addition of amlodipine a little bit of swelling but not much discussed leg elevation    Lizy Pimentel is a 87 year old, presenting for the following health issues:  Recheck Medication and RECHECK        11/19/2021     3:54 PM   Additional Questions   Roomed by MB   Accompanied by  Dtr - Loren     History of Present Illness       Hypertension: She presents for follow up of hypertension.  She does check blood pressure  regularly outside of the clinic. Outside blood pressures have been over 140/90. She follows a low salt diet.     Headaches:   Since the patient's last clinic visit, headaches are:  "worsened  The patient is getting headaches:  Intermittent  She is able to do normal daily activities when she has a migraine.  The patient is taking the following rescue/relief medications:  No rescue/relief medications   Patient states \"I get no relief\" from the rescue/relief medications.   The patient is taking the following medications to prevent migraines:  No medications to prevent migraines  In the past 4 weeks, the patient has gone to an Urgent Care or Emergency Room 0 times times due to headaches.    Reason for visit:  Ear pain and bp    She eats 4 or more servings of fruits and vegetables daily.She consumes 1 sweetened beverage(s) daily.She exercises with enough effort to increase her heart rate 30 to 60 minutes per day.  She exercises with enough effort to increase her heart rate 4 days per week.   She is taking medications regularly.    Today's PHQ-9         PHQ-9 Total Score: 2    PHQ-9 Q9 Thoughts of better off dead/self-harm past 2 weeks :   Not at all    How difficult have these problems made it for you to do your work, take care of things at home, or get along with other people: Not difficult at all               Review of Systems         Objective    There were no vitals taken for this visit.  There is no height or weight on file to calculate BMI.  Physical Exam                       "

## 2023-04-26 DIAGNOSIS — F51.01 PRIMARY INSOMNIA: ICD-10-CM

## 2023-04-26 DIAGNOSIS — F41.9 ANXIETY: ICD-10-CM

## 2023-04-27 RX ORDER — LORAZEPAM 1 MG/1
TABLET ORAL
Qty: 90 TABLET | Refills: 0 | Status: SHIPPED | OUTPATIENT
Start: 2023-04-27 | End: 2023-05-27

## 2023-05-13 ENCOUNTER — HEALTH MAINTENANCE LETTER (OUTPATIENT)
Age: 88
End: 2023-05-13

## 2023-05-27 DIAGNOSIS — F41.9 ANXIETY: ICD-10-CM

## 2023-05-27 DIAGNOSIS — I10 ESSENTIAL HYPERTENSION: ICD-10-CM

## 2023-05-27 DIAGNOSIS — F51.01 PRIMARY INSOMNIA: ICD-10-CM

## 2023-05-27 RX ORDER — LORAZEPAM 1 MG/1
TABLET ORAL
Qty: 90 TABLET | Refills: 0 | Status: SHIPPED | OUTPATIENT
Start: 2023-05-27 | End: 2023-10-23

## 2023-05-27 RX ORDER — LOSARTAN POTASSIUM 100 MG/1
TABLET ORAL
Qty: 90 TABLET | Refills: 0 | Status: SHIPPED | OUTPATIENT
Start: 2023-05-27 | End: 2023-09-01

## 2023-05-27 RX ORDER — HYDROCHLOROTHIAZIDE 25 MG/1
TABLET ORAL
Qty: 90 TABLET | Refills: 0 | Status: SHIPPED | OUTPATIENT
Start: 2023-05-27 | End: 2023-10-23

## 2023-06-30 ENCOUNTER — OFFICE VISIT (OUTPATIENT)
Dept: INTERNAL MEDICINE | Facility: CLINIC | Age: 88
End: 2023-06-30
Payer: COMMERCIAL

## 2023-06-30 ENCOUNTER — ANCILLARY PROCEDURE (OUTPATIENT)
Dept: GENERAL RADIOLOGY | Facility: CLINIC | Age: 88
End: 2023-06-30
Attending: INTERNAL MEDICINE
Payer: COMMERCIAL

## 2023-06-30 VITALS
DIASTOLIC BLOOD PRESSURE: 69 MMHG | OXYGEN SATURATION: 99 % | HEIGHT: 61 IN | HEART RATE: 78 BPM | SYSTOLIC BLOOD PRESSURE: 140 MMHG | TEMPERATURE: 98 F | BODY MASS INDEX: 41.34 KG/M2 | RESPIRATION RATE: 18 BRPM

## 2023-06-30 DIAGNOSIS — I10 ESSENTIAL HYPERTENSION WITH GOAL BLOOD PRESSURE LESS THAN 140/90: ICD-10-CM

## 2023-06-30 DIAGNOSIS — M76.61 ACHILLES TENDINITIS OF RIGHT LOWER EXTREMITY: ICD-10-CM

## 2023-06-30 DIAGNOSIS — K21.00 GASTROESOPHAGEAL REFLUX DISEASE WITH ESOPHAGITIS WITHOUT HEMORRHAGE: ICD-10-CM

## 2023-06-30 DIAGNOSIS — E11.40 TYPE 2 DIABETES MELLITUS WITH DIABETIC NEUROPATHY, WITHOUT LONG-TERM CURRENT USE OF INSULIN (H): ICD-10-CM

## 2023-06-30 DIAGNOSIS — M76.60 ACHILLES TENDON PAIN: Primary | ICD-10-CM

## 2023-06-30 DIAGNOSIS — M76.60 ACHILLES TENDON PAIN: ICD-10-CM

## 2023-06-30 DIAGNOSIS — M26.609 TMJ (TEMPOROMANDIBULAR JOINT SYNDROME): ICD-10-CM

## 2023-06-30 DIAGNOSIS — K22.2 SCHATZKI'S RING: ICD-10-CM

## 2023-06-30 PROCEDURE — 99214 OFFICE O/P EST MOD 30 MIN: CPT | Performed by: INTERNAL MEDICINE

## 2023-06-30 PROCEDURE — 73650 X-RAY EXAM OF HEEL: CPT | Mod: TC | Performed by: RADIOLOGY

## 2023-06-30 ASSESSMENT — PAIN SCALES - GENERAL: PAINLEVEL: EXTREME PAIN (8)

## 2023-06-30 NOTE — H&P (VIEW-ONLY)
Office Visit - Follow Up   Loren Marie   87 year old female    Date of Visit: 6/30/2023    Chief Complaint   Patient presents with     Musculoskeletal Problem     Right side achilles is bothering her x3 weeks. Patient bumped against chair. Patient unsure of medications when verifying.  Declined weight.        Assessment and Plan   1. Achilles tendon pain  Evaluate for any fracture, evaluate for any Achilles tendon tear, discussed referral to orthopedic foot specialist pending imaging  - XR Calcaneus Right G/E 2 Views; Future  - MR Ankle Right w/o Contrast; Future    2. Type 2 diabetes mellitus with diabetic neuropathy, without long-term current use of insulin (H)  Has been well controlled, some neuropathy of her feet which is stable    3. Essential hypertension with goal blood pressure less than 140/90  Blood pressure borderline, continue amlodipine discussed side effect of some mild swelling which is tolerable    4. Gastroesophageal reflux disease with esophagitis without hemorrhage  5. Schatzki's ring  Continue omeprazole upcoming endoscopy    6. TMJ (temporomandibular joint syndrome)  She missed her ENT visit, symptoms seem to have quieted down    Return in about 4 weeks (around 7/28/2023) for Routine preventive.     History of Present Illness   This 87 year old comes in for follow-up.  Her main concern today is that she bumped her heel on a chair a few weeks ago and it is quite painful and swollen.  It hurts to walk.  She has not noticed any weakness.  Additionally, she continues to have difficulty swallowing food will get stuck in her chest.  She does have an upcoming EGD.  Has improved with omeprazole.  She has a little bit of swelling related to amlodipine which is tolerable.  She missed an appointment with TMJ specialist but it seems like the symptoms have calm down a bit.       Physical Exam   General Appearance:   No acute distress    BP (!) 140/69   Pulse 78   Temp 98  F (36.7  C) (Tympanic)    "Resp 18   Ht 1.537 m (5' 0.51\")   LMP  (LMP Unknown)   SpO2 99%   BMI 41.34 kg/m      Swelling and pain with palpation along the insertion site of the Achilles tendon.  Normal range of motion of the ankle     Additional Information   Current Outpatient Medications   Medication Sig Dispense Refill     amLODIPine (NORVASC) 5 MG tablet Take 1 tablet (5 mg) by mouth daily 90 tablet 4     cholecalciferol, vitamin D3, (VITAMIN D3 ORAL) [CHOLECALCIFEROL, VITAMIN D3, (VITAMIN D3 ORAL)] Take 2,000 Units by mouth daily.       hydrochlorothiazide (HYDRODIURIL) 25 MG tablet TAKE ONE TABLET BY MOUTH ONE TIME DAILY 90 tablet 0     latanoprost (XALATAN) 0.005 % ophthalmic solution [LATANOPROST (XALATAN) 0.005 % OPHTHALMIC SOLUTION] Use as directed in both eyes daily 2.5 mL 6     LORazepam (ATIVAN) 1 MG tablet TAKE ONE TABLET BY MOUTH AT BEDTIME 90 tablet 0     losartan (COZAAR) 100 MG tablet TAKE ONE TABLET BY MOUTH ONE TIME DAILY 90 tablet 0     metoprolol succinate ER (TOPROL XL) 50 MG 24 hr tablet Take 1 tablet (50 mg) by mouth daily 90 tablet 3     multivitamin-minerals-lutein (CENTRUM SILVER) tablet [MULTIVITAMIN-MINERALS-LUTEIN (CENTRUM SILVER) TABLET] Take 1 tablet by mouth daily.       omeprazole (PRILOSEC) 20 MG DR capsule Take 1 capsule (20 mg) by mouth daily 90 capsule 4     pravastatin (PRAVACHOL) 20 MG tablet Take 1 tablet (20 mg) by mouth daily 90 tablet 3     zolpidem (AMBIEN) 5 MG tablet Take 1 tablet (5 mg) by mouth At Bedtime 90 tablet 0     famciclovir (FAMVIR) 500 MG tablet Take 1 tablet (500 mg) by mouth 3 times daily for 7 days 21 tablet 0       Time:      Wolf Marquez MD  Answers for HPI/ROS submitted by the patient on 6/30/2023  How many servings of fruits and vegetables do you eat daily?: 4 or more  On average, how many sweetened beverages do you drink each day (Examples: soda, juice, sweet tea, etc.  Do NOT count diet or artificially sweetened beverages)?: 1  How many minutes a day do you exercise " enough to make your heart beat faster?: 30 to 60  How many days a week do you exercise enough to make your heart beat faster?: 6  How many days per week do you miss taking your medication?: 0  What is the reason for your visit today?: achilles  When did your symptoms begin?: More than a month  What are your symptoms?: painful to work  How would you describe these symptoms?: Severe  Are your symptoms:: Worsening  Have you had these symptoms before?: No  Is there anything that makes you feel worse?: walking and touching area  Is there anything that makes you feel better?: cream sometimes

## 2023-07-11 ENCOUNTER — HOSPITAL ENCOUNTER (OUTPATIENT)
Dept: MRI IMAGING | Facility: HOSPITAL | Age: 88
Discharge: HOME | End: 2023-07-11
Attending: INTERNAL MEDICINE | Admitting: INTERNAL MEDICINE
Payer: COMMERCIAL

## 2023-07-11 DIAGNOSIS — M76.60 ACHILLES TENDON PAIN: ICD-10-CM

## 2023-07-11 PROCEDURE — 73721 MRI JNT OF LWR EXTRE W/O DYE: CPT | Mod: RT

## 2023-07-13 ENCOUNTER — HOSPITAL ENCOUNTER (OUTPATIENT)
Facility: HOSPITAL | Age: 88
Discharge: HOME | End: 2023-07-13
Attending: INTERNAL MEDICINE | Admitting: INTERNAL MEDICINE
Payer: COMMERCIAL

## 2023-07-13 VITALS
OXYGEN SATURATION: 96 % | SYSTOLIC BLOOD PRESSURE: 139 MMHG | BODY MASS INDEX: 39.84 KG/M2 | DIASTOLIC BLOOD PRESSURE: 64 MMHG | TEMPERATURE: 97.8 F | WEIGHT: 211 LBS | RESPIRATION RATE: 18 BRPM | HEART RATE: 75 BPM | HEIGHT: 61 IN

## 2023-07-13 LAB — UPPER GI ENDOSCOPY: NORMAL

## 2023-07-13 PROCEDURE — 250N000011 HC RX IP 250 OP 636: Performed by: INTERNAL MEDICINE

## 2023-07-13 PROCEDURE — 43248 EGD GUIDE WIRE INSERTION: CPT | Performed by: INTERNAL MEDICINE

## 2023-07-13 PROCEDURE — G0500 MOD SEDAT ENDO SERVICE >5YRS: HCPCS | Performed by: INTERNAL MEDICINE

## 2023-07-13 PROCEDURE — 43235 EGD DIAGNOSTIC BRUSH WASH: CPT | Performed by: INTERNAL MEDICINE

## 2023-07-13 RX ORDER — SIMETHICONE 40MG/0.6ML
133 SUSPENSION, DROPS(FINAL DOSAGE FORM)(ML) ORAL
Status: DISCONTINUED | OUTPATIENT
Start: 2023-07-13 | End: 2023-07-13 | Stop reason: HOSPADM

## 2023-07-13 RX ORDER — FENTANYL CITRATE 50 UG/ML
50-100 INJECTION, SOLUTION INTRAMUSCULAR; INTRAVENOUS EVERY 5 MIN PRN
Status: DISCONTINUED | OUTPATIENT
Start: 2023-07-13 | End: 2023-07-13 | Stop reason: HOSPADM

## 2023-07-13 RX ORDER — NALOXONE HYDROCHLORIDE 0.4 MG/ML
0.2 INJECTION, SOLUTION INTRAMUSCULAR; INTRAVENOUS; SUBCUTANEOUS
Status: DISCONTINUED | OUTPATIENT
Start: 2023-07-13 | End: 2023-07-13 | Stop reason: HOSPADM

## 2023-07-13 RX ORDER — NALOXONE HYDROCHLORIDE 0.4 MG/ML
0.4 INJECTION, SOLUTION INTRAMUSCULAR; INTRAVENOUS; SUBCUTANEOUS
Status: DISCONTINUED | OUTPATIENT
Start: 2023-07-13 | End: 2023-07-13 | Stop reason: HOSPADM

## 2023-07-13 RX ORDER — EPINEPHRINE 1 MG/ML
0.1 INJECTION, SOLUTION INTRAMUSCULAR; SUBCUTANEOUS
Status: DISCONTINUED | OUTPATIENT
Start: 2023-07-13 | End: 2023-07-13 | Stop reason: HOSPADM

## 2023-07-13 RX ORDER — FLUMAZENIL 0.1 MG/ML
0.2 INJECTION, SOLUTION INTRAVENOUS
Status: CANCELLED | OUTPATIENT
Start: 2023-07-13 | End: 2023-07-13

## 2023-07-13 RX ORDER — LIDOCAINE 40 MG/G
CREAM TOPICAL
Status: DISCONTINUED | OUTPATIENT
Start: 2023-07-13 | End: 2023-07-13 | Stop reason: HOSPADM

## 2023-07-13 RX ORDER — FENTANYL CITRATE 50 UG/ML
INJECTION, SOLUTION INTRAMUSCULAR; INTRAVENOUS PRN
Status: DISCONTINUED | OUTPATIENT
Start: 2023-07-13 | End: 2023-07-13 | Stop reason: HOSPADM

## 2023-07-13 RX ORDER — PROCHLORPERAZINE MALEATE 5 MG
5 TABLET ORAL EVERY 6 HOURS PRN
Status: CANCELLED | OUTPATIENT
Start: 2023-07-13

## 2023-07-13 RX ORDER — ONDANSETRON 4 MG/1
4 TABLET, ORALLY DISINTEGRATING ORAL EVERY 6 HOURS PRN
Status: CANCELLED | OUTPATIENT
Start: 2023-07-13

## 2023-07-13 RX ORDER — DIPHENHYDRAMINE HYDROCHLORIDE 50 MG/ML
25-50 INJECTION INTRAMUSCULAR; INTRAVENOUS
Status: DISCONTINUED | OUTPATIENT
Start: 2023-07-13 | End: 2023-07-13 | Stop reason: HOSPADM

## 2023-07-13 RX ORDER — FLUMAZENIL 0.1 MG/ML
0.2 INJECTION, SOLUTION INTRAVENOUS
Status: DISCONTINUED | OUTPATIENT
Start: 2023-07-13 | End: 2023-07-13 | Stop reason: HOSPADM

## 2023-07-13 RX ORDER — ATROPINE SULFATE 0.1 MG/ML
1 INJECTION INTRAVENOUS
Status: DISCONTINUED | OUTPATIENT
Start: 2023-07-13 | End: 2023-07-13 | Stop reason: HOSPADM

## 2023-07-13 RX ORDER — ONDANSETRON 2 MG/ML
4 INJECTION INTRAMUSCULAR; INTRAVENOUS
Status: DISCONTINUED | OUTPATIENT
Start: 2023-07-13 | End: 2023-07-13 | Stop reason: HOSPADM

## 2023-07-13 RX ORDER — ONDANSETRON 2 MG/ML
4 INJECTION INTRAMUSCULAR; INTRAVENOUS EVERY 6 HOURS PRN
Status: CANCELLED | OUTPATIENT
Start: 2023-07-13

## 2023-07-13 ASSESSMENT — ACTIVITIES OF DAILY LIVING (ADL): ADLS_ACUITY_SCORE: 35

## 2023-07-17 ENCOUNTER — OFFICE VISIT (OUTPATIENT)
Dept: INTERNAL MEDICINE | Facility: CLINIC | Age: 88
End: 2023-07-17
Payer: COMMERCIAL

## 2023-07-17 VITALS
RESPIRATION RATE: 15 BRPM | DIASTOLIC BLOOD PRESSURE: 72 MMHG | OXYGEN SATURATION: 99 % | SYSTOLIC BLOOD PRESSURE: 138 MMHG | WEIGHT: 213.7 LBS | TEMPERATURE: 97.6 F | HEIGHT: 61 IN | BODY MASS INDEX: 40.35 KG/M2 | HEART RATE: 87 BPM

## 2023-07-17 DIAGNOSIS — K21.00 GASTROESOPHAGEAL REFLUX DISEASE WITH ESOPHAGITIS WITHOUT HEMORRHAGE: ICD-10-CM

## 2023-07-17 DIAGNOSIS — M15.0 PRIMARY OSTEOARTHRITIS INVOLVING MULTIPLE JOINTS: ICD-10-CM

## 2023-07-17 DIAGNOSIS — E78.2 MIXED HYPERLIPIDEMIA: ICD-10-CM

## 2023-07-17 DIAGNOSIS — E11.40 TYPE 2 DIABETES MELLITUS WITH DIABETIC NEUROPATHY, WITHOUT LONG-TERM CURRENT USE OF INSULIN (H): ICD-10-CM

## 2023-07-17 DIAGNOSIS — M76.61 ACHILLES TENDINITIS OF RIGHT LOWER EXTREMITY: ICD-10-CM

## 2023-07-17 DIAGNOSIS — I10 ESSENTIAL HYPERTENSION WITH GOAL BLOOD PRESSURE LESS THAN 140/90: ICD-10-CM

## 2023-07-17 DIAGNOSIS — F41.9 ANXIETY: ICD-10-CM

## 2023-07-17 DIAGNOSIS — F32.5 MAJOR DEPRESSIVE DISORDER WITH SINGLE EPISODE, IN FULL REMISSION (H): ICD-10-CM

## 2023-07-17 DIAGNOSIS — Z00.00 ANNUAL PHYSICAL EXAM: Primary | ICD-10-CM

## 2023-07-17 DIAGNOSIS — F51.01 PRIMARY INSOMNIA: ICD-10-CM

## 2023-07-17 DIAGNOSIS — E66.01 MORBID OBESITY (H): ICD-10-CM

## 2023-07-17 DIAGNOSIS — H40.1134 PRIMARY OPEN ANGLE GLAUCOMA (POAG) OF BOTH EYES, INDETERMINATE STAGE: ICD-10-CM

## 2023-07-17 DIAGNOSIS — H90.3 SENSORINEURAL HEARING LOSS (SNHL) OF BOTH EARS: ICD-10-CM

## 2023-07-17 PROBLEM — Z90.49 S/P LAPAROSCOPIC CHOLECYSTECTOMY: Status: RESOLVED | Noted: 2018-12-01 | Resolved: 2023-07-17

## 2023-07-17 PROBLEM — E87.6 HYPOKALEMIA: Status: RESOLVED | Noted: 2018-12-03 | Resolved: 2023-07-17

## 2023-07-17 LAB
ALBUMIN SERPL BCG-MCNC: 4.3 G/DL (ref 3.5–5.2)
ALP SERPL-CCNC: 78 U/L (ref 35–104)
ALT SERPL W P-5'-P-CCNC: 15 U/L (ref 0–50)
ANION GAP SERPL CALCULATED.3IONS-SCNC: 10 MMOL/L (ref 7–15)
AST SERPL W P-5'-P-CCNC: 27 U/L (ref 0–45)
BILIRUB SERPL-MCNC: 0.4 MG/DL
BUN SERPL-MCNC: 13.9 MG/DL (ref 8–23)
CALCIUM SERPL-MCNC: 9.2 MG/DL (ref 8.8–10.2)
CHLORIDE SERPL-SCNC: 100 MMOL/L (ref 98–107)
CHOLEST SERPL-MCNC: 209 MG/DL
CREAT SERPL-MCNC: 0.77 MG/DL (ref 0.51–0.95)
CREAT UR-MCNC: 46 MG/DL
DEPRECATED HCO3 PLAS-SCNC: 28 MMOL/L (ref 22–29)
ERYTHROCYTE [DISTWIDTH] IN BLOOD BY AUTOMATED COUNT: 14 % (ref 10–15)
GFR SERPL CREATININE-BSD FRML MDRD: 75 ML/MIN/1.73M2
GLUCOSE SERPL-MCNC: 138 MG/DL (ref 70–99)
HBA1C MFR BLD: 6.9 % (ref 0–5.6)
HCT VFR BLD AUTO: 40.3 % (ref 35–47)
HDLC SERPL-MCNC: 66 MG/DL
HGB BLD-MCNC: 12.7 G/DL (ref 11.7–15.7)
LDLC SERPL CALC-MCNC: 128 MG/DL
MCH RBC QN AUTO: 26.3 PG (ref 26.5–33)
MCHC RBC AUTO-ENTMCNC: 31.5 G/DL (ref 31.5–36.5)
MCV RBC AUTO: 83 FL (ref 78–100)
MICROALBUMIN UR-MCNC: <12 MG/L
MICROALBUMIN/CREAT UR: NORMAL MG/G{CREAT}
NONHDLC SERPL-MCNC: 143 MG/DL
PLATELET # BLD AUTO: 291 10E3/UL (ref 150–450)
POTASSIUM SERPL-SCNC: 4.3 MMOL/L (ref 3.4–5.3)
PROT SERPL-MCNC: 7.5 G/DL (ref 6.4–8.3)
RBC # BLD AUTO: 4.83 10E6/UL (ref 3.8–5.2)
SODIUM SERPL-SCNC: 138 MMOL/L (ref 136–145)
TRIGL SERPL-MCNC: 74 MG/DL
WBC # BLD AUTO: 4.4 10E3/UL (ref 4–11)

## 2023-07-17 PROCEDURE — 85027 COMPLETE CBC AUTOMATED: CPT | Performed by: INTERNAL MEDICINE

## 2023-07-17 PROCEDURE — 36415 COLL VENOUS BLD VENIPUNCTURE: CPT | Performed by: INTERNAL MEDICINE

## 2023-07-17 PROCEDURE — 80053 COMPREHEN METABOLIC PANEL: CPT | Performed by: INTERNAL MEDICINE

## 2023-07-17 PROCEDURE — 80061 LIPID PANEL: CPT | Performed by: INTERNAL MEDICINE

## 2023-07-17 PROCEDURE — 82570 ASSAY OF URINE CREATININE: CPT | Performed by: INTERNAL MEDICINE

## 2023-07-17 PROCEDURE — 83036 HEMOGLOBIN GLYCOSYLATED A1C: CPT | Performed by: INTERNAL MEDICINE

## 2023-07-17 PROCEDURE — 82043 UR ALBUMIN QUANTITATIVE: CPT | Performed by: INTERNAL MEDICINE

## 2023-07-17 PROCEDURE — G0439 PPPS, SUBSEQ VISIT: HCPCS | Performed by: INTERNAL MEDICINE

## 2023-07-17 PROCEDURE — 99214 OFFICE O/P EST MOD 30 MIN: CPT | Mod: 25 | Performed by: INTERNAL MEDICINE

## 2023-07-17 RX ORDER — LATANOPROST 50 UG/ML
SOLUTION/ DROPS OPHTHALMIC
Qty: 2.5 ML | Refills: 6 | Status: CANCELLED | OUTPATIENT
Start: 2023-07-17

## 2023-07-17 ASSESSMENT — ENCOUNTER SYMPTOMS
ARTHRALGIAS: 0
NAUSEA: 0
FREQUENCY: 0
NERVOUS/ANXIOUS: 0
HEMATURIA: 0
PARESTHESIAS: 0
HEADACHES: 0
JOINT SWELLING: 0
BREAST MASS: 0
PALPITATIONS: 0
EYE PAIN: 0
COUGH: 0
DYSURIA: 0
WEAKNESS: 0
FEVER: 0
MYALGIAS: 0
CONSTIPATION: 0
DIARRHEA: 0
SORE THROAT: 0
ABDOMINAL PAIN: 0
CHILLS: 0
HEARTBURN: 0
HEMATOCHEZIA: 0
DIZZINESS: 0
SHORTNESS OF BREATH: 0

## 2023-07-17 ASSESSMENT — PAIN SCALES - GENERAL: PAINLEVEL: MILD PAIN (3)

## 2023-07-17 ASSESSMENT — ACTIVITIES OF DAILY LIVING (ADL): CURRENT_FUNCTION: NO ASSISTANCE NEEDED

## 2023-07-17 NOTE — PROGRESS NOTES
"SUBJECTIVE:   Loren is a 87 year old who presents for Preventive Visit.      7/17/2023    11:50 AM   Additional Questions   Roomed by chapo rae     Are you in the first 12 months of your Medicare coverage?  No    Healthy Habits:     In general, how would you rate your overall health?  Good    Frequency of exercise:  2-3 days/week    Duration of exercise:  Other    Do you usually eat at least 4 servings of fruit and vegetables a day, include whole grains    & fiber and avoid regularly eating high fat or \"junk\" foods?  Yes    Taking medications regularly:  Yes    Medication side effects:  None    Ability to successfully perform activities of daily living:  No assistance needed    Home Safety:  No safety concerns identified    Hearing Impairment:  Need to ask people to speak up or repeat themselves    In the past 6 months, have you been bothered by leaking of urine?  No    In general, how would you rate your overall mental or emotional health?  Good    Additional concerns today:  No        Have you ever done Advance Care Planning? (For example, a Health Directive, POLST, or a discussion with a medical provider or your loved ones about your wishes): Yes, patient states has an Advance Care Planning document and will bring a copy to the clinic.       Fall risk  Fallen 2 or more times in the past year?: No  Any fall with injury in the past year?: No    Cognitive Screening   1) Repeat 3 items (Leader, Season, Table)    2) Clock draw: ABNORMAL .  3) 3 item recall: Recalls 3 objects  Results: Slightly abnormal clock but normal 3 word recall, significant dementia unlikely    Mini-CogTM Copyright ABNER Davalos. Licensed by the author for use in [facility]; reprinted with permission ([email or web address]). All rights reserved.      Do you have sleep apnea, excessive snoring or daytime drowsiness?: no    Reviewed and updated as needed this visit by clinical staff   Tobacco  Allergies  Meds              Reviewed and updated as " needed this visit by Provider                 Social History     Tobacco Use     Smoking status: Never     Smokeless tobacco: Never   Substance Use Topics     Alcohol use: No           7/17/2023    11:46 AM   Alcohol Use   Prescreen: >3 drinks/day or >7 drinks/week? No     Do you have a current opioid prescription? No  Do you use any other controlled substances or medications that are not prescribed by a provider? None    Current providers sharing in care for this patient include:   Patient Care Team:  Wolf Marquez MD as PCP - General (Internal Medicine)  Wolf Marquez MD as Assigned PCP    The following health maintenance items are reviewed in Epic and correct as of today:  Health Maintenance   Topic Date Due     DIABETIC FOOT EXAM  Never done     DEPRESSION ACTION PLAN  Never done     ZOSTER IMMUNIZATION (2 of 3) 01/15/2008     EYE EXAM  07/13/2019     MEDICARE ANNUAL WELLNESS VISIT  11/19/2022     MICROALBUMIN  11/19/2022     ANNUAL REVIEW OF HM ORDERS  11/19/2022     COVID-19 Vaccine (5 - Moderna series) 03/11/2023     INFLUENZA VACCINE (1) 09/01/2023     LIPID  09/15/2023     A1C  10/03/2023     PHQ-9  01/17/2024     BMP  04/03/2024     DTAP/TDAP/TD IMMUNIZATION (3 - Td or Tdap) 06/06/2024     FALL RISK ASSESSMENT  07/17/2024     COLORECTAL CANCER SCREENING  08/11/2025     ADVANCE CARE PLANNING  11/19/2026     Pneumococcal Vaccine: 65+ Years  Completed     IPV IMMUNIZATION  Aged Out     MENINGITIS IMMUNIZATION  Aged Out       Pertinent mammograms are reviewed under the imaging tab.    Review of Systems   Constitutional: Negative for chills and fever.   HENT: Negative for congestion, ear pain, hearing loss and sore throat.    Eyes: Negative for pain and visual disturbance.   Respiratory: Negative for cough and shortness of breath.    Cardiovascular: Negative for chest pain, palpitations and peripheral edema.   Gastrointestinal: Negative for abdominal pain, constipation, diarrhea, heartburn, hematochezia  "and nausea.   Breasts:  Negative for tenderness, breast mass and discharge.   Genitourinary: Negative for dysuria, frequency, genital sores, hematuria, pelvic pain, urgency, vaginal bleeding and vaginal discharge.   Musculoskeletal: Negative for arthralgias, joint swelling and myalgias.   Skin: Negative for rash.   Neurological: Negative for dizziness, weakness, headaches and paresthesias.   Psychiatric/Behavioral: Negative for mood changes. The patient is not nervous/anxious.        OBJECTIVE:   /72 (BP Location: Left arm, Patient Position: Sitting, Cuff Size: Adult Large)   Pulse 87   Temp 97.6  F (36.4  C)   Resp 15   Ht 1.549 m (5' 1\")   Wt 96.9 kg (213 lb 11.2 oz)   LMP  (LMP Unknown)   SpO2 99%   Breastfeeding No   BMI 40.38 kg/m   Estimated body mass index is 40.38 kg/m  as calculated from the following:    Height as of this encounter: 1.549 m (5' 1\").    Weight as of this encounter: 96.9 kg (213 lb 11.2 oz).  Physical Exam  EYES: Eyelids, conjunctiva, and sclera were normal. Pupils were normal. Cornea, iris, and lens were normal bilaterally.  HEAD, EARS, NOSE, MOUTH, AND THROAT: Head and face were normal. Hearing was normal to voice and the ears were normal to external exam. Nose appearance was normal and there was no discharge. Oropharynx was normal.  NECK: Neck appearance was normal. There were no neck masses and the thyroid was not enlarged.  RESPIRATORY: Breathing pattern was normal and the chest moved symmetrically.  Percussion/auscultatory percussion was normal.  Lung sounds were normal and there were no abnormal sounds.  CARDIOVASCULAR: Heart rate and rhythm were normal.  S1 and S2 were normal and there were no extra sounds or murmurs. Peripheral pulses in arms and legs were normal.  Jugular venous pressure was normal.  There was no peripheral edema.  GASTROINTESTINAL: The abdomen was normal in contour.  MUSCULOSKELETAL: Skeletal configuration was normal and muscle mass was normal for " age. Joint appearance was overall normal with exception of swelling of the right Achilles tendon  LYMPHATIC: There were no enlarged nodes.  SKIN/HAIR/NAILS: Skin color was normal.  There were no skin lesions.  Hair and nails were normal.  NEUROLOGIC: The patient was alert and oriented to person, place, time, and circumstance. Speech was normal. Cranial nerves were normal. Motor strength was normal for age. The patient was normally coordinated.  PSYCHIATRIC:  Mood and affect were normal and the patient had normal recent and remote memory. The patient's judgment and insight were normal.    ASSESSMENT / PLAN:   1. Annual physical exam  This is an 87-year-old woman with issues as discussed below    2. Type 2 diabetes mellitus with diabetic neuropathy, without long-term current use of insulin (H)  Has been well controlled continue same  - Hemoglobin A1c; Future  - Hemoglobin A1c    3. Essential hypertension with goal blood pressure less than 140/90  Blood pressure okay continue same    4. Mixed hyperlipidemia  Continue statin  - CBC with platelets; Future  - Comprehensive metabolic panel; Future  - Lipid panel reflex to direct LDL Fasting; Future  - Albumin Random Urine Quantitative with Creat Ratio; Future  - CBC with platelets  - Comprehensive metabolic panel  - Lipid panel reflex to direct LDL Fasting  - Albumin Random Urine Quantitative with Creat Ratio    5. Gastroesophageal reflux disease with esophagitis without hemorrhage  Continue reflux medications, recent endoscopy reviewed, Schatzki ring dilated    6. Sensorineural hearing loss (SNHL) of both ears  Does not wear hearing aids and is not interested in an evaluation    7. Glaucoma - Dr. Black  Continue drops    8. Primary osteoarthritis involving multiple joints  Stable    9. Achilles tendinitis of right lower extremity  Will be seeing podiatry    10. Major depressive disorder with single episode, in full remission (H)  11. Anxiety  12. Primary  "insomnia  Reviewed medications, long-term use of benzodiazepines started long before I met her.  I do not want her to concurrently use zolpidem and therefore this prescription is discontinued.    13. Morbid obesity (H)  COUNSELING:  Reviewed preventive health counseling, as reflected in patient instructions  BMI:   Estimated body mass index is 40.38 kg/m  as calculated from the following:    Height as of this encounter: 1.549 m (5' 1\").    Weight as of this encounter: 96.9 kg (213 lb 11.2 oz).   Weight management plan: Discussed healthy diet and exercise guidelines      She reports that she has never smoked. She has never used smokeless tobacco.      Appropriate preventive services were discussed with this patient, including applicable screening as appropriate for cardiovascular disease, diabetes, osteopenia/osteoporosis, and glaucoma.  As appropriate for age/gender, discussed screening for colorectal cancer, prostate cancer, breast cancer, and cervical cancer. Checklist reviewing preventive services available has been given to the patient.    Reviewed patients plan of care and provided an AVS. The Basic Care Plan (routine screening as documented in Health Maintenance) for Loren meets the Care Plan requirement. This Care Plan has been established and reviewed with the Patient.    Wolf Marquez MD  [facility]    Identified Health Risks:    I have reviewed Opioid Use Disorder and Substance Use Disorder risk factors and made any needed referrals.     Answers for HPI/ROS submitted by the patient on 7/17/2023  If you checked off any problems, how difficult have these problems made it for you to do your work, take care of things at home, or get along with other people?: Not difficult at all  PHQ9 TOTAL SCORE: 0      "

## 2023-07-18 ENCOUNTER — OFFICE VISIT (OUTPATIENT)
Dept: PODIATRY | Facility: CLINIC | Age: 88
End: 2023-07-18
Payer: COMMERCIAL

## 2023-07-18 VITALS — BODY MASS INDEX: 40.25 KG/M2 | DIASTOLIC BLOOD PRESSURE: 80 MMHG | WEIGHT: 213 LBS | SYSTOLIC BLOOD PRESSURE: 142 MMHG

## 2023-07-18 DIAGNOSIS — E11.40 TYPE 2 DIABETES MELLITUS WITH DIABETIC NEUROPATHY, WITHOUT LONG-TERM CURRENT USE OF INSULIN (H): ICD-10-CM

## 2023-07-18 DIAGNOSIS — M76.61 ACHILLES TENDINITIS OF RIGHT LOWER EXTREMITY: ICD-10-CM

## 2023-07-18 DIAGNOSIS — M79.671 RIGHT FOOT PAIN: Primary | ICD-10-CM

## 2023-07-18 PROCEDURE — 99204 OFFICE O/P NEW MOD 45 MIN: CPT | Performed by: PODIATRIST

## 2023-07-18 RX ORDER — DEXAMETHASONE SODIUM PHOSPHATE 4 MG/ML
4 INJECTION, SOLUTION INTRA-ARTICULAR; INTRALESIONAL; INTRAMUSCULAR; INTRAVENOUS; SOFT TISSUE SEE ADMIN INSTRUCTIONS
Qty: 30 ML | Refills: 0 | Status: SHIPPED | OUTPATIENT
Start: 2023-07-18 | End: 2024-02-12

## 2023-07-18 RX ORDER — METHYLPREDNISOLONE 4 MG
TABLET, DOSE PACK ORAL
Qty: 21 TABLET | Refills: 0 | Status: SHIPPED | OUTPATIENT
Start: 2023-07-18 | End: 2024-02-12

## 2023-07-18 NOTE — PATIENT INSTRUCTIONS
Thank you for choosing [facility] / [facility]!    DR LEIGH[facility]S CLINIC:  [facility]   [id number]   [place] [address]      TRIAGE LINE: [phone]  APPOINTMENTS: [phone]  RADIOLOGY: [phone]  SET UP SURGERY: [phone]  PHYSICAL THERAPY: [phone]   FAX NUMBER: [phone]  BILLING QUESTIONS: [phone]       Follow up: 5 weeks    TENDONITIS   Tendons are the strong fibrous portions of muscles that attach to bones and allow the muscle to move a joint when it contracts. Tendons are very strong because they have a lot of force exerted on them. Sometimes tendons can become painful because they have suffered an acute injury, in which too much force was exerted at one time, or an overuse injury, in which a normal force was exerted too frequently or over a prolonged period of time. As a result, there is damage to the tendon and its surrounding soft tissue structures and they become inflammed. Because tendons do not have a great blood supply, they do not heal rapidly and the inflammation can become chronic.   Conservative treatment for tendinitis involves rest and anti-inflammatory measures. Ice is applied 15 minutes 2-3 times daily. Anti-inflammatory medications called NSAIDs (ibuprofen, example) can be taken provided they are used with caution, as they can lead to internal bleeding and increase the risk ofstroke and heart attack. Sometimes topical nitroglycerin is prescribed to help with pain. Often your doctor will use a special shoe or removable walking cast to immobilize the tendon, allowing it to heal without further damage from use. These devices are very useful in helping tendons heal, but they may slow you down or make you feel like your hip, knee, or back are out ofalignment. This is temporary and should go away once you are out ofthe immobilization. You should not use a walking cast when showering or driving. Another option is Platelet  Rich Plasma injections. (Normally done with a Sports and Orthorapedic doctor.   If conservative measures fail, your physician may need to surgically repair the tendon by removing any chronic inflammatory tissue and sewing it back together. Sometimes it is sewn to an adjacent tendon with similar function for support and sometimes it is lengthened. . Sometimes the bones around the tendon need to be realigned or reshaped to better support the tendon or prevent further damage. Your foot and ankle surgeon will discuss the specifics of your surgery with you, should you need it.    Towel stretch: Sit on a hard surface with your injured leg stretched out in front of you. Loop a towel around your toes and the ball of your foot and pull the towel toward your body keeping your leg straight. Hold this position for 15 to 30 seconds and then relax. Repeat 3 times. Then push the towel away with the ball of your foot. Repeat 3 times.  When you don't feel much of a stretch using the towel, you can start the standing calf stretch and the following exercises.  Standing calf stretch: Stand facing a wall with your hands on the wall at about eye level. Keep your injured leg back with your heel on the floor. Keep the other leg forward with the knee bent. Turn your back foot slightly inward (as if you were pigeon-toed). Slowly lean into the wall until you feel a stretch in the back of your calf. Hold the stretch for 15 to 30 seconds. Return to the starting position. Repeat 3 times. Do this exercise several times each day.   Standing soleus stretch: Stand facing a wall with your hands on the wall at about chest height. Keep your injured leg back with your heel on the floor. Keep the other leg forward with the knee bent. Turn your back foot slightly inward (as if you were pigeon-toed). Bend your back knee slightly and gently lean into the wall until you feel a stretch in the lower calf of your injured leg. Hold the stretch for 15 to 30  seconds. Return to the starting position. Repeat 3 times.   Achilles stretch: Stand with the ball of one foot on a stair. Reach for the step below with your heel until you feel a stretch in the arch of your foot. Hold this position for 15 to 30 seconds and then relax. Repeat 3 times.   Heel raise: Balance yourself while standing behind a chair or counter. Using the chair or counter as a support to help you, raise your body up onto your toes and hold for 5 seconds. Then slowly lower yourself down without holding onto the support. (It's OK to keep holding onto the support if you need to.) When this exercise becomes less painful, try lowering yourself down on the injured leg only. Repeat 15 times. Do 2 sets of 15. Rest 30 seconds between sets.   Step-up: Stand with the foot of your injured leg on a support 3 to 5 inches high (like a small step or block of wood). Keep your other foot flat on the floor. Shift your weight onto the injured leg on the support. Straighten your injured leg as the other leg comes off the floor. Return to the starting position by bending your injured leg and slowly lowering your uninjured leg back to the floor. Do 2 sets of 15.   Resisted ankle eversion: Sit with both legs stretched out in front of you, with your feet about a shoulder's width apart. Tie a loop in one end of elastic tubing. Put the foot of your injured leg through the loop so that the tubing goes around the arch of that foot and wraps around the outside of the other foot. Hold onto the other end of the tubing with your hand to provide tension. Turn the foot of your injured leg up and out. Make sure you keep your other foot still so that it will allow the tubing to stretch as you move the foot of your injured leg. Return to the starting position. Do 2 sets of 15.   Balance and reach exercises: Stand next to a chair with your injured leg farther from the chair. The chair will provide support if you need it. Stand on the foot of  your injured leg and bend your knee slightly. Try to raise the arch of this foot while keeping your big toe on the floor. Keep your foot in this position. With the hand that is farther away from the chair, reach forward in front of you by bending at the waist. Avoid bending your knee any more as you do this. Repeat this 10 times. To make the exercise more challenging, reach farther in front of you. Do 2 sets of 10.  the same position as above. While keeping your arch height, reach the hand that is farther away from the chair across your body toward the chair. The farther you reach, the more challenging the exercise. Do 2 sets of 10.   Resisted ankle eversion: Sit with both legs stretched out in front of you, with your feet about a shoulder's width apart. Tie a loop in one end of elastic tubing. Put the foot of your injured leg through the loop so that the tubing goes around the arch of that foot and wraps around the outside of the other foot. Hold onto the other end of the tubing with your hand to provide tension. Turn the foot of your injured leg up and out. Make sure you keep your other foot still so that it will allow the tubing to stretch as you move the foot of your injured leg. Return to the starting position. Do 2 sets of 15.   If you have access to a wobble board, do the following exercises:  Wobble board exercises:   Stand on a wobble board with your feet shoulder width apart. Rock the board forwards and backwards 30 times, then side to side 30 times. Hold on to a chair if you need support.   Rotate the wobble board around so that the edge of the board is in contact with the floor at all times. Do this 30 times in a clockwise and then a counterclockwise direction.   Balance on the wobble board for as long as you can without letting the edges touch the floor. Try to do this for 2 minutes without touching the floor.   Rotate the wobble board in clockwise and counterclockwise circles, but do not let  the edge of the board touch the floor.   When you have mastered exercises A through D, try repeating them while standing on just your injured leg.   After you are able to do these exercises on one leg, try to do them with your eyes closed. Make sure you have something nearby to support you in case you lose your balance.   IONTOPHORESIS  You have been prescribed iontophoresis therapy with physical therapy. Iontophoresis (a.k.a. Electromotive Drug Administration (EMDA)) is a technique using a small electric charge to deliver a medicine or other chemical through the skin. It is basically an injection without the needle. It is used by physical therapists to treat a variety of conditions. It is a type of electrical stimulation that is used to administer medication into your body through your skin. There are many different uses for iontophoresis. These include, but are not limited to:  Decrease inflammation, Decrease pain, Decrease muscle spasm, Decrease swelling and edema, Reduce calcium deposits in the body, Manage scar tissue, ect....    A typical iontophoresis treatment takes 10 to 20 minutes.    Before scheduling with physical therapy or picking up the medication, please check with your insurance to understand your coverage of this service by physical therapy. You can still have this treatment if not covered by insurance, physical therapy will have you sign a waiver that you wish to proceed with treatment.  When checking with your insurance about coverage, the billing code (CPT code) they will need to check is:  82781.    Please fill prescription at a [facility] if you have difficulty getting the medication at another pharmacy.    Different Medications:    1.  Dexamethasone Sodium Phosphate, 4mg/ml injectable, 30 cc total volume.  2.  Diclofenac Sodium 1.5 % Soln, 30cc  3.  Ketoprofen Sodium 10 - 30%  4.  Naproxen Sodium 4 8 %

## 2023-07-18 NOTE — PROGRESS NOTES
PATIENT HISTORY:  Dr. Marquez requested I see this patient for their foot issue.  Loren Marie is a 87 year old female who presents to clinic for Achilles tendinitis right foot.  Pain to the right heel.  Has had this for 40 days.  Has a throbbing pain.  Pain is 9 out of 10.  Notes it is all day and with all movements.  She has had an MRI done.  Here with her daughter.  Denies specific injury.  Wondering what can be done for the pain.    Review of Systems:  Patient denies fever, chills, rash, wound, stiffness, numbness, weakness, heart burn, blood in stool, chest pain with activity, calf pain when walking, shortness of breath with activity, chronic cough, easy bleeding/bruising, swelling of ankles, excessive thirst, fatigue, depression, anxiety.  Patient admits to limping.     PAST MEDICAL HISTORY:   Past Medical History:   Diagnosis Date     HLD (hyperlipidemia)      HTN (hypertension)         PAST SURGICAL HISTORY:   Past Surgical History:   Procedure Laterality Date     ESOPHAGOSCOPY, GASTROSCOPY, DUODENOSCOPY (EGD), COMBINED N/A 07/13/2023    Procedure: ESOPHAGOGASTRODUODENOSCOPY WITH DILATION;  Surgeon: Meño Martínez MD;  Location: [facility] KNEE SCOPE, DIAGNOSTIC      Description: Arthroscopy Knee Left;  Recorded: 02/11/2008;      KNEE SCOPE, DIAGNOSTIC      Description: Arthroscopy Knee Right;  Recorded: 02/11/2008;      REDUCTION OF LARGE BREAST      Description: Breast Surgery Reduction Procedure Bilateral;  Recorded: 11/06/2008;     LAPAROSCOPIC CHOLECYSTECTOMY N/A 12/01/2018    Procedure: CHOLECYSTECTOMY, LAPAROSCOPIC- with cholangiograms;  Surgeon: Breezy Sykes DO;  Location: [facility];  Service: General     OOPHORECTOMY  1969    left, still has right ovary     ZZC TOTAL ABDOM HYSTERECTOMY      Description: Hysterectomy;  Recorded: 04/11/2011;  Comments: at 32 years of age        MEDICATIONS:   Current Outpatient Medications:      amLODIPine (NORVASC) 5 MG tablet, Take 1  tablet (5 mg) by mouth daily, Disp: 90 tablet, Rfl: 4     cholecalciferol, vitamin D3, (VITAMIN D3 ORAL), [CHOLECALCIFEROL, VITAMIN D3, (VITAMIN D3 ORAL)] Take 2,000 Units by mouth daily., Disp: , Rfl:      hydrochlorothiazide (HYDRODIURIL) 25 MG tablet, TAKE ONE TABLET BY MOUTH ONE TIME DAILY, Disp: 90 tablet, Rfl: 0     latanoprost (XALATAN) 0.005 % ophthalmic solution, [LATANOPROST (XALATAN) 0.005 % OPHTHALMIC SOLUTION] Use as directed in both eyes daily, Disp: 2.5 mL, Rfl: 6     LORazepam (ATIVAN) 1 MG tablet, TAKE ONE TABLET BY MOUTH AT BEDTIME, Disp: 90 tablet, Rfl: 0     losartan (COZAAR) 100 MG tablet, TAKE ONE TABLET BY MOUTH ONE TIME DAILY, Disp: 90 tablet, Rfl: 0     metoprolol succinate ER (TOPROL XL) 50 MG 24 hr tablet, Take 1 tablet (50 mg) by mouth daily, Disp: 90 tablet, Rfl: 3     multivitamin-minerals-lutein (CENTRUM SILVER) tablet, [MULTIVITAMIN-MINERALS-LUTEIN (CENTRUM SILVER) TABLET] Take 1 tablet by mouth daily., Disp: , Rfl:      omeprazole (PRILOSEC) 20 MG DR capsule, Take 1 capsule (20 mg) by mouth daily, Disp: 90 capsule, Rfl: 4     pravastatin (PRAVACHOL) 20 MG tablet, Take 1 tablet (20 mg) by mouth daily, Disp: 90 tablet, Rfl: 3    Current Facility-Administered Medications:      cyanocobalamin injection 1,000 mcg, 1,000 mcg, Intramuscular, Q30 Days, Js Roberson MD, 1,000 mcg at 11/19/21 1714     ALLERGIES:    Allergies   Allergen Reactions     Aspirin (Tartrazine Only) [Tartrazine] Other (See Comments)     Abdominal pain     Codeine Nausea and Vomiting        SOCIAL HISTORY:   Social History     Socioeconomic History     Marital status: Single     Spouse name: Not on file     Number of children: Not on file     Years of education: Not on file     Highest education level: Not on file   Occupational History     Not on file   Tobacco Use     Smoking status: Never     Smokeless tobacco: Never   Vaping Use     Vaping Use: Never used   Substance and Sexual Activity     Alcohol use: No      Drug use: No     Sexual activity: Never   Other Topics Concern     Not on file   Social History Narrative      2020.  Three daughters and one son.  Numerous grandchildren and great grandchildren.    Granddaughter, Loren, handles things for her.   Originally from [place].      Social Determinants of Health     Financial Resource Strain: Not on file   Food Insecurity: Not on file   Transportation Needs: Not on file   Physical Activity: Not on file   Stress: Not on file   Social Connections: Not on file   Intimate Partner Violence: Not on file   Housing Stability: Not on file        FAMILY HISTORY:   Family History   Problem Relation Age of Onset     No Known Problems Mother      No Known Problems Father      Thyroid Cancer Sister      Pancreatic Cancer Sister      Myocardial Infarction Brother      Diabetes Brother      No Known Problems Brother      No Known Problems Daughter      No Known Problems Daughter      No Known Problems Daughter      No Known Problems Son         disabled         EXAM:Vitals: BP (!) 142/80   Wt 96.6 kg (213 lb)   LMP  (LMP Unknown)   BMI 40.25 kg/m      A1C: 6.9 (2023)    General appearance: Patient is alert and fully cooperative with history & exam.  No sign of distress is noted during the visit.     Psychiatric: Affect is pleasant & appropriate.  Patient appears motivated to improve health.     Respiratory: Breathing is regular & unlabored while sitting.     HEENT: Hearing is intact to spoken word.  Speech is clear.  No gross evidence of visual impairment that would impact ambulation.     Dermatologic: Skin is intact to both lower extremities without significant lesions, rash or abrasion.  No paronychia or evidence of soft tissue infection is noted.     Vascular: DP & PT pulses are intact & regular bilaterally.  No significant edema or varicosities noted.  CFT and skin temperature is normal to both lower extremities.     Neurologic: Lower  extremity sensation is intact to light touch.  No evidence of weakness or contracture in the lower extremities.  No evidence of neuropathy.     Musculoskeletal: Patient is ambulatory without assistive device or brace.  Pain on palpation of the posterior aspect of the right Achilles tendon insertion.  Pain with dorsiflexion and plantarflexion to this area of the right foot.    Radiographs: right heel - I personally reviewed the xrays -  Moderate calcaneal enthesophytes. There is also a small amount of calcification in the distal Achilles tendon which may be from chronic degeneration or hydroxyapatite deposition. No fracture.    Mri right heel -  Acute on chronic Achilles tendon tendinopathy. No evidence for tearing but there is some reactive enthesitis at the attachment. Overall thickening of the Achilles tendon. Mild surrounding edema more proximally along the course of the tendon.  2.  Mild peroneus longus brevis and longus tendinopathy without tearing.  3.  Mild posterior tibial tendon tendinopathy without tearing.  4.  No ligamentous pathology or dysfunction.  5.  No evidence for fracture or significant marrow signal abnormality.     ASSESSMENT:    Achilles tendinitis of right lower extremity  Right foot pain  Type 2 diabetes mellitus with diabetic neuropathy, without long-term current use of insulin (H)     Medical Decision Making/Plan:  Reviewed patient's chart in [facility].  Reviewed and discussed x-rays and MRI results with patient and patient's daughter. Reviewed and discussed causes of tendonitis.  We discussed treatments such as immobiliation, icing, stretching, heel lifts, orthotics, physical therapy, MRI.     At this time I would recommend a short Aircast boot for the patient for the next 5 weeks to help take pressure off of the Achilles tendon.  Would also recommend physical therapy with iontophoresis to try to help calm down inflammation to the tendon and help with pain.  Discussed that we do not want to  inject the tendon because that can cause rupturing.  We will order an oral prednisone medication to also help with pain and swelling to the area.  If there is no relief in 5 weeks then we discussed we may have to go in and discuss surgery to debride the area however this would be quite involved and she would have to be nonweightbearing for at least 4 to 6 weeks and minimal weightbearing in a boot for 4 to 6 weeks after the nonweightbearing.    All questions were answered to patient and patient's daughter satisfaction they will call further questions or concerns.    Patient risk factor: Patient is at medium risk for action.        Constance Escobedo DPM, Podiatry/Foot and Ankle Surgery     Continue Regimen: Humira injection will continue at this time Detail Level: Zone Render In Strict Bullet Format?: No

## 2023-07-19 ENCOUNTER — THERAPY VISIT (OUTPATIENT)
Dept: PHYSICAL THERAPY | Facility: REHABILITATION | Age: 88
End: 2023-07-19
Attending: PODIATRIST
Payer: COMMERCIAL

## 2023-07-19 DIAGNOSIS — M76.61 ACHILLES TENDINITIS OF RIGHT LOWER EXTREMITY: ICD-10-CM

## 2023-07-19 DIAGNOSIS — M79.671 RIGHT FOOT PAIN: ICD-10-CM

## 2023-07-19 DIAGNOSIS — E11.40 TYPE 2 DIABETES MELLITUS WITH DIABETIC NEUROPATHY, WITHOUT LONG-TERM CURRENT USE OF INSULIN (H): ICD-10-CM

## 2023-07-19 PROCEDURE — 97161 PT EVAL LOW COMPLEX 20 MIN: CPT | Mod: GP | Performed by: PHYSICAL THERAPIST

## 2023-07-19 PROCEDURE — 97110 THERAPEUTIC EXERCISES: CPT | Mod: GP | Performed by: PHYSICAL THERAPIST

## 2023-07-19 NOTE — PROGRESS NOTES
PHYSICAL THERAPY EVALUATION  Type of Visit: Evaluation    See electronic medical record for Abuse and Falls Screening details.    Subjective       Presenting condition or subjective complaint: achilles pain  Date of onset: 07/18/23   40 days ago  Relevant medical history: Cancer; Hearing problems; Implanted device; Menopause (glaucoma, B artificial knees)   Dates & types of surgery: B TKA    Prior diagnostic imaging/testing results: MRI     Prior therapy history for the same diagnosis, illness or injury: No      Prior Level of Function  Transfers: Independent  Ambulation: Independent  ADL: Independent  IADL: Finances, Housekeeping, Laundry, Meal preparation, Medication management, Yard work    Living Environment  Social support: Alone   Type of home: House   Stairs to enter the home: Yes 5 Is there a railing: Yes   Ramp: No   Stairs inside the home: Yes 12 Is there a railing: Yes   Help at home: None  Equipment owned: [employer]     Employment: No    Hobbies/Interests: gardening    Patient goals for therapy: walking normal pace, garden,    Pain assessment: See objective evaluation for additional pain details     Objective   FOOT/ANKLE EVALUATION  PAIN: Pain Level at Rest: 3/10  Pain Level with Use: 7/10  Pain Location: ankle  Pain Quality: Sharp  Pain Frequency: constant  Pain is Worst: evening after being on feet  Pain is Exacerbated By: weight bearing  Pain is Relieved By: capsisin  Pain Progression: improved since boot  INTEGUMENTARY (edema, incisions): some change after bumping  POSTURE: Sitting Posture: Rounded shoulders, Forward head, Lordosis decreased, Thoracic kyphosis increased  GAIT:   Weightbearing Status: WBAT  Assistive Device(s): Cane (single end), boot/air boot  Gait Deviations: Antalgic  excess lateral lean, decreased stance time on R, and decreased swing L with increased stance on L   BALANCE/PROPRIOCEPTION: Single Leg Stance Eyes Open (seconds): decreased B unable to try on R LE  ROM:   (Degrees)  Left AROM Left PROM  Right AROM Right PROM   Ankle Dorsiflexion 5-10  5-10    Ankle Plantarflexion WFL  WFL    Ankle Inversion WFL  WFL    Ankle Eversion 0-5 WFL 0-5 WFL   Great Toe Flexion       Great Toe Extension       Pain:   End feel:     STRENGTH:   Pain: - none + mild ++ moderate +++ severe  Strength Scale: 0-5/5 Left Right   Ankle Dorsiflexion 4+ 4+   Ankle Plantarflexion 3+ 2+   Ankle Inversion 3+ 3+   Ankle Eversion 2+ 2+   Great Toe Flexion     Great Toe Extension     Anterior Tibialis     Posterior Tibialis     Peroneals     Extensor Digitorum     Gastroc/Soleus       PALPATION: TTP over calcaneal tuberosity and plantarfascia  Assessment & Plan   CLINICAL IMPRESSIONS  Medical Diagnosis: M76.61 (ICD-10-CM) - Achilles tendinitis of right lower extremity  M79.671 (ICD-10-CM) - Right foot pain  E11.40 (ICD-10-CM) - Type 2 diabetes mellitus with diabetic neuropathy, without long-term current use of insulin (H)    Treatment Diagnosis: R ankle pain, gait abnormality   Impression/Assessment: Patient is a 87 year old female with R ankle and foot pain with weight bearing s/p bumping R heel on chair 40 days ago. Patient reports they are able to complete all activities independently R ankle pain is causing mobility to take more time and increasing need for rest. The following significant findings have been identified: Pain, Decreased ROM/flexibility, Decreased joint mobility, Decreased strength, Impaired balance, Decreased proprioception, Impaired sensation and Impaired gait. These impairments interfere with their ability to perform self care tasks, recreational activities, household chores, household mobility and community mobility as compared to previous level of function.     Clinical Decision Making (Complexity):  Clinical Presentation: Stable/Uncomplicated  Clinical Presentation Rationale: based on medical and personal factors listed in PT evaluation  Clinical Decision Making (Complexity): Low  complexity    PLAN OF CARE  Treatment Interventions:  Interventions: Gait Training, Manual Therapy, Neuromuscular Re-education, Therapeutic Activity, Therapeutic Exercise, Self-Care/Home Management Iontophoresis     Long Term Goals     PT Goal 1  Goal Identifier: gardening  Goal Description: Patient will be able to complete gardening without sx 3/10 no need for ankle boot/brace.  Rationale: to maximize safety and independence with performance of ADLs and functional tasks;to maximize safety and independence within the home  Goal Progress: inititaed  Target Date: 09/13/23  PT Goal 2  Goal Identifier: gait  Goal Description: Patient will be able to ambulate/stand x 30 min + with sx 3/10 or less.  Rationale: to maximize safety and independence with performance of ADLs and functional tasks;to maximize safety and independence within the home;to maximize safety and independence within the community;to maximize safety and independence with self cares;to maximize safety and independence with transportation  Goal Progress: initiated  Target Date: 09/13/23  PT Goal 3  Goal Identifier: cooking/cleaning  Goal Description: Patient will be able to complete cooking/cleaning without sx greater than 3/10 at R ankle and typical footwear.  Rationale: to maximize safety and independence with performance of ADLs and functional tasks;to maximize safety and independence within the home  Goal Progress: initiated  Target Date: 09/13/23  PT Goal 4  Goal Identifier: HEP  Goal Description: Patient will be able to complete 6 exercises without assistance for progression to independent management.  Rationale: to maximize safety and independence with performance of ADLs and functional tasks;to maximize safety and independence within the home;to maximize safety and independence within the community  Goal Progress: initiated  Target Date: 09/13/23      Frequency of Treatment: 1 x weekly  Duration of Treatment: 8 weeks    Recommended Referrals to  Other Professionals: none at this time  Education Assessment:   Learner/Method: Patient;Family;Demonstration;Pictures/Video;Reading    Risks and benefits of evaluation/treatment have been explained.   Patient/Family/caregiver agrees with Plan of Care.     Evaluation Time:     PT Eval, Low Complexity Minutes (27015): 30       Signing Clinician: Richard Terrazas PT      [facility]                                                                                   OUTPATIENT PHYSICAL THERAPY      PLAN OF TREATMENT FOR OUTPATIENT REHABILITATION   Patient's Last Name, First Name, Loren Brothers  ARIELLE YOB: 1935   Provider's Name   [facility]   Medical Record No.  [id number]     Onset Date: 07/18/23  Start of Care Date: 07/19/23     Medical Diagnosis:  M76.61 (ICD-10-CM) - Achilles tendinitis of right lower extremity  M79.671 (ICD-10-CM) - Right foot pain  E11.40 (ICD-10-CM) - Type 2 diabetes mellitus with diabetic neuropathy, without long-term current use of insulin (H)      PT Treatment Diagnosis:  R ankle pain, gait abnormality Plan of Treatment  Frequency/Duration: 1 x weekly/ 8 weeks    Certification date from 07/19/23 to 09/13/23         See note for plan of treatment details and functional goals     Richard Terrazas PT                         I CERTIFY THE NEED FOR THESE SERVICES FURNISHED UNDER        THIS PLAN OF TREATMENT AND WHILE UNDER MY CARE     (Physician attestation of this document indicates review and certification of the therapy plan).                  Referring Provider:  Constance Escobedo      Initial Assessment  See Epic Evaluation- Start of Care Date: 07/19/23

## 2023-07-26 ENCOUNTER — THERAPY VISIT (OUTPATIENT)
Dept: PHYSICAL THERAPY | Facility: REHABILITATION | Age: 88
End: 2023-07-26
Payer: COMMERCIAL

## 2023-07-26 DIAGNOSIS — M76.61 ACHILLES TENDINITIS OF RIGHT LOWER EXTREMITY: Primary | ICD-10-CM

## 2023-07-26 DIAGNOSIS — M79.671 RIGHT FOOT PAIN: ICD-10-CM

## 2023-07-26 PROCEDURE — 97110 THERAPEUTIC EXERCISES: CPT | Mod: GP | Performed by: PHYSICAL THERAPIST

## 2023-07-26 PROCEDURE — 97140 MANUAL THERAPY 1/> REGIONS: CPT | Mod: GP | Performed by: PHYSICAL THERAPIST

## 2023-08-23 DIAGNOSIS — E11.40 TYPE 2 DIABETES MELLITUS WITH DIABETIC NEUROPATHY, WITHOUT LONG-TERM CURRENT USE OF INSULIN (H): ICD-10-CM

## 2023-08-23 DIAGNOSIS — I10 ESSENTIAL HYPERTENSION WITH GOAL BLOOD PRESSURE LESS THAN 140/90: ICD-10-CM

## 2023-08-23 RX ORDER — PRAVASTATIN SODIUM 20 MG
20 TABLET ORAL DAILY
Qty: 90 TABLET | Refills: 0 | OUTPATIENT
Start: 2023-08-23

## 2023-08-23 RX ORDER — METOPROLOL SUCCINATE 50 MG/1
50 TABLET, EXTENDED RELEASE ORAL DAILY
Qty: 90 TABLET | Refills: 0 | OUTPATIENT
Start: 2023-08-23

## 2023-08-23 NOTE — TELEPHONE ENCOUNTER
"Refill too soon.      Requested Prescriptions   Pending Prescriptions Disp Refills    metoprolol succinate ER (TOPROL XL) 50 MG 24 hr tablet [Pharmacy Med Name: Metoprolol Succinate ER Oral Tablet Extended Release 24 Hour 50 MG] 90 tablet 0     Sig: Take 1 tablet (50 mg) by mouth daily       Beta-Blockers Protocol Failed - 8/23/2023  2:00 AM        Failed - Blood pressure under 140/90 in past 12 months     BP Readings from Last 3 Encounters:   07/18/23 (!) 142/80   07/17/23 138/72   07/13/23 139/64                 Passed - Patient is age 6 or older        Passed - Recent (12 mo) or future (30 days) visit within the authorizing provider's specialty     Patient has had an office visit with the authorizing provider or a provider within the authorizing providers department within the previous 12 mos or has a future within next 30 days. See \"Patient Info\" tab in inbasket, or \"Choose Columns\" in Meds & Orders section of the refill encounter.              Passed - Medication is active on med list              pravastatin (PRAVACHOL) 20 MG tablet [Pharmacy Med Name: Pravastatin Sodium Oral Tablet 20 MG] 90 tablet 0     Sig: Take 1 tablet (20 mg) by mouth daily       Statins Protocol Passed - 8/23/2023  2:00 AM        Passed - LDL on file in past 12 months     Recent Labs   Lab Test 07/17/23  1300   *             Passed - No abnormal creatine kinase in past 12 months     No lab results found.             Passed - Recent (12 mo) or future (30 days) visit within the authorizing provider's specialty     Patient has had an office visit with the authorizing provider or a provider within the authorizing providers department within the previous 12 mos or has a future within next 30 days. See \"Patient Info\" tab in inbasket, or \"Choose Columns\" in Meds & Orders section of the refill encounter.              Passed - Medication is active on med list        Passed - Patient is age 18 or older        Passed - No active pregnancy " on record        Passed - No positive pregnancy test in past 12 months             Lashell Araiza RN 08/23/23 11:58 AM

## 2023-08-28 ENCOUNTER — THERAPY VISIT (OUTPATIENT)
Dept: PHYSICAL THERAPY | Facility: REHABILITATION | Age: 88
End: 2023-08-28
Payer: COMMERCIAL

## 2023-08-28 DIAGNOSIS — M79.671 RIGHT FOOT PAIN: ICD-10-CM

## 2023-08-28 DIAGNOSIS — M76.61 ACHILLES TENDINITIS OF RIGHT LOWER EXTREMITY: Primary | ICD-10-CM

## 2023-08-28 PROCEDURE — 97110 THERAPEUTIC EXERCISES: CPT | Mod: GP | Performed by: PHYSICAL THERAPIST

## 2023-08-28 PROCEDURE — 97140 MANUAL THERAPY 1/> REGIONS: CPT | Mod: GP | Performed by: PHYSICAL THERAPIST

## 2023-09-01 DIAGNOSIS — I10 ESSENTIAL HYPERTENSION: ICD-10-CM

## 2023-09-01 RX ORDER — LOSARTAN POTASSIUM 100 MG/1
TABLET ORAL
Qty: 90 TABLET | Refills: 0 | Status: SHIPPED | OUTPATIENT
Start: 2023-09-01 | End: 2023-12-27

## 2023-09-01 NOTE — TELEPHONE ENCOUNTER
"Routing refill request to provider for review/approval because:  Failed BP protocol    Last Written Prescription Date:  5/27/23  Last Fill Quantity: 90,  # refills: 0   Last office visit provider:  7/17/23 w/ Dr Marquez     Requested Prescriptions   Pending Prescriptions Disp Refills    losartan (COZAAR) 100 MG tablet [Pharmacy Med Name: Losartan Potassium Oral Tablet 100 MG] 90 tablet 0     Sig: TAKE ONE TABLET BY MOUTH ONE TIME DAILY       Angiotensin-II Receptors Failed - 9/1/2023  9:49 AM        Failed - Last blood pressure under 140/90 in past 12 months     BP Readings from Last 3 Encounters:   07/18/23 (!) 142/80   07/17/23 138/72   07/13/23 139/64                 Passed - Recent (12 mo) or future (30 days) visit within the authorizing provider's specialty     Patient has had an office visit with the authorizing provider or a provider within the authorizing providers department within the previous 12 mos or has a future within next 30 days. See \"Patient Info\" tab in inbasket, or \"Choose Columns\" in Meds & Orders section of the refill encounter.              Passed - Medication is active on med list        Passed - Patient is age 18 or older        Passed - No active pregnancy on record        Passed - Normal serum creatinine on file in past 12 months     Recent Labs   Lab Test 07/17/23  1300   CR 0.77       Ok to refill medication if creatinine is low          Passed - Normal serum potassium on file in past 12 months     Recent Labs   Lab Test 07/17/23  1300   POTASSIUM 4.3                    Passed - No positive pregnancy test in past 12 months             Tran Hart RN 09/01/23 12:51 PM  "

## 2023-09-13 NOTE — PROGRESS NOTES
08/28/23 0500   Appointment Info   Signing clinician's name / credentials Lilibeth Browneari DPT, PT   Total/Authorized Visits 8   Visits Used 3   Medical Diagnosis M76.61 (ICD-10-CM) - Achilles tendinitis of right lower extremity  M79.671 (ICD-10-CM) - Right foot pain  E11.40 (ICD-10-CM) - Type 2 diabetes mellitus with diabetic neuropathy, without long-term current use of insulin (H)   PT Tx Diagnosis R ankle pain, gait abnormality   Progress Note/Certification   Start of Care Date 07/19/23   Onset of illness/injury or Date of Surgery 07/18/23   Therapy Frequency 1 x weekly   Predicted Duration 8 weeks   Certification date from 07/19/23   Certification date to 09/13/23   Progress Note Due Date 09/13/23   PT Goal 1   Goal Identifier gardening   Goal Description Patient will be able to complete gardening without sx 3/10 no need for ankle boot/brace.   Rationale to maximize safety and independence with performance of ADLs and functional tasks;to maximize safety and independence within the home   Goal Progress in progress   Target Date 09/13/23   PT Goal 2   Goal Identifier gait   Goal Description Patient will be able to ambulate/stand x 30 min + with sx 3/10 or less.   Rationale to maximize safety and independence with performance of ADLs and functional tasks;to maximize safety and independence within the home;to maximize safety and independence within the community;to maximize safety and independence with self cares;to maximize safety and independence with transportation   Goal Progress in progress   Target Date 09/13/23   PT Goal 3   Goal Identifier cooking/cleaning   Goal Description Patient will be able to complete cooking/cleaning without sx greater than 3/10 at R ankle and typical footwear.   Rationale to maximize safety and independence with performance of ADLs and functional tasks;to maximize safety and independence within the home   Goal Progress in progress   Target Date 09/13/23   PT Goal 4   Goal Identifier  HEP   Goal Description Patient will be able to complete 6 exercises without assistance for progression to independent management.   Rationale to maximize safety and independence with performance of ADLs and functional tasks;to maximize safety and independence within the home;to maximize safety and independence within the community   Goal Progress in progress   Target Date 09/13/23   Subjective Report   Subjective Report Exercises are going well at home. Symptoms overall are lessening.Still wearing boot. Next week will be 6 weeks.   Iontophoresis   Iontophoresis Minutes (15320) 5   Treatment Detail pamphlet from iontopack with instructions given   Iontophoresis -Type Patch   Location active electrode over achilles insertion on calcaneous   Medication dexamethasone   Patient Response/Progress verbalized understanding, pamphlet from ele[employer] packet given   Therapeutic Procedure/Exercise   Therapeutic Procedures: strength, endurance, ROM, flexibillity minutes (39310) 15   PTRx Ther Proc 1 Ankle Circles in Supine or Long Sitting   PTRx Ther Proc 1 - Details not completed   PTRx Ther Proc 2 Ankle Active Range of Motion Dorsiflexion and Plantarflexion   PTRx Ther Proc 2 - Details not completed   PTRx Ther Proc 3 Ankle Circles   PTRx Ther Proc 3 - Details not completed   PTRx Ther Proc 4 Plantar Fascia Release/Bottle Roll   PTRx Ther Proc 4 - Details not completed   PTRx Ther Proc 5 Seated Toe Raises/Heel Raises   PTRx Ther Proc 5 - Details x 5 each demonstration/review   PTRx Ther Proc 6 Ankle Eversion With Towel   PTRx Ther Proc 6 - Details verbal review   PTRx Ther Proc 7 Ankle Inversion With Towel   PTRx Ther Proc 7 - Details demonstration and review   Skilled Intervention improved mobility and AROM, improved tissue health   Patient Response/Progress education, initiated HEP   PTRx Ther Proc 8 Supine Lumbar Hip Roll   PTRx Ther Proc 8 - Details 3 minutes   PTRx Ther Proc 9 All 4s Stretch   PTRx Ther Proc 9 - Details 5  each direction with 5 second hold   Therapeutic Activity   PTRx Ther Act 1 Instructions for Home Cold Pack Use   PTRx Ther Act 1 - Details No Notes   Manual Therapy   Manual Therapy: Mobilization, MFR, MLD, friction massage minutes (00812) 25   Manual Therapy 1 STW   Manual Therapy 1 - Details gastrocs, soleous, plantar fascia   Skilled Intervention decreased pain, improved mobility   Patient Response/Progress decreased sx with weight bearing   Manual Therapy 2 STW low back   Manual Therapy 2 - Details quadratus lumborum   Education   Learner/Method Patient;Family;Demonstration;Pictures/Video;Reading   Plan   Home program ankle mobility   Plan for next session manual to plantarfascia, NWB stretch, seated toe/heel raises, hip strengthening   Total Session Time   Timed Code Treatment Minutes 45   Total Treatment Time (sum of timed and untimed services) 45       DISCHARGE  Reason for Discharge: [facility]      Discharge Plan: Patient to continue home program.    Referring Provider:  Constance Escobedo

## 2023-10-20 ENCOUNTER — TELEPHONE (OUTPATIENT)
Dept: INTERNAL MEDICINE | Facility: CLINIC | Age: 88
End: 2023-10-20
Payer: COMMERCIAL

## 2023-10-20 DIAGNOSIS — E11.40 TYPE 2 DIABETES MELLITUS WITH DIABETIC NEUROPATHY, WITHOUT LONG-TERM CURRENT USE OF INSULIN (H): ICD-10-CM

## 2023-10-20 DIAGNOSIS — F51.01 PRIMARY INSOMNIA: ICD-10-CM

## 2023-10-20 DIAGNOSIS — I10 ESSENTIAL HYPERTENSION WITH GOAL BLOOD PRESSURE LESS THAN 140/90: ICD-10-CM

## 2023-10-20 DIAGNOSIS — I10 ESSENTIAL HYPERTENSION: ICD-10-CM

## 2023-10-20 DIAGNOSIS — B02.8 HERPES ZOSTER WITH COMPLICATION: ICD-10-CM

## 2023-10-20 DIAGNOSIS — F41.9 ANXIETY: ICD-10-CM

## 2023-10-20 RX ORDER — PRAVASTATIN SODIUM 20 MG
20 TABLET ORAL DAILY
Qty: 90 TABLET | Refills: 0 | Status: SHIPPED | OUTPATIENT
Start: 2023-10-20 | End: 2024-02-07

## 2023-10-20 RX ORDER — METOPROLOL SUCCINATE 50 MG/1
50 TABLET, EXTENDED RELEASE ORAL DAILY
Qty: 90 TABLET | Refills: 0 | Status: SHIPPED | OUTPATIENT
Start: 2023-10-20 | End: 2024-02-07

## 2023-10-20 NOTE — TELEPHONE ENCOUNTER
Please let them know, Dr. Marquez is out of office today, patient will need to be evaluated for further treatment.  Please give them instructions on how to get to the urgency room.

## 2023-10-20 NOTE — TELEPHONE ENCOUNTER
General Call    Contacts         Type Contact Phone/Fax    10/20/2023 01:57 PM CDT Phone (Incoming) Loren Marie (Emergency Contact) [phone]          Reason for Call:  Medication request    What are your questions or concerns: Patient's granddaughter called and stated patient is having a flare up again behind her ear and on the side of her face, her head is getting puffy in the back, wanted to see if pt's pcp would send something over it. Writer offered speaking to a nurse and caller declined. Please advise    Could we send this information to you in [facility] or would you prefer to receive a phone call?:   Patient would prefer a phone call   Okay to leave a detailed message?: Yes at Home number on file [phone] (other)

## 2023-10-20 NOTE — TELEPHONE ENCOUNTER
S-(situation): Phone call message:   Patient's granddaughter called and stated patient is having a flare up again behind her ear and on the side of her face, her head is getting puffy in the back, wanted to see if pt's pcp would send something over it. Writer offered speaking to a nurse and caller declined. Please advise     B-(background): Per granddaughter patient has had this in the past.  It is bothering her again.  Wondering if PCP can give her anything for it.    A-(assessment): Patient has TMJ appointment next month.  Per granddaughter, she had an antibiotic in the past that helped somewhat, but has returned.  Causes headache. She states she doesn't take anything for the headache because it upsets her stomach.      R-(recommendations): Advised to call or go in to ED if patient develops more symptoms, increased headache or fever. Granddaughter verbalized understanding.    Chyna Carlson, IRVINGN RN

## 2023-10-23 RX ORDER — LORAZEPAM 1 MG/1
TABLET ORAL
Qty: 90 TABLET | Refills: 0 | Status: SHIPPED | OUTPATIENT
Start: 2023-10-23 | End: 2024-02-07

## 2023-10-23 RX ORDER — HYDROCHLOROTHIAZIDE 25 MG/1
TABLET ORAL
Qty: 90 TABLET | Refills: 0 | Status: SHIPPED | OUTPATIENT
Start: 2023-10-23 | End: 2024-02-07

## 2023-10-23 RX ORDER — ZOLPIDEM TARTRATE 5 MG/1
5 TABLET ORAL AT BEDTIME
Qty: 90 TABLET | Refills: 0 | OUTPATIENT
Start: 2023-10-23

## 2023-10-23 RX ORDER — FAMCICLOVIR 500 MG/1
500 TABLET ORAL 3 TIMES DAILY
Qty: 21 TABLET | Refills: 0 | Status: SHIPPED | OUTPATIENT
Start: 2023-10-23 | End: 2024-02-12

## 2023-10-23 NOTE — TELEPHONE ENCOUNTER
Pain started around Wednesday last week.   Pt continues to have right sided tenderness and right sided ear pain.   The tendersness and pain is constant.  Tenderness radiates into jaw.   Pt's granddaught reports no fever.   Pt has not taken any OTC tylenol/ibuprofen, pt has tried cold pack, does not help.   Pt does not use Voltaren gel on her jaw, she uses this for other things but not TMJ (voltaren gel was recommended by Dr. Marquez at 4/3/23 visit where TMJ was discussed).     Pt's granddaughter reports that pt thinks that her symptoms could be shingles, again.    Pt was prescribed famciclovir in january 2023 (see 1/30/23 telephone encounter) for reported shingles symptoms.      Pt's granddaughter states that patient will not go to urgency room for evaluation; pt would come in for visit with Dr. Marquez but pt's granddaughter reports that patient received an antibiotic earlier this year for these symptoms. I was unable to locate a prescription from Dr. Marquez for this concern.

## 2023-10-23 NOTE — TELEPHONE ENCOUNTER
Relayed provider advisement to patient's granddaughter Loren.  Granddaughter verbalized understanding.  No further questions at this time.

## 2023-10-23 NOTE — TELEPHONE ENCOUNTER
It looks like Dr. Js Roberson sent an antiviral medication in January for presumed shingles.  I did refill this but I agree that it would be a good idea for her to be seen especially if its not improving

## 2023-12-27 DIAGNOSIS — I10 ESSENTIAL HYPERTENSION: ICD-10-CM

## 2023-12-27 RX ORDER — LOSARTAN POTASSIUM 100 MG/1
TABLET ORAL
Qty: 90 TABLET | Refills: 0 | Status: SHIPPED | OUTPATIENT
Start: 2023-12-27 | End: 2024-02-12

## 2023-12-29 NOTE — LETTER
Letter by Js Roberson MD at      Author: Js Roberson MD Service: -- Author Type: --    Filed:  Encounter Date: 11/1/2020 Status: (Other)         Loren Marie  [address] [place] [address]             November 1, 2020         Dear Ms. Marie,    Below are the results from your recent visit:    Resulted Orders   Comprehensive Metabolic Panel   Result Value Ref Range    Sodium 141 136 - 145 mmol/L    Potassium 5.0 3.5 - 5.0 mmol/L    Chloride 103 98 - 107 mmol/L    CO2 30 22 - 31 mmol/L    Anion Gap, Calculation 8 5 - 18 mmol/L    Glucose 79 70 - 125 mg/dL    BUN 14 8 - 28 mg/dL    Creatinine 0.85 0.60 - 1.10 mg/dL    GFR MDRD Af Amer >60 >60 mL/min/1.73m2    GFR MDRD Non Af Amer >60 >60 mL/min/1.73m2    Bilirubin, Total 0.3 0.0 - 1.0 mg/dL    Calcium 9.2 8.5 - 10.5 mg/dL    Protein, Total 7.1 6.0 - 8.0 g/dL    Albumin 4.1 3.5 - 5.0 g/dL    Alkaline Phosphatase 83 45 - 120 U/L    AST 23 0 - 40 U/L    ALT 16 0 - 45 U/L    Narrative    Fasting Glucose reference range is 70-99 mg/dL per  American Diabetes Association (ADA) guidelines.   Glycosylated Hemoglobin A1c   Result Value Ref Range    Hemoglobin A1c 5.8 (H) <=5.6 %      Comment:      Normal <5.7% Prediabete 5.7-6.4% Diabletes 6.5% or higher - adopted from ADA consensus guidelines   Thyroid Stimulating Hormone (TSH)   Result Value Ref Range    TSH 1.88 0.30 - 5.00 uIU/mL       Your blood tests all look great and your mild diabetes is well controlled    Please call with questions or contact us using [employer].    Sincerely,        Electronically signed by Js Roberson MD        show

## 2024-02-07 DIAGNOSIS — K21.00 GASTROESOPHAGEAL REFLUX DISEASE WITH ESOPHAGITIS WITHOUT HEMORRHAGE: ICD-10-CM

## 2024-02-07 DIAGNOSIS — F41.9 ANXIETY: ICD-10-CM

## 2024-02-07 DIAGNOSIS — I10 ESSENTIAL HYPERTENSION WITH GOAL BLOOD PRESSURE LESS THAN 140/90: ICD-10-CM

## 2024-02-07 DIAGNOSIS — E11.40 TYPE 2 DIABETES MELLITUS WITH DIABETIC NEUROPATHY, WITHOUT LONG-TERM CURRENT USE OF INSULIN (H): ICD-10-CM

## 2024-02-07 DIAGNOSIS — F51.01 PRIMARY INSOMNIA: ICD-10-CM

## 2024-02-07 DIAGNOSIS — I10 ESSENTIAL HYPERTENSION: ICD-10-CM

## 2024-02-07 RX ORDER — HYDROCHLOROTHIAZIDE 25 MG/1
TABLET ORAL
Qty: 90 TABLET | Refills: 0 | Status: SHIPPED | OUTPATIENT
Start: 2024-02-07 | End: 2024-02-12

## 2024-02-07 RX ORDER — LORAZEPAM 1 MG/1
TABLET ORAL
Qty: 90 TABLET | Refills: 0 | Status: SHIPPED | OUTPATIENT
Start: 2024-02-07 | End: 2024-05-06

## 2024-02-07 RX ORDER — PRAVASTATIN SODIUM 20 MG
20 TABLET ORAL DAILY
Qty: 90 TABLET | Refills: 0 | Status: SHIPPED | OUTPATIENT
Start: 2024-02-07 | End: 2024-02-12

## 2024-02-07 RX ORDER — METOPROLOL SUCCINATE 50 MG/1
50 TABLET, EXTENDED RELEASE ORAL DAILY
Qty: 90 TABLET | Refills: 0 | Status: SHIPPED | OUTPATIENT
Start: 2024-02-07 | End: 2024-02-12

## 2024-02-07 RX ORDER — AMLODIPINE BESYLATE 5 MG/1
5 TABLET ORAL DAILY
Qty: 90 TABLET | Refills: 0 | Status: SHIPPED | OUTPATIENT
Start: 2024-02-07 | End: 2024-02-12

## 2024-02-12 ENCOUNTER — OFFICE VISIT (OUTPATIENT)
Dept: INTERNAL MEDICINE | Facility: CLINIC | Age: 89
End: 2024-02-12
Payer: COMMERCIAL

## 2024-02-12 VITALS
TEMPERATURE: 97.4 F | SYSTOLIC BLOOD PRESSURE: 161 MMHG | WEIGHT: 210.7 LBS | HEART RATE: 67 BPM | RESPIRATION RATE: 11 BRPM | OXYGEN SATURATION: 99 % | HEIGHT: 61 IN | DIASTOLIC BLOOD PRESSURE: 73 MMHG | BODY MASS INDEX: 39.78 KG/M2

## 2024-02-12 DIAGNOSIS — H90.6 MIXED CONDUCTIVE AND SENSORINEURAL HEARING LOSS OF BOTH EARS: ICD-10-CM

## 2024-02-12 DIAGNOSIS — H40.002 GLAUCOMA SUSPECT, LEFT: ICD-10-CM

## 2024-02-12 DIAGNOSIS — E11.40 TYPE 2 DIABETES MELLITUS WITH DIABETIC NEUROPATHY, WITHOUT LONG-TERM CURRENT USE OF INSULIN (H): Primary | ICD-10-CM

## 2024-02-12 DIAGNOSIS — H61.23 BILATERAL IMPACTED CERUMEN: ICD-10-CM

## 2024-02-12 DIAGNOSIS — I10 ESSENTIAL HYPERTENSION: ICD-10-CM

## 2024-02-12 LAB
ANION GAP SERPL CALCULATED.3IONS-SCNC: 12 MMOL/L (ref 7–15)
BUN SERPL-MCNC: 18.3 MG/DL (ref 8–23)
CALCIUM SERPL-MCNC: 8.8 MG/DL (ref 8.8–10.2)
CHLORIDE SERPL-SCNC: 106 MMOL/L (ref 98–107)
CREAT SERPL-MCNC: 0.84 MG/DL (ref 0.51–0.95)
DEPRECATED HCO3 PLAS-SCNC: 22 MMOL/L (ref 22–29)
EGFRCR SERPLBLD CKD-EPI 2021: 67 ML/MIN/1.73M2
ERYTHROCYTE [DISTWIDTH] IN BLOOD BY AUTOMATED COUNT: 15.6 % (ref 10–15)
GLUCOSE SERPL-MCNC: 113 MG/DL (ref 70–99)
HBA1C MFR BLD: 6.7 % (ref 0–5.6)
HCT VFR BLD AUTO: 40.9 % (ref 35–47)
HGB BLD-MCNC: 12.7 G/DL (ref 11.7–15.7)
MCH RBC QN AUTO: 25.7 PG (ref 26.5–33)
MCHC RBC AUTO-ENTMCNC: 31.1 G/DL (ref 31.5–36.5)
MCV RBC AUTO: 83 FL (ref 78–100)
PLATELET # BLD AUTO: 309 10E3/UL (ref 150–450)
POTASSIUM SERPL-SCNC: 3.6 MMOL/L (ref 3.4–5.3)
RBC # BLD AUTO: 4.94 10E6/UL (ref 3.8–5.2)
SODIUM SERPL-SCNC: 140 MMOL/L (ref 135–145)
WBC # BLD AUTO: 4.5 10E3/UL (ref 4–11)

## 2024-02-12 PROCEDURE — 99214 OFFICE O/P EST MOD 30 MIN: CPT | Mod: 25 | Performed by: INTERNAL MEDICINE

## 2024-02-12 PROCEDURE — 83036 HEMOGLOBIN GLYCOSYLATED A1C: CPT | Performed by: INTERNAL MEDICINE

## 2024-02-12 PROCEDURE — 80048 BASIC METABOLIC PNL TOTAL CA: CPT | Performed by: INTERNAL MEDICINE

## 2024-02-12 PROCEDURE — 36415 COLL VENOUS BLD VENIPUNCTURE: CPT | Performed by: INTERNAL MEDICINE

## 2024-02-12 PROCEDURE — 90662 IIV NO PRSV INCREASED AG IM: CPT | Performed by: INTERNAL MEDICINE

## 2024-02-12 PROCEDURE — G0008 ADMIN INFLUENZA VIRUS VAC: HCPCS | Performed by: INTERNAL MEDICINE

## 2024-02-12 PROCEDURE — 90480 ADMN SARSCOV2 VAC 1/ONLY CMP: CPT | Performed by: INTERNAL MEDICINE

## 2024-02-12 PROCEDURE — 85027 COMPLETE CBC AUTOMATED: CPT | Performed by: INTERNAL MEDICINE

## 2024-02-12 PROCEDURE — 91320 SARSCV2 VAC 30MCG TRS-SUC IM: CPT | Performed by: INTERNAL MEDICINE

## 2024-02-12 RX ORDER — PRAVASTATIN SODIUM 20 MG
20 TABLET ORAL DAILY
Qty: 90 TABLET | Refills: 4 | Status: SHIPPED | OUTPATIENT
Start: 2024-02-12

## 2024-02-12 RX ORDER — METOPROLOL SUCCINATE 50 MG/1
50 TABLET, EXTENDED RELEASE ORAL DAILY
Qty: 90 TABLET | Refills: 4 | Status: SHIPPED | OUTPATIENT
Start: 2024-02-12 | End: 2024-06-21

## 2024-02-12 RX ORDER — LOSARTAN POTASSIUM 100 MG/1
100 TABLET ORAL DAILY
Qty: 90 TABLET | Refills: 4 | Status: SHIPPED | OUTPATIENT
Start: 2024-02-12

## 2024-02-12 RX ORDER — AMLODIPINE BESYLATE 5 MG/1
5 TABLET ORAL DAILY
Qty: 90 TABLET | Refills: 4 | Status: SHIPPED | OUTPATIENT
Start: 2024-02-12 | End: 2024-06-21

## 2024-02-12 RX ORDER — RESPIRATORY SYNCYTIAL VIRUS VACCINE 120MCG/0.5
0.5 KIT INTRAMUSCULAR ONCE
Qty: 1 EACH | Refills: 0 | Status: CANCELLED | OUTPATIENT
Start: 2024-02-12 | End: 2024-02-12

## 2024-02-12 RX ORDER — HYDROCHLOROTHIAZIDE 25 MG/1
25 TABLET ORAL DAILY
Qty: 90 TABLET | Refills: 4 | Status: SHIPPED | OUTPATIENT
Start: 2024-02-12 | End: 2024-06-21

## 2024-02-12 ASSESSMENT — PATIENT HEALTH QUESTIONNAIRE - PHQ9
SUM OF ALL RESPONSES TO PHQ QUESTIONS 1-9: 0
SUM OF ALL RESPONSES TO PHQ QUESTIONS 1-9: 0

## 2024-02-12 NOTE — PROGRESS NOTES
Office Visit - Follow Up   Loren Marie   88 year old female    Date of Visit: 2/12/2024    Chief Complaint   Patient presents with    RECHECK     Follow-up Hypertension, hyperlipidemia        Assessment and Plan   1. Type 2 diabetes mellitus with diabetic neuropathy, without long-term current use of insulin (H)  Has been well-controlled continues to  - HEMOGLOBIN A1C; Future  - pravastatin (PRAVACHOL) 20 MG tablet; Take 1 tablet (20 mg) by mouth daily  Dispense: 90 tablet; Refill: 4  - HEMOGLOBIN A1C    2. Essential hypertension  She has not been taking her medications regularly.  Her blood pressure is borderline elevated, recommend she resume her medications and discussed importance to maintain normal blood pressures in the context of elevated intraocular pressure.  - hydrochlorothiazide (HYDRODIURIL) 25 MG tablet; Take 1 tablet (25 mg) by mouth daily  Dispense: 90 tablet; Refill: 4  - losartan (COZAAR) 100 MG tablet; Take 1 tablet (100 mg) by mouth daily  Dispense: 90 tablet; Refill: 4  - amLODIPine (NORVASC) 5 MG tablet; Take 1 tablet (5 mg) by mouth daily  Dispense: 90 tablet; Refill: 4  - Basic metabolic panel  (Ca, Cl, CO2, Creat, Gluc, K, Na, BUN); Future  - CBC with platelets; Future  - Basic metabolic panel  (Ca, Cl, CO2, Creat, Gluc, K, Na, BUN)  - CBC with platelets    3. Mixed conductive and sensorineural hearing loss of both ears   Adult Audiology  Referral; Future  - Adult ENT  Referral; Future    4. Bilateral impacted cerumen  She states she was told not to have her ears cleaned with warm water lavage as she has potential disruption of her tympanic membrane  - Adult ENT  Referral; Future    5. Glaucoma suspect, left  Treated with Diamox, now off of this, on drops followed by Dr. Black, vision normal    Return in about 6 months (around 8/12/2024) for Routine preventive.     History of Present Illness   This 88 year old woman comes in for follow-up.  Overall she has been  "doing okay.  She has noticed she is having a hard time hearing and has seen ENT before.  She was told that she was never to have her ears washed out with water because she had a small perforation to her tympanic membrane.  Recently treated for elevated intraocular pressures by Dr. Balck of the left eye.  She is having blurry vision.  She was put on Diamox and now has drops.       Physical Exam   General Appearance:   No acute distress    BP (!) 161/73 (BP Location: Left arm, Patient Position: Sitting, Cuff Size: Adult Large)   Pulse 67   Temp 97.4  F (36.3  C) (Tympanic)   Resp 11   Ht 1.549 m (5' 1\")   Wt 95.6 kg (210 lb 11.2 oz)   SpO2 99%   Breastfeeding No   BMI 39.81 kg/m      She has cerumen impaction bilaterally.  Heart rate controlled rhythm regular lungs clear to auscultation bilaterally     Additional Information   Current Outpatient Medications   Medication Sig Dispense Refill    amLODIPine (NORVASC) 5 MG tablet Take 1 tablet (5 mg) by mouth daily 90 tablet 4    cholecalciferol, vitamin D3, (VITAMIN D3 ORAL) [CHOLECALCIFEROL, VITAMIN D3, (VITAMIN D3 ORAL)] Take 2,000 Units by mouth daily.      hydrochlorothiazide (HYDRODIURIL) 25 MG tablet Take 1 tablet (25 mg) by mouth daily 90 tablet 4    latanoprost (XALATAN) 0.005 % ophthalmic solution [LATANOPROST (XALATAN) 0.005 % OPHTHALMIC SOLUTION] Use as directed in both eyes daily 2.5 mL 6    LORazepam (ATIVAN) 1 MG tablet TAKE ONE TABLET BY MOUTH AT BEDTIME 90 tablet 0    losartan (COZAAR) 100 MG tablet Take 1 tablet (100 mg) by mouth daily 90 tablet 4    metoprolol succinate ER (TOPROL XL) 50 MG 24 hr tablet Take 1 tablet (50 mg) by mouth daily 90 tablet 4    multivitamin-minerals-lutein (CENTRUM SILVER) tablet [MULTIVITAMIN-MINERALS-LUTEIN (CENTRUM SILVER) TABLET] Take 1 tablet by mouth daily.      omeprazole (PRILOSEC) 20 MG DR capsule TAKE ONE CAPSULE BY MOUTH ONE TIME DAILY 90 capsule 1    pravastatin (PRAVACHOL) 20 MG tablet Take 1 tablet (20 mg) " by mouth daily 90 tablet 4       Time:      Wolf Marquez MD  Answers submitted by the patient for this visit:  Patient Health Questionnaire (Submitted on 2/12/2024)  PHQ9 TOTAL SCORE: 0  Hypertension Visit (Submitted on 2/7/2024)  Chief Complaint: Chronic problems general questions HPI Form  Do you check your blood pressure regularly outside of the clinic?: Yes  Are your blood pressures ever more than 140 on the top number (systolic) OR more than 90 on the bottom number (diastolic)? (For example, greater than 140/90): Yes  Are you following a low salt diet?: Yes  Migraine Visit Questionnaire (Submitted on 2/7/2024)  Chief Complaint: Chronic problems general questions HPI Form  Headache Symptoms are: no change  How often are you getting headaches or migraines? : Intermittently  Are you able to do normal daily activities when you have a migraine?: Yes  Migraine Rescue/Relief Medications:: no rescue/relief medications  Effectiveness of rescue/relief medications:: I get some relief  Migraine Preventative Medications:: no medications to prevent migraines  ER or UC Visits:: 0 times  General Questionnaire (Submitted on 2/7/2024)  Chief Complaint: Chronic problems general questions HPI Form  What is the reason for your visit today? : Follow up  How many servings of fruits and vegetables do you eat daily?: 4 or more  On average, how many sweetened beverages do you drink each day (Examples: soda, juice, sweet tea, etc.  Do NOT count diet or artificially sweetened beverages)?: 0  How many minutes a day do you exercise enough to make your heart beat faster?: 30 to 60  How many days a week do you exercise enough to make your heart beat faster?: 3 or less  How many days per week do you miss taking your medication?: 0

## 2024-05-04 DIAGNOSIS — F51.01 PRIMARY INSOMNIA: ICD-10-CM

## 2024-05-04 DIAGNOSIS — F41.9 ANXIETY: ICD-10-CM

## 2024-05-06 RX ORDER — LORAZEPAM 1 MG/1
TABLET ORAL
Qty: 90 TABLET | Refills: 0 | Status: SHIPPED | OUTPATIENT
Start: 2024-05-06 | End: 2024-06-14

## 2024-06-14 DIAGNOSIS — F51.01 PRIMARY INSOMNIA: ICD-10-CM

## 2024-06-14 DIAGNOSIS — F41.9 ANXIETY: ICD-10-CM

## 2024-06-14 RX ORDER — LORAZEPAM 1 MG/1
TABLET ORAL
Qty: 90 TABLET | Refills: 0 | Status: SHIPPED | OUTPATIENT
Start: 2024-06-14 | End: 2024-08-13

## 2024-06-21 ENCOUNTER — OFFICE VISIT (OUTPATIENT)
Dept: INTERNAL MEDICINE | Facility: CLINIC | Age: 89
End: 2024-06-21
Payer: COMMERCIAL

## 2024-06-21 ENCOUNTER — ANCILLARY PROCEDURE (OUTPATIENT)
Dept: GENERAL RADIOLOGY | Facility: CLINIC | Age: 89
End: 2024-06-21
Attending: INTERNAL MEDICINE
Payer: COMMERCIAL

## 2024-06-21 VITALS
DIASTOLIC BLOOD PRESSURE: 76 MMHG | WEIGHT: 206.6 LBS | OXYGEN SATURATION: 98 % | HEART RATE: 71 BPM | SYSTOLIC BLOOD PRESSURE: 155 MMHG | TEMPERATURE: 97.7 F | RESPIRATION RATE: 16 BRPM | HEIGHT: 62 IN | BODY MASS INDEX: 38.02 KG/M2

## 2024-06-21 DIAGNOSIS — M79.641 PAIN OF RIGHT HAND: ICD-10-CM

## 2024-06-21 DIAGNOSIS — E11.40 TYPE 2 DIABETES MELLITUS WITH DIABETIC NEUROPATHY, WITHOUT LONG-TERM CURRENT USE OF INSULIN (H): ICD-10-CM

## 2024-06-21 DIAGNOSIS — I10 ESSENTIAL HYPERTENSION: ICD-10-CM

## 2024-06-21 DIAGNOSIS — M79.10 MYALGIA: ICD-10-CM

## 2024-06-21 DIAGNOSIS — R53.83 OTHER FATIGUE: Primary | ICD-10-CM

## 2024-06-21 DIAGNOSIS — R79.82 CRP ELEVATED: ICD-10-CM

## 2024-06-21 LAB
ALBUMIN SERPL BCG-MCNC: 4.4 G/DL (ref 3.5–5.2)
ALP SERPL-CCNC: 86 U/L (ref 40–150)
ALT SERPL W P-5'-P-CCNC: 17 U/L (ref 0–50)
ANION GAP SERPL CALCULATED.3IONS-SCNC: 10 MMOL/L (ref 7–15)
AST SERPL W P-5'-P-CCNC: 33 U/L (ref 0–45)
BILIRUB SERPL-MCNC: 0.3 MG/DL
BUN SERPL-MCNC: 16.6 MG/DL (ref 8–23)
CALCIUM SERPL-MCNC: 9.3 MG/DL (ref 8.8–10.2)
CHLORIDE SERPL-SCNC: 101 MMOL/L (ref 98–107)
CREAT SERPL-MCNC: 0.74 MG/DL (ref 0.51–0.95)
CRP SERPL-MCNC: 30 MG/L
DEPRECATED HCO3 PLAS-SCNC: 29 MMOL/L (ref 22–29)
EGFRCR SERPLBLD CKD-EPI 2021: 78 ML/MIN/1.73M2
ERYTHROCYTE [DISTWIDTH] IN BLOOD BY AUTOMATED COUNT: 14.5 % (ref 10–15)
ERYTHROCYTE [SEDIMENTATION RATE] IN BLOOD BY WESTERGREN METHOD: 25 MM/HR (ref 0–30)
GLUCOSE SERPL-MCNC: 99 MG/DL (ref 70–99)
HBA1C MFR BLD: 6.6 % (ref 0–5.6)
HCT VFR BLD AUTO: 40.9 % (ref 35–47)
HGB BLD-MCNC: 12.9 G/DL (ref 11.7–15.7)
MCH RBC QN AUTO: 26.2 PG (ref 26.5–33)
MCHC RBC AUTO-ENTMCNC: 31.5 G/DL (ref 31.5–36.5)
MCV RBC AUTO: 83 FL (ref 78–100)
PLATELET # BLD AUTO: 280 10E3/UL (ref 150–450)
POTASSIUM SERPL-SCNC: 4.1 MMOL/L (ref 3.4–5.3)
PROT SERPL-MCNC: 7.6 G/DL (ref 6.4–8.3)
RBC # BLD AUTO: 4.92 10E6/UL (ref 3.8–5.2)
SODIUM SERPL-SCNC: 140 MMOL/L (ref 135–145)
TSH SERPL DL<=0.005 MIU/L-ACNC: 1.4 UIU/ML (ref 0.3–4.2)
WBC # BLD AUTO: 5.2 10E3/UL (ref 4–11)

## 2024-06-21 PROCEDURE — 80053 COMPREHEN METABOLIC PANEL: CPT | Performed by: INTERNAL MEDICINE

## 2024-06-21 PROCEDURE — 85027 COMPLETE CBC AUTOMATED: CPT | Performed by: INTERNAL MEDICINE

## 2024-06-21 PROCEDURE — 83036 HEMOGLOBIN GLYCOSYLATED A1C: CPT | Performed by: INTERNAL MEDICINE

## 2024-06-21 PROCEDURE — 99214 OFFICE O/P EST MOD 30 MIN: CPT | Performed by: INTERNAL MEDICINE

## 2024-06-21 PROCEDURE — 73130 X-RAY EXAM OF HAND: CPT | Mod: TC | Performed by: RADIOLOGY

## 2024-06-21 PROCEDURE — 36415 COLL VENOUS BLD VENIPUNCTURE: CPT | Performed by: INTERNAL MEDICINE

## 2024-06-21 PROCEDURE — G2211 COMPLEX E/M VISIT ADD ON: HCPCS | Performed by: INTERNAL MEDICINE

## 2024-06-21 PROCEDURE — 86140 C-REACTIVE PROTEIN: CPT | Performed by: INTERNAL MEDICINE

## 2024-06-21 PROCEDURE — 84443 ASSAY THYROID STIM HORMONE: CPT | Performed by: INTERNAL MEDICINE

## 2024-06-21 PROCEDURE — 85652 RBC SED RATE AUTOMATED: CPT | Performed by: INTERNAL MEDICINE

## 2024-06-21 RX ORDER — AMLODIPINE BESYLATE 10 MG/1
10 TABLET ORAL DAILY
Qty: 90 TABLET | Refills: 4 | Status: SHIPPED | OUTPATIENT
Start: 2024-06-21 | End: 2024-08-13

## 2024-06-21 RX ORDER — RESPIRATORY SYNCYTIAL VIRUS VACCINE 120MCG/0.5
0.5 KIT INTRAMUSCULAR ONCE
Qty: 1 EACH | Refills: 0 | Status: CANCELLED | OUTPATIENT
Start: 2024-06-21 | End: 2024-06-21

## 2024-06-21 RX ORDER — METOPROLOL SUCCINATE 25 MG/1
25 TABLET, EXTENDED RELEASE ORAL DAILY
Qty: 90 TABLET | Refills: 4 | Status: SHIPPED | OUTPATIENT
Start: 2024-06-21 | End: 2024-08-13

## 2024-06-21 NOTE — PROGRESS NOTES
Office Visit - Follow Up   Loren Marie   88 year old female    Date of Visit: 6/21/2024    Chief Complaint   Patient presents with    Office Deposit     Pt feeling very drained and tired for several months. Right hand feels numb on a daily basis.    Recheck Medication        Assessment and Plan   1. Other fatigue  Etiology uncertain, check labs as below, will modify medications as below.    2. Essential hypertension  Given the postural lightheadedness stop hydrochlorothiazide.  Will increase amlodipine because she is hypertensive we will back off on metoprolol given her fatigue.  Given diabetes and concern for some edema we will add low-dose Jardiance.  Discussed risks benefits of this medication  - amLODIPine (NORVASC) 10 MG tablet; Take 1 tablet (10 mg) by mouth daily  Dispense: 90 tablet; Refill: 4    3. Type 2 diabetes mellitus with diabetic neuropathy, without long-term current use of insulin (H)  - empagliflozin (JARDIANCE) 10 MG TABS tablet; Take 1 tablet (10 mg) by mouth daily  Dispense: 90 tablet; Refill: 4  - Comprehensive metabolic panel; Future  - Hemoglobin A1c; Future  - CBC with platelets; Future  - TSH; Future  - Comprehensive metabolic panel  - Hemoglobin A1c  - CBC with platelets  - TSH    4. Pain of right hand  Likely combination of osteoarthritis and possible carpal tunnel syndrome versus cervical radiculopathy.  She is going to try a wrist splint and some topical Voltaren gel will get x-ray and labs as below.  Offered orthopedic evaluation which she defers for now  - CRP, inflammation; Future  - ESR: Erythrocyte sedimentation rate; Future  - XR Hand Right G/E 3 Views; Future  - CRP, inflammation  - ESR: Erythrocyte sedimentation rate      Return in about 4 weeks (around 7/19/2024) for Follow up.     History of Present Illness   This 88 year old woman comes in for a few issues.  She has been quite fatigued.  She does not sleep well at night and she is not feeling tired during the day.  She  "does has little energy.  She denies any chest pain or shortness of breath or breathing difficulty.  She has had no leg swelling.  No GI symptoms.  Her right wrist hurts.  It is worse in the morning and she will have some paresthesias in the morning as well.  She injured it when she was young.  Has had some postural lightheadedness.       Physical Exam   General Appearance:   No acute distress    BP (!) 155/76   Pulse 71   Temp 97.7  F (36.5  C) (Oral)   Resp 16   Ht 1.562 m (5' 1.5\")   Wt 93.7 kg (206 lb 9.6 oz)   LMP  (LMP Unknown)   SpO2 98%   BMI 38.40 kg/m      No clear synovial inflammation of the wrist some arthritic changes     Additional Information   Current Outpatient Medications   Medication Sig Dispense Refill    amLODIPine (NORVASC) 10 MG tablet Take 1 tablet (10 mg) by mouth daily 90 tablet 4    empagliflozin (JARDIANCE) 10 MG TABS tablet Take 1 tablet (10 mg) by mouth daily 90 tablet 4    metoprolol succinate ER (TOPROL XL) 25 MG 24 hr tablet Take 1 tablet (25 mg) by mouth daily 90 tablet 4    cholecalciferol, vitamin D3, (VITAMIN D3 ORAL) [CHOLECALCIFEROL, VITAMIN D3, (VITAMIN D3 ORAL)] Take 2,000 Units by mouth daily.      latanoprost (XALATAN) 0.005 % ophthalmic solution [LATANOPROST (XALATAN) 0.005 % OPHTHALMIC SOLUTION] Use as directed in both eyes daily 2.5 mL 6    LORazepam (ATIVAN) 1 MG tablet TAKE ONE TABLET BY MOUTH AT BEDTIME 90 tablet 0    losartan (COZAAR) 100 MG tablet Take 1 tablet (100 mg) by mouth daily 90 tablet 4    multivitamin-minerals-lutein (CENTRUM SILVER) tablet [MULTIVITAMIN-MINERALS-LUTEIN (CENTRUM SILVER) TABLET] Take 1 tablet by mouth daily.      omeprazole (PRILOSEC) 20 MG DR capsule TAKE ONE CAPSULE BY MOUTH ONE TIME DAILY 90 capsule 1    pravastatin (PRAVACHOL) 20 MG tablet Take 1 tablet (20 mg) by mouth daily 90 tablet 4       Time:     The longitudinal plan of care for the diagnosis(es)/condition(s) as documented were addressed during this visit. Due to the " added complexity in care, I will continue to support Loren in the subsequent management and with ongoing continuity of care.     Wolf Marquez MD  Answers submitted by the patient for this visit:  General Questionnaire (Submitted on 6/17/2024)  Chief Complaint: Chronic problems general questions HPI Form  How many servings of fruits and vegetables do you eat daily?: 4 or more  On average, how many sweetened beverages do you drink each day (Examples: soda, juice, sweet tea, etc.  Do NOT count diet or artificially sweetened beverages)?: 0  How many minutes a day do you exercise enough to make your heart beat faster?: 30 to 60  How many days a week do you exercise enough to make your heart beat faster?: 5  How many days per week do you miss taking your medication?: 0  General Concern (Submitted on 6/17/2024)  Chief Complaint: Chronic problems general questions HPI Form  What is the reason for your visit today?: Extreme Fatigue  When did your symptoms begin?: 1-2 weeks ago  What are your symptoms?: Always tired and dizziness  How would you describe these symptoms?: Moderate  Are your symptoms:: Staying the same  Have you had these symptoms before?: No

## 2024-06-25 RX ORDER — PREDNISONE 5 MG/1
15 TABLET ORAL DAILY
Qty: 42 TABLET | Refills: 0 | Status: SHIPPED | OUTPATIENT
Start: 2024-06-25 | End: 2024-07-09

## 2024-06-27 DIAGNOSIS — F51.01 PRIMARY INSOMNIA: ICD-10-CM

## 2024-06-27 DIAGNOSIS — F41.9 ANXIETY: ICD-10-CM

## 2024-06-27 RX ORDER — ZOLPIDEM TARTRATE 5 MG/1
5 TABLET ORAL AT BEDTIME
Qty: 90 TABLET | Refills: 0 | Status: SHIPPED | OUTPATIENT
Start: 2024-06-27 | End: 2024-08-13

## 2024-07-25 ENCOUNTER — MYC MEDICAL ADVICE (OUTPATIENT)
Dept: PHARMACY | Facility: OTHER | Age: 89
End: 2024-07-25
Payer: COMMERCIAL

## 2024-07-25 NOTE — TELEPHONE ENCOUNTER
MTM Recruitment: [facility] insurance     Referral outreach attempt #1 on July 25, 2024      Outcome: left voicemail- Call back number [phone] and [employer] message sent    Burak Gould PharmD  Medication Therapy Management Pharmacist  [facility]  Office phone: [phone]

## 2024-08-01 DIAGNOSIS — K21.00 GASTROESOPHAGEAL REFLUX DISEASE WITH ESOPHAGITIS WITHOUT HEMORRHAGE: ICD-10-CM

## 2024-08-02 ENCOUNTER — NURSE TRIAGE (OUTPATIENT)
Dept: INTERNAL MEDICINE | Facility: CLINIC | Age: 89
End: 2024-08-02
Payer: COMMERCIAL

## 2024-08-02 DIAGNOSIS — H81.10 BENIGN PAROXYSMAL POSITIONAL VERTIGO, UNSPECIFIED LATERALITY: Primary | ICD-10-CM

## 2024-08-02 RX ORDER — MECLIZINE HYDROCHLORIDE 25 MG/1
25 TABLET ORAL 3 TIMES DAILY PRN
Qty: 30 TABLET | Refills: 1 | Status: SHIPPED | OUTPATIENT
Start: 2024-08-02

## 2024-08-02 NOTE — TELEPHONE ENCOUNTER
Symptoms noted    Vertigo is difficult to sort out by phone, or in person    Vertigo is a sense of balance which seems to be what she is describing.  This will be associated with a normal heart rate and a normal blood pressure.  Her listed readings are normal.  Continue to monitor heart rate and blood pressure.  We are looking for a heart rate below 50 or blood pressure below 100, if she is having a circulatory problem and lightheadedness    If this is inner ear balance vertigo I will prescribe meclizine to take as needed and a referral to physical therapy to perform inner ear exercises    She is welcome to hold the Jardiance for 1 to 2 weeks to see if that matters

## 2024-08-02 NOTE — TELEPHONE ENCOUNTER
Spoke with patient ra (loretta) on [facility] and relayed recommendation from Dr. Zabala.     Loretta verbalized understanding. Also sent [employer] message with recommendation as requested.

## 2024-08-02 NOTE — TELEPHONE ENCOUNTER
Nurse Triage SBAR    Is this a 2nd Level Triage? YES, LICENSED PRACTITIONER REVIEW IS REQUIRED    Situation:   Loren, patient's granddaughter, CTC on file, called into the clinic to report dizziness symptoms with the patient on 8/2/24.    Loren called the patient and writer was on a three way conversation with the patient and Loren, patient's granddaughter, so writer was able to triage the patient    Background:   Hx of DM2 with diabetic neuropathy, GERD, morbid obesity, HLD, HTN, OA, MDD, insomnia, glaucoma, and anxiety    See office visit notes, from the PCP, on 6/21/24, regarding initiation of Jardianec for patient's DM2     Assessment:  Patient reports dizziness for at least 1 month-concerned the dizziness is related to the Jardiance she started taking on 6/21/24-dizziness seemed to have started around the beginning of the patient's Jardiance usage    Feels intermittent lightheadedness, enough where when she is walking, she needs to hold onto objects, like the bannister, to keep her balance-feels like she is walking sideways    Dizziness is worse when she is outside working in heat    Possible changes in vision, but recently had eye surgery-patient has glaucoma    Slight swelling of lower extremities, but Loren stated that it was from the patient's amlodipine previously    Also having increased fatigue    HR 84 bpm now    BP is 155/83 now-has not taken any medication this morning yet    Mildly itchy in vaginal area-no discharge or bleeding-itching started since taking the Jardiance    Denies fainting, falling, LOC, spinning, or tilting    Denies headache, fever, chest pain, trouble breathing, pain anywhere, vomiting, diarrhea, nausea, injuries, bleeding, weakness or numbness that is new, black/bloody/tarry stool, or rash    Protocol Recommended Disposition:   See in Office Today    Recommendation:   Gave care advice. Recommended that the patient be evaluated in clinic today per disposition.    Writer also  recommended that if there are no available appointments left in clinic today (schedule at [facility] is full for the rest of today), then the patient should be evaluated in the ED or UC.    Patient and Loren verbalized understanding, but declined to go to ED or UC now.    Loren would like the above information to be sent to the PCP for review     Writer will route the above information to the PCP to review and advise next steps.    Call patient's granddaughter, Loren, with PCP's response-ok to leave a detailed message    Routed to provider    Does the patient meet one of the following criteria for ADS visit consideration? 16+ years old, with an [facility] PCP     TIP  Providers, please consider if this condition is appropriate for management at one of our Acute and Diagnostic Services sites.     If patient is a good candidate, please use dotphrase <dot>triageresponse and select Refer to ADS to document.     Reason for Disposition   MODERATE dizziness (e.g., interferes with normal activities)  (Exception: Dizziness caused by heat exposure, sudden standing, or poor fluid intake.)    Additional Information   Negative: SEVERE difficulty breathing (e.g., struggling for each breath, speaks in single words)   Negative: Shock suspected (e.g., cold/pale/clammy skin, too weak to stand, low BP, rapid pulse)   Negative: Difficult to awaken or acting confused (e.g., disoriented, slurred speech)   Negative: Fainted, and still feels dizzy afterwards   Negative: Overdose (accidental or intentional) of medications   Negative: New neurologic deficit that is present now: * Weakness of the face, arm, or leg on one side of the body * Numbness of the face, arm, or leg on one side of the body * Loss of speech or garbled speech   Negative: Heart beating < 50 beats per minute OR > 140 beats per minute   Negative: Sounds like a life-threatening emergency to the triager   Negative: Chest pain   Negative: Rectal bleeding, bloody stool, or  tarry-black stool   Negative: Vomiting is main symptom   Negative: Diarrhea is main symptom   Negative: Headache is main symptom   Negative: Heat exhaustion suspected (i.e., dehydration from heat exposure)   Negative: Patient states that they are having an anxiety or panic attack   Negative: Dizziness from low blood sugar (i.e., < 60 mg/dl or 3.5 mmol/l)   Negative: SEVERE dizziness (e.g., unable to stand, requires support to walk, feels like passing out now)   Negative: SEVERE headache or neck pain   Negative: Spinning or tilting sensation (vertigo) present now and one or more stroke risk factors (i.e., hypertension, diabetes mellitus, prior stroke/TIA, heart attack, age over 60) (Exception: Prior physician evaluation for this AND no different/worse than usual.)   Negative: Neurologic deficit that was brief (now gone), ANY of the following:* Weakness of the face, arm, or leg on one side of the body* Numbness of the face, arm, or leg on one side of the body* Loss of speech or garbled speech   Negative: Loss of vision or double vision  (Exception: Similar to previous migraines.)   Negative: Extra heartbeats, irregular heart beating, or heart is beating very fast (i.e., 'palpitations')   Negative: Difficulty breathing   Negative: Drinking very little and dehydration suspected (e.g., no urine > 12 hours, very dry mouth, very lightheaded)   Negative: Follows bleeding (e.g., stomach, rectum, vagina)  (Exception: Became dizzy from sight of small amount blood.)   Negative: Patient sounds very sick or weak to the triager   Negative: Lightheadedness (dizziness) present now, after 2 hours of rest and fluids   Negative: Spinning or tilting sensation (vertigo) present now   Negative: Fever > 100.0 F (37.8 C) and has diabetes mellitus or a weak immune system (e.g., HIV positive, cancer chemotherapy, organ transplant, splenectomy, chronic steroids)   Negative: Fever > 103 F (39.4 C)    Answer Assessment - Initial Assessment  "Questions  1. DESCRIPTION: \"Describe your dizziness.\"        Started Jardiance and dizziness started shortly after-feels like lightheadedness    Denies spinning and tilting-feels like she is off balance and feels like she is walking sideways    2. LIGHTHEADED: \"Do you feel lightheaded?\" (e.g., somewhat faint, woozy, weak upon standing)        Feels intermittent lightheadedness    3. VERTIGO: \"Do you feel like either you or the room is spinning or tilting?\" (i.e. vertigo)        Denies vertigo    4. SEVERITY: \"How bad is it?\"  \"Do you feel like you are going to faint?\" \"Can you stand and walk?\"    - MILD: Feels slightly dizzy, but walking normally.    - MODERATE: Feels unsteady when walking, but not falling; interferes with normal activities (e.g., school, work).    - SEVERE: Unable to walk without falling, or requires assistance to walk without falling; feels like passing out now.         Balance is affected-has to hold on to the bannister to steady herself    Denies fainting, falling, LOC    5. ONSET:  \"When did the dizziness begin?\"        Started before last office visit-last month    6. AGGRAVATING FACTORS: \"Does anything make it worse?\" (e.g., standing, change in head position)        Working in heat outside, as well as when she gets out of bed      7. HEART RATE: \"Can you tell me your heart rate?\" \"How many beats in 15 seconds?\"  (Note: not all patients can do this)          84 bpm    8. CAUSE: \"What do you think is causing the dizziness?\"        Jardiance possibly    9. RECURRENT SYMPTOM: \"Have you had dizziness before?\" If Yes, ask: \"When was the last time?\" \"What happened that time?\"        None noted    10. OTHER SYMPTOMS: \"Do you have any other symptoms?\" (e.g., fever, chest pain, vomiting, diarrhea, bleeding)          Denies headache, fever, chest pain, trouble breathing, pain anywhere, vomiting, diarrhea, nausea, injuries, bleeding,weakness or numbness that is new, rash    Possible changes in vision, " "but recently had Lasix    Swelling of lower extremities from amlodipine previously    Fatigue    11. PREGNANCY: \"Is there any chance you are pregnant?\" \"When was your last menstrual period?\"          N/A    Protocols used: Dizziness-A-DAMIÁN Murrell RN, BSN  [facility]    " Clear bilaterally, pupils equal, round and reactive to light. + horizontal nystagmus

## 2024-08-08 NOTE — TELEPHONE ENCOUNTER
MTM Recruitment: [facility] insurance     Referral outreach attempt #3 on August 8, 2024      Outcome: left voicemail- Call back number [phone]    Dalila Magana, PharmD, BCACP  Medication Therapy Management Pharmacist

## 2024-08-13 ENCOUNTER — OFFICE VISIT (OUTPATIENT)
Dept: INTERNAL MEDICINE | Facility: CLINIC | Age: 89
End: 2024-08-13
Payer: COMMERCIAL

## 2024-08-13 VITALS
DIASTOLIC BLOOD PRESSURE: 82 MMHG | HEART RATE: 65 BPM | SYSTOLIC BLOOD PRESSURE: 136 MMHG | WEIGHT: 208.6 LBS | TEMPERATURE: 97.3 F | OXYGEN SATURATION: 97 % | HEIGHT: 62 IN | RESPIRATION RATE: 12 BRPM | BODY MASS INDEX: 38.39 KG/M2

## 2024-08-13 DIAGNOSIS — Z00.00 ANNUAL PHYSICAL EXAM: Primary | ICD-10-CM

## 2024-08-13 DIAGNOSIS — E66.01 CLASS 2 SEVERE OBESITY DUE TO EXCESS CALORIES WITH SERIOUS COMORBIDITY AND BODY MASS INDEX (BMI) OF 38.0 TO 38.9 IN ADULT (H): ICD-10-CM

## 2024-08-13 DIAGNOSIS — F51.01 PRIMARY INSOMNIA: ICD-10-CM

## 2024-08-13 DIAGNOSIS — E78.2 MIXED HYPERLIPIDEMIA: ICD-10-CM

## 2024-08-13 DIAGNOSIS — F32.5 MAJOR DEPRESSIVE DISORDER WITH SINGLE EPISODE, IN FULL REMISSION (H): ICD-10-CM

## 2024-08-13 DIAGNOSIS — E66.812 CLASS 2 SEVERE OBESITY DUE TO EXCESS CALORIES WITH SERIOUS COMORBIDITY AND BODY MASS INDEX (BMI) OF 38.0 TO 38.9 IN ADULT (H): ICD-10-CM

## 2024-08-13 DIAGNOSIS — I10 ESSENTIAL HYPERTENSION WITH GOAL BLOOD PRESSURE LESS THAN 140/90: ICD-10-CM

## 2024-08-13 DIAGNOSIS — F41.9 ANXIETY: ICD-10-CM

## 2024-08-13 DIAGNOSIS — M15.0 PRIMARY OSTEOARTHRITIS INVOLVING MULTIPLE JOINTS: ICD-10-CM

## 2024-08-13 DIAGNOSIS — E11.40 TYPE 2 DIABETES MELLITUS WITH DIABETIC NEUROPATHY, WITHOUT LONG-TERM CURRENT USE OF INSULIN (H): ICD-10-CM

## 2024-08-13 DIAGNOSIS — K21.00 GASTROESOPHAGEAL REFLUX DISEASE WITH ESOPHAGITIS WITHOUT HEMORRHAGE: ICD-10-CM

## 2024-08-13 DIAGNOSIS — H40.1134 PRIMARY OPEN ANGLE GLAUCOMA (POAG) OF BOTH EYES, INDETERMINATE STAGE: ICD-10-CM

## 2024-08-13 PROCEDURE — 82570 ASSAY OF URINE CREATININE: CPT | Performed by: INTERNAL MEDICINE

## 2024-08-13 PROCEDURE — 99214 OFFICE O/P EST MOD 30 MIN: CPT | Mod: 25 | Performed by: INTERNAL MEDICINE

## 2024-08-13 PROCEDURE — 80061 LIPID PANEL: CPT | Performed by: INTERNAL MEDICINE

## 2024-08-13 PROCEDURE — 36415 COLL VENOUS BLD VENIPUNCTURE: CPT | Performed by: INTERNAL MEDICINE

## 2024-08-13 PROCEDURE — 82043 UR ALBUMIN QUANTITATIVE: CPT | Performed by: INTERNAL MEDICINE

## 2024-08-13 PROCEDURE — G0439 PPPS, SUBSEQ VISIT: HCPCS | Performed by: INTERNAL MEDICINE

## 2024-08-13 RX ORDER — AMLODIPINE BESYLATE 10 MG/1
10 TABLET ORAL DAILY
Qty: 90 TABLET | Refills: 4 | Status: SHIPPED | OUTPATIENT
Start: 2024-08-13

## 2024-08-13 RX ORDER — LORAZEPAM 0.5 MG/1
0.5 TABLET ORAL AT BEDTIME
Status: SHIPPED
Start: 2024-08-13

## 2024-08-13 SDOH — HEALTH STABILITY: PHYSICAL HEALTH: ON AVERAGE, HOW MANY DAYS PER WEEK DO YOU ENGAGE IN MODERATE TO STRENUOUS EXERCISE (LIKE A BRISK WALK)?: 4 DAYS

## 2024-08-13 ASSESSMENT — SOCIAL DETERMINANTS OF HEALTH (SDOH): HOW OFTEN DO YOU GET TOGETHER WITH FRIENDS OR RELATIVES?: TWICE A WEEK

## 2024-08-13 NOTE — PROGRESS NOTES
Preventive Care Visit  [facility]  Wolf Marquez MD, Internal Medicine  Aug 13, 2024      1. Annual physical exam  This is an 88-year-old woman with issues as discussed below    2. Type 2 diabetes mellitus with diabetic neuropathy, without long-term current use of insulin (H)  Has been well-controlled, trial of Jardiance but I think it is causing lightheaded type symptoms and therefore I have recommended she stop this  - Lipid panel reflex to direct LDL Non-fasting; Future  - Albumin Random Urine Quantitative with Creat Ratio; Future  - Lipid panel reflex to direct LDL Non-fasting  - Albumin Random Urine Quantitative with Creat Ratio    3. Essential hypertension with goal blood pressure less than 140/90  She is experiencing some lightheadedness and I would like her to stop metoprolol, stop Jardiance, continue amlodipine and losartan  - amLODIPine (NORVASC) 10 MG tablet; Take 1 tablet (10 mg) by mouth daily  Dispense: 90 tablet; Refill: 4    4. Mixed hyperlipidemia  Continue pravastatin    5. Major depressive disorder with single episode, in full remission (H24)  Stable    6. Primary osteoarthritis involving multiple joints  She had inflamed wrist with elevated inflammatory marker and good response to prednisone now back to normal    7. Anxiety  I have recommended and she is in agreement that she taper off of lorazepam.  She will decrease from 1 mg at night to 0.5 mg and we can be discussed in 1 month  - LORazepam (ATIVAN) 0.5 MG tablet; Take 1 tablet (0.5 mg) by mouth at bedtime TAKE ONE TABLET BY MOUTH AT BEDTIME    8. Primary insomnia  She will stop zolpidem, discussed sleep hygiene    9. Glaucoma - Dr. Black    10. Gastroesophageal reflux disease with esophagitis without hemorrhage  Continue omeprazole    11. Class 2 severe obesity due to excess calories with serious comorbidity and body mass index (BMI) of 38.0 to 38.9 in adult (H)    Lizy Pimentel is a 88 year old, presenting for  "the following:  Annual Visit (Pt c/o feeling \" off balance\" since last office visit, states she will feel dizzy if she stands up quickly.)        8/13/2024     1:48 PM   Additional Questions   Roomed by Robin VALLEJO RN   Accompanied by Grand daughter         Health Care Directive  Patient does not have a Health Care Directive or Living Will: Discussed advance care planning with patient; information given to patient to review.    HPI          8/13/2024   General Health   How would you rate your overall physical health? Good   Feel stress (tense, anxious, or unable to sleep) Not at all            8/13/2024   Nutrition   Diet: Low salt            8/13/2024   Exercise   Days per week of moderate/strenous exercise 4 days            8/13/2024   Social Factors   Frequency of gathering with friends or relatives Twice a week   Worry food won't last until get money to buy more No   Food not last or not have enough money for food? No   Do you have housing? (Housing is defined as stable permanent housing and does not include staying ouside in a car, in a tent, in an abandoned building, in an overnight shelter, or couch-surfing.) Yes   Are you worried about losing your housing? No   Lack of transportation? No   Unable to get utilities (heat,electricity)? No            8/13/2024   Fall Risk   Fallen 2 or more times in the past year? No   Trouble with walking or balance? Yes   Gait Speed Test (Document in seconds) 5.1   Gait Speed Test Interpretation Greater than 5.01 seconds - ABNORMAL             8/13/2024   Activities of Daily Living- Home Safety   Needs help with the following daily activites None of the above   Safety concerns in the home None of the above            8/13/2024   Dental   Dentist two times every year? Yes            8/13/2024   Hearing Screening   Hearing concerns? None of the above            8/13/2024   Driving Risk Screening   Patient/family members have concerns about driving No            8/13/2024   General " Alertness/Fatigue Screening   Have you been more tired than usual lately? No            8/13/2024   Urinary Incontinence Screening   Bothered by leaking urine in past 6 months No            8/13/2024   TB Screening   Were you born outside of the [place]? No          Today's PHQ-9 Score:       8/13/2024     1:36 PM   PHQ-9 SCORE   PHQ-9 Total Score MyChart 2 (Minimal depression)   PHQ-9 Total Score 2         8/13/2024   Substance Use   Alcohol more than 3/day or more than 7/wk No   Do you have a current opioid prescription? No   How severe/bad is pain from 1 to 10? 5/10   Do you use any other substances recreationally? No        Social History     Tobacco Use    Smoking status: Never    Smokeless tobacco: Never   Vaping Use    Vaping status: Never Used   Substance Use Topics    Alcohol use: No    Drug use: No                  Reviewed and updated as needed this visit by Provider                      Current providers sharing in care for this patient include:  Patient Care Team:  Wolf Marquez MD as PCP - General (Internal Medicine)  Wolf Marquez MD as Assigned PCP  Constance Escobedo DPM, Podiatry/Foot and Ankle Surgery as Assigned Musculoskeletal Provider  Burak Gould RPH as Pharmacist (Pharmacist)    The following health maintenance items are reviewed in Epic and correct as of today:  Health Maintenance   Topic Date Due    RSV VACCINE (Pregnancy & 60+) (1 - 1-dose 60+ series) Never done    ZOSTER IMMUNIZATION (2 of 3) 01/15/2008    DTAP/TDAP/TD IMMUNIZATION (3 - Td or Tdap) 06/06/2024    COVID-19 Vaccine (6 - 2023-24 season) 06/12/2024    LIPID  07/17/2024    MICROALBUMIN  07/17/2024    DIABETIC FOOT EXAM  07/17/2024    MEDICARE ANNUAL WELLNESS VISIT  07/17/2024    INFLUENZA VACCINE (1) 09/01/2024    EYE EXAM  11/20/2024    A1C  12/21/2024    PHQ-9  02/13/2025    BMP  06/21/2025    ANNUAL REVIEW OF HM ORDERS  06/21/2025    COLORECTAL CANCER SCREENING  08/06/2025    FALL RISK ASSESSMENT  08/13/2025    ADVANCE CARE  "PLANNING  07/17/2028    DEXA  10/10/2037    DEPRESSION ACTION PLAN  Completed    Pneumococcal Vaccine: 65+ Years  Completed    IPV IMMUNIZATION  Aged Out    HPV IMMUNIZATION  Aged Out    MENINGITIS IMMUNIZATION  Aged Out    RSV MONOCLONAL ANTIBODY  Aged Out            Objective    Exam  /82 (BP Location: Left arm, Patient Position: Sitting, Cuff Size: Adult Regular)   Pulse 65   Temp 97.3  F (36.3  C) (Tympanic)   Resp 12   Ht 1.562 m (5' 1.5\")   Wt 94.6 kg (208 lb 9.6 oz)   LMP  (LMP Unknown)   SpO2 97%   BMI 38.78 kg/m     Estimated body mass index is 38.78 kg/m  as calculated from the following:    Height as of this encounter: 1.562 m (5' 1.5\").    Weight as of this encounter: 94.6 kg (208 lb 9.6 oz).    Physical Exam  EYES: Eyelids, conjunctiva, and sclera were normal. Pupils were normal. Cornea, iris, and lens were normal bilaterally.  HEAD, EARS, NOSE, MOUTH, AND THROAT: Head and face were normal. Hearing was normal to voice and the ears were normal to external exam. Nose appearance was normal and there was no discharge.   NECK: Neck appearance was normal.   RESPIRATORY: Breathing pattern was normal and the chest moved symmetrically.   Lung sounds were normal and there were no abnormal sounds.  CARDIOVASCULAR: Heart rate and rhythm were normal.  S1 and S2 were normal and there were no extra sounds or murmurs. Peripheral pulses in arms and legs were normal.  Jugular venous pressure was normal.  There was no peripheral edema.  GASTROINTESTINAL: The abdomen was normal in contour.    NEUROLOGIC: The patient was alert and oriented to person, place, time, and circumstance. Speech was normal. Cranial nerves were normal. Motor strength was normal for age.   PSYCHIATRIC:  Mood and affect were normal and the patient had normal recent and remote memory. The patient's judgment and insight were normal.      8/13/2024   Mini Cog   Clock Draw Score 0 Abnormal   3 Item Recall 2 objects recalled   Mini Cog Total " Score 2                 Signed Electronically by: Wolf Marquez MD    Answers submitted by the patient for this visit:  Patient Health Questionnaire (Submitted on 8/13/2024)  If you checked off any problems, how difficult have these problems made it for you to do your work, take care of things at home, or get along with other people?: Not difficult at all  PHQ9 TOTAL SCORE: 2

## 2024-08-13 NOTE — PATIENT INSTRUCTIONS
Patient Education   Preventive Care Advice   This is general advice given by our system to help you stay healthy. However, your care team may have specific advice just for you. Please talk to your care team about your preventive care needs.  Nutrition  Eat 5 or more servings of fruits and vegetables each day.  Try wheat bread, brown rice and whole grain pasta (instead of white bread, rice, and pasta).  Get enough calcium and vitamin D. Check the label on foods and aim for 100% of the RDA (recommended daily allowance).  Lifestyle  Exercise at least 150 minutes each week  (30 minutes a day, 5 days a week).  Do muscle strengthening activities 2 days a week. These help control your weight and prevent disease.  No smoking.  Wear sunscreen to prevent skin cancer.  Have a dental exam and cleaning every 6 months.  Yearly exams  See your health care team every year to talk about:  Any changes in your health.  Any medicines your care team has prescribed.  Preventive care, family planning, and ways to prevent chronic diseases.  Shots (vaccines)   HPV shots (up to age 26), if you've never had them before.  Hepatitis B shots (up to age 59), if you've never had them before.  COVID-19 shot: Get this shot when it's due.  Flu shot: Get a flu shot every year.  Tetanus shot: Get a tetanus shot every 10 years.  Pneumococcal, hepatitis A, and RSV shots: Ask your care team if you need these based on your risk.  Shingles shot (for age 50 and up)  General health tests  Diabetes screening:  Starting at age 35, Get screened for diabetes at least every 3 years.  If you are younger than age 35, ask your care team if you should be screened for diabetes.  Cholesterol test: At age 39, start having a cholesterol test every 5 years, or more often if advised.  Bone density scan (DEXA): At age 50, ask your care team if you should have this scan for osteoporosis (brittle bones).  Hepatitis C: Get tested at least once in your life.  STIs (sexually  transmitted infections)  Before age 24: Ask your care team if you should be screened for STIs.  After age 24: Get screened for STIs if you're at risk. You are at risk for STIs (including HIV) if:  You are sexually active with more than one person.  You don't use condoms every time.  You or a partner was diagnosed with a sexually transmitted infection.  If you are at risk for HIV, ask about PrEP medicine to prevent HIV.  Get tested for HIV at least once in your life, whether you are at risk for HIV or not.  Cancer screening tests  Cervical cancer screening: If you have a cervix, begin getting regular cervical cancer screening tests starting at age 21.  Breast cancer scan (mammogram): If you've ever had breasts, begin having regular mammograms starting at age 40. This is a scan to check for breast cancer.  Colon cancer screening: It is important to start screening for colon cancer at age 45.  Have a colonoscopy test every 10 years (or more often if you're at risk) Or, ask your provider about stool tests like a FIT test every year or Cologuard test every 3 years.  To learn more about your testing options, visit:   .  For help making a decision, visit:   https://bit.ly/hm26283.  Prostate cancer screening test: If you have a prostate, ask your care team if a prostate cancer screening test (PSA) at age 55 is right for you.  Lung cancer screening: If you are a current or former smoker ages 50 to 80, ask your care team if ongoing lung cancer screenings are right for you.  For informational purposes only. Not to replace the advice of your health care provider. Copyright   2023 [facility]. All rights reserved. Clinically reviewed by the [facility] Transitions Program. [employer] [id number] - REV 01/24.  Preventing Falls: Care Instructions  Injuries and health problems such as trouble walking or poor eyesight can increase your risk of falling. So can some medicines. But there are things you can do to help  "prevent falls. You can exercise to get stronger. You can also arrange your home to make it safer.    Talk to your doctor about the medicines you take. Ask if any of them increase the risk of falls and whether they can be changed or stopped.   Try to exercise regularly. It can help improve your strength and balance. This can help lower your risk of falling.     Practice fall safety and prevention.    Wear low-heeled shoes that fit well and give your feet good support. Talk to your doctor if you have foot problems that make this hard.  Carry a cellphone or wear a medical alert device that you can use to call for help.  Use stepladders instead of chairs to reach high objects. Don't climb if you're at risk for falls. Ask for help, if needed.  Wear the correct eyeglasses, if you need them.    Make your home safer.    Remove rugs, cords, clutter, and furniture from walkways.  Keep your house well lit. Use night-lights in hallways and bathrooms.  Install and use sturdy handrails on stairways.  Wear nonskid footwear, even inside. Don't walk barefoot or in socks without shoes.    Be safe outside.    Use handrails, curb cuts, and ramps whenever possible.  Keep your hands free by using a shoulder bag or backpack.  Try to walk in well-lit areas. Watch out for uneven ground, changes in pavement, and debris.  Be careful in the winter. Walk on the grass or gravel when sidewalks are slippery. Use de-icer on steps and walkways. Add non-slip devices to shoes.    Put grab bars and nonskid mats in your shower or tub and near the toilet. Try to use a shower chair or bath bench when bathing.   Get into a tub or shower by putting in your weaker leg first. Get out with your strong side first. Have a phone or medical alert device in the bathroom with you.   Where can you learn more?  Go to https://www.[employer].net/patiented  Enter G117 in the search box to learn more about \"Preventing Falls: Care Instructions.\"  Current as of: July 17, " 2023               Content Version: 14.0    4971-2519 [employer].   Care instructions adapted under license by your healthcare professional. If you have questions about a medical condition or this instruction, always ask your healthcare professional. [employer] disclaims any warranty or liability for your use of this information.

## 2024-08-14 LAB
CHOLEST SERPL-MCNC: 208 MG/DL
CREAT UR-MCNC: 89.8 MG/DL
FASTING STATUS PATIENT QL REPORTED: ABNORMAL
HDLC SERPL-MCNC: 65 MG/DL
LDLC SERPL CALC-MCNC: 129 MG/DL
MICROALBUMIN UR-MCNC: <12 MG/L
MICROALBUMIN/CREAT UR: NORMAL MG/G{CREAT}
NONHDLC SERPL-MCNC: 143 MG/DL
TRIGL SERPL-MCNC: 72 MG/DL

## 2024-09-11 ENCOUNTER — OFFICE VISIT (OUTPATIENT)
Dept: INTERNAL MEDICINE | Facility: CLINIC | Age: 89
End: 2024-09-11
Payer: COMMERCIAL

## 2024-09-11 VITALS
RESPIRATION RATE: 14 BRPM | BODY MASS INDEX: 37.54 KG/M2 | DIASTOLIC BLOOD PRESSURE: 76 MMHG | HEART RATE: 75 BPM | HEIGHT: 62 IN | OXYGEN SATURATION: 98 % | SYSTOLIC BLOOD PRESSURE: 187 MMHG | TEMPERATURE: 97.8 F | WEIGHT: 204 LBS

## 2024-09-11 DIAGNOSIS — G56.01 CARPAL TUNNEL SYNDROME OF RIGHT WRIST: ICD-10-CM

## 2024-09-11 DIAGNOSIS — E66.812 CLASS 2 SEVERE OBESITY DUE TO EXCESS CALORIES WITH SERIOUS COMORBIDITY AND BODY MASS INDEX (BMI) OF 38.0 TO 38.9 IN ADULT (H): ICD-10-CM

## 2024-09-11 DIAGNOSIS — I10 ESSENTIAL HYPERTENSION WITH GOAL BLOOD PRESSURE LESS THAN 140/90: ICD-10-CM

## 2024-09-11 DIAGNOSIS — E66.01 CLASS 2 SEVERE OBESITY DUE TO EXCESS CALORIES WITH SERIOUS COMORBIDITY AND BODY MASS INDEX (BMI) OF 38.0 TO 38.9 IN ADULT (H): ICD-10-CM

## 2024-09-11 DIAGNOSIS — H40.1134 PRIMARY OPEN ANGLE GLAUCOMA (POAG) OF BOTH EYES, INDETERMINATE STAGE: ICD-10-CM

## 2024-09-11 DIAGNOSIS — E11.42 DIABETIC PERIPHERAL NEUROPATHY (H): ICD-10-CM

## 2024-09-11 DIAGNOSIS — R26.89 POOR BALANCE: Primary | ICD-10-CM

## 2024-09-11 LAB
ANION GAP SERPL CALCULATED.3IONS-SCNC: 10 MMOL/L (ref 7–15)
BUN SERPL-MCNC: 10.8 MG/DL (ref 8–23)
CALCIUM SERPL-MCNC: 9.3 MG/DL (ref 8.8–10.4)
CHLORIDE SERPL-SCNC: 104 MMOL/L (ref 98–107)
CREAT SERPL-MCNC: 0.74 MG/DL (ref 0.51–0.95)
EGFRCR SERPLBLD CKD-EPI 2021: 78 ML/MIN/1.73M2
ERYTHROCYTE [DISTWIDTH] IN BLOOD BY AUTOMATED COUNT: 14.6 % (ref 10–15)
GLUCOSE SERPL-MCNC: 133 MG/DL (ref 70–99)
HCO3 SERPL-SCNC: 27 MMOL/L (ref 22–29)
HCT VFR BLD AUTO: 42.3 % (ref 35–47)
HGB BLD-MCNC: 13 G/DL (ref 11.7–15.7)
MCH RBC QN AUTO: 25.6 PG (ref 26.5–33)
MCHC RBC AUTO-ENTMCNC: 30.7 G/DL (ref 31.5–36.5)
MCV RBC AUTO: 83 FL (ref 78–100)
PLATELET # BLD AUTO: 273 10E3/UL (ref 150–450)
POTASSIUM SERPL-SCNC: 4.4 MMOL/L (ref 3.4–5.3)
RBC # BLD AUTO: 5.07 10E6/UL (ref 3.8–5.2)
SODIUM SERPL-SCNC: 141 MMOL/L (ref 135–145)
WBC # BLD AUTO: 4.3 10E3/UL (ref 4–11)

## 2024-09-11 PROCEDURE — 90662 IIV NO PRSV INCREASED AG IM: CPT | Performed by: INTERNAL MEDICINE

## 2024-09-11 PROCEDURE — 80048 BASIC METABOLIC PNL TOTAL CA: CPT | Performed by: INTERNAL MEDICINE

## 2024-09-11 PROCEDURE — 99214 OFFICE O/P EST MOD 30 MIN: CPT | Mod: 25 | Performed by: INTERNAL MEDICINE

## 2024-09-11 PROCEDURE — 36415 COLL VENOUS BLD VENIPUNCTURE: CPT | Performed by: INTERNAL MEDICINE

## 2024-09-11 PROCEDURE — G0008 ADMIN INFLUENZA VIRUS VAC: HCPCS | Performed by: INTERNAL MEDICINE

## 2024-09-11 PROCEDURE — 85027 COMPLETE CBC AUTOMATED: CPT | Performed by: INTERNAL MEDICINE

## 2024-09-11 NOTE — PROGRESS NOTES
"  Office Visit - Follow Up   Loren Marie   88 year old female    Date of Visit: 9/11/2024    Chief Complaint   Patient presents with    Follow Up     Follow up on medications and dizziness-off balance.        Assessment and Plan   1. Poor balance  She is not really describing dizziness or vertigo.  Mainly poor balance which is likely multifactorial including poor vision, diabetic peripheral neuropathy, degenerative arthritis, inner ear, and muscle atrophy.  Discussed strength training, discussed using mobility aid    2. Glaucoma - Dr. Black    3. Diabetic peripheral neuropathy (H)    4. Essential hypertension with goal blood pressure less than 140/90  Pressure a bit elevated but she has not been taking amlodipine she will restart this and check her blood pressures and let me know in a few weeks to a month  - Basic metabolic panel  (Ca, Cl, CO2, Creat, Gluc, K, Na, BUN); Future  - CBC with platelets; Future  - Basic metabolic panel  (Ca, Cl, CO2, Creat, Gluc, K, Na, BUN)  - CBC with platelets    5. Carpal tunnel syndrome of right wrist  - Wrist/Arm/Hand Bracing Supplies Order Wrist Brace; Right; non-thumb spica    6. Class 2 severe obesity due to excess calories with serious comorbidity and body mass index (BMI) of 38.0 to 38.9 in adult (H)  Continue to focus on healthy diet      Return in about 3 months (around 12/11/2024) for Follow up.     History of Present Illness   This 88 year old comes in for follow-up.  She is describing poor balance.  Not really dizziness or vertigo at this time.  She has some vision issues some inner ear issues some neuropathy in her feet and arthritis and muscle atrophy.       Physical Exam   General Appearance:   No acute distress    BP (!) 187/76   Pulse 75   Temp 97.8  F (36.6  C) (Tympanic)   Resp 14   Ht 1.562 m (5' 1.5\")   Wt 92.5 kg (204 lb)   LMP  (LMP Unknown)   SpO2 98%   BMI 37.92 kg/m      She is a bit unsteady on her feet     Additional Information   Current " Outpatient Medications   Medication Sig Dispense Refill    amLODIPine (NORVASC) 10 MG tablet Take 1 tablet (10 mg) by mouth daily 90 tablet 4    cholecalciferol, vitamin D3, (VITAMIN D3 ORAL) [CHOLECALCIFEROL, VITAMIN D3, (VITAMIN D3 ORAL)] Take 2,000 Units by mouth daily.      latanoprost (XALATAN) 0.005 % ophthalmic solution [LATANOPROST (XALATAN) 0.005 % OPHTHALMIC SOLUTION] Use as directed in both eyes daily 2.5 mL 6    LORazepam (ATIVAN) 0.5 MG tablet Take 1 tablet (0.5 mg) by mouth at bedtime TAKE ONE TABLET BY MOUTH AT BEDTIME      losartan (COZAAR) 100 MG tablet Take 1 tablet (100 mg) by mouth daily 90 tablet 4    meclizine (ANTIVERT) 25 MG tablet Take 1 tablet (25 mg) by mouth 3 times daily as needed for dizziness 30 tablet 1    multivitamin-minerals-lutein (CENTRUM SILVER) tablet [MULTIVITAMIN-MINERALS-LUTEIN (CENTRUM SILVER) TABLET] Take 1 tablet by mouth daily.      omeprazole (PRILOSEC) 20 MG DR capsule Take 1 capsule (20 mg) by mouth daily 90 capsule 2    pravastatin (PRAVACHOL) 20 MG tablet Take 1 tablet (20 mg) by mouth daily 90 tablet 4       Time:     The longitudinal plan of care for the diagnosis(es)/condition(s) as documented were addressed during this visit. Due to the added complexity in care, I will continue to support Loren in the subsequent management and with ongoing continuity of care.     Wolf Marquez MD  Answers submitted by the patient for this visit:  General Questionnaire (Submitted on 9/11/2024)  Chief Complaint: Chronic problems general questions HPI Form  What is the reason for your visit today? : dizziness and meds  How many servings of fruits and vegetables do you eat daily?: 4 or more  On average, how many sweetened beverages do you drink each day (Examples: soda, juice, sweet tea, etc.  Do NOT count diet or artificially sweetened beverages)?: 0  How many minutes a day do you exercise enough to make your heart beat faster?: 30 to 60  How many days a week do you exercise  enough to make your heart beat faster?: 5  How many days per week do you miss taking your medication?: 3

## 2024-09-11 NOTE — PROGRESS NOTES
DME (Durable Medical Equipment) Orders and Documentation  Orders Placed This Encounter   Procedures    Wrist/Arm/Hand Bracing Supplies Order Wrist Brace; Right; non-thumb spica        The patient was assessed and it was determined the patient is in need of the following listed DME Supplies/Equipment. Please complete supporting documentation below to demonstrate medical necessity.         Answers submitted by the patient for this visit:  General Questionnaire (Submitted on 9/11/2024)  Chief Complaint: Chronic problems general questions HPI Form  What is the reason for your visit today? : dizziness and meds  How many servings of fruits and vegetables do you eat daily?: 4 or more  On average, how many sweetened beverages do you drink each day (Examples: soda, juice, sweet tea, etc.  Do NOT count diet or artificially sweetened beverages)?: 0  How many minutes a day do you exercise enough to make your heart beat faster?: 30 to 60  How many days a week do you exercise enough to make your heart beat faster?: 5  How many days per week do you miss taking your medication?: 3

## 2024-09-22 DIAGNOSIS — F51.01 PRIMARY INSOMNIA: ICD-10-CM

## 2024-09-22 DIAGNOSIS — F41.9 ANXIETY: ICD-10-CM

## 2024-09-23 RX ORDER — ZOLPIDEM TARTRATE 5 MG/1
5 TABLET ORAL AT BEDTIME
Qty: 90 TABLET | Refills: 0 | OUTPATIENT
Start: 2024-09-23

## 2024-10-31 DIAGNOSIS — I10 ESSENTIAL HYPERTENSION: ICD-10-CM

## 2024-10-31 RX ORDER — LOSARTAN POTASSIUM 100 MG/1
100 TABLET ORAL DAILY
Qty: 90 TABLET | Refills: 0 | OUTPATIENT
Start: 2024-10-31

## 2024-11-25 DIAGNOSIS — I10 ESSENTIAL HYPERTENSION: ICD-10-CM

## 2024-11-25 RX ORDER — LOSARTAN POTASSIUM 100 MG/1
100 TABLET ORAL DAILY
Qty: 90 TABLET | Refills: 0 | OUTPATIENT
Start: 2024-11-25

## 2024-11-25 NOTE — TELEPHONE ENCOUNTER
When patient returns call, please ask if she picked up the refill that would have been due in November.  If patient did get this refill, she should have almost 90 days of medication.  Please let her know she can request a prescription renewal 30 days or less from the end of her prescription (January 7th or later).  If patient is out of medication and pharmacy cannot fill her prescription, please sent request to PCP.

## 2024-11-27 RX ORDER — LOSARTAN POTASSIUM 100 MG/1
100 TABLET ORAL DAILY
Qty: 90 TABLET | Refills: 4 | OUTPATIENT
Start: 2024-11-27

## 2024-11-27 NOTE — TELEPHONE ENCOUNTER
Per patient Mychart message- this is being worked out with the pharmacy as it was accidentally transferred. Rx denied.

## 2024-11-30 DIAGNOSIS — I10 ESSENTIAL HYPERTENSION: ICD-10-CM

## 2024-11-30 RX ORDER — LOSARTAN POTASSIUM 100 MG/1
100 TABLET ORAL DAILY
Qty: 90 TABLET | Refills: 0 | Status: SHIPPED | OUTPATIENT
Start: 2024-11-30

## 2024-11-30 NOTE — PROGRESS NOTES
[facility] pharmacy at [facility], [place] - [address] [facility]  call requesting a refill on Losartan 100 mg.   Chart reviewed and #90 tabs refill sent.

## 2025-01-01 ENCOUNTER — TRANSFERRED RECORDS (OUTPATIENT)
Dept: MULTI SPECIALTY CLINIC | Facility: CLINIC | Age: 89
End: 2025-01-01

## 2025-01-01 LAB — RETINOPATHY: NORMAL

## 2025-01-18 ENCOUNTER — HEALTH MAINTENANCE LETTER (OUTPATIENT)
Age: 89
End: 2025-01-18

## 2025-03-06 DIAGNOSIS — I10 ESSENTIAL HYPERTENSION: ICD-10-CM

## 2025-03-06 RX ORDER — LOSARTAN POTASSIUM 100 MG/1
100 TABLET ORAL DAILY
Qty: 90 TABLET | Refills: 4 | Status: SHIPPED | OUTPATIENT
Start: 2025-03-06

## 2025-03-06 NOTE — TELEPHONE ENCOUNTER
[facility]. Will send request to [facility] RN's to review as pt is seen there.       Artis Rios Jr., CMA on 3/6/2025 at 7:54 AM

## 2025-03-19 ASSESSMENT — PATIENT HEALTH QUESTIONNAIRE - PHQ9
SUM OF ALL RESPONSES TO PHQ QUESTIONS 1-9: 6
SUM OF ALL RESPONSES TO PHQ QUESTIONS 1-9: 6
10. IF YOU CHECKED OFF ANY PROBLEMS, HOW DIFFICULT HAVE THESE PROBLEMS MADE IT FOR YOU TO DO YOUR WORK, TAKE CARE OF THINGS AT HOME, OR GET ALONG WITH OTHER PEOPLE: SOMEWHAT DIFFICULT

## 2025-03-19 ASSESSMENT — ANXIETY QUESTIONNAIRES
2. NOT BEING ABLE TO STOP OR CONTROL WORRYING: NOT AT ALL
1. FEELING NERVOUS, ANXIOUS, OR ON EDGE: NOT AT ALL
6. BECOMING EASILY ANNOYED OR IRRITABLE: NOT AT ALL
3. WORRYING TOO MUCH ABOUT DIFFERENT THINGS: SEVERAL DAYS
GAD7 TOTAL SCORE: 1
5. BEING SO RESTLESS THAT IT IS HARD TO SIT STILL: NOT AT ALL
GAD7 TOTAL SCORE: 1
8. IF YOU CHECKED OFF ANY PROBLEMS, HOW DIFFICULT HAVE THESE MADE IT FOR YOU TO DO YOUR WORK, TAKE CARE OF THINGS AT HOME, OR GET ALONG WITH OTHER PEOPLE?: NOT DIFFICULT AT ALL
7. FEELING AFRAID AS IF SOMETHING AWFUL MIGHT HAPPEN: NOT AT ALL
7. FEELING AFRAID AS IF SOMETHING AWFUL MIGHT HAPPEN: NOT AT ALL
IF YOU CHECKED OFF ANY PROBLEMS ON THIS QUESTIONNAIRE, HOW DIFFICULT HAVE THESE PROBLEMS MADE IT FOR YOU TO DO YOUR WORK, TAKE CARE OF THINGS AT HOME, OR GET ALONG WITH OTHER PEOPLE: NOT DIFFICULT AT ALL
GAD7 TOTAL SCORE: 1
4. TROUBLE RELAXING: NOT AT ALL

## 2025-03-20 ENCOUNTER — OFFICE VISIT (OUTPATIENT)
Dept: INTERNAL MEDICINE | Facility: CLINIC | Age: 89
End: 2025-03-20
Payer: COMMERCIAL

## 2025-03-20 VITALS
DIASTOLIC BLOOD PRESSURE: 82 MMHG | WEIGHT: 206 LBS | OXYGEN SATURATION: 99 % | RESPIRATION RATE: 12 BRPM | HEART RATE: 72 BPM | TEMPERATURE: 98.2 F | SYSTOLIC BLOOD PRESSURE: 160 MMHG | HEIGHT: 62 IN | BODY MASS INDEX: 37.91 KG/M2

## 2025-03-20 DIAGNOSIS — F41.9 ANXIETY: ICD-10-CM

## 2025-03-20 DIAGNOSIS — H53.8 BLURRED VISION: ICD-10-CM

## 2025-03-20 DIAGNOSIS — F51.01 PRIMARY INSOMNIA: ICD-10-CM

## 2025-03-20 DIAGNOSIS — E66.812 CLASS 2 SEVERE OBESITY DUE TO EXCESS CALORIES WITH SERIOUS COMORBIDITY AND BODY MASS INDEX (BMI) OF 38.0 TO 38.9 IN ADULT (H): ICD-10-CM

## 2025-03-20 DIAGNOSIS — E11.40 TYPE 2 DIABETES MELLITUS WITH DIABETIC NEUROPATHY, WITHOUT LONG-TERM CURRENT USE OF INSULIN (H): ICD-10-CM

## 2025-03-20 DIAGNOSIS — R42 DIZZINESS: Primary | ICD-10-CM

## 2025-03-20 DIAGNOSIS — I10 ESSENTIAL HYPERTENSION WITH GOAL BLOOD PRESSURE LESS THAN 140/90: ICD-10-CM

## 2025-03-20 DIAGNOSIS — E66.01 CLASS 2 SEVERE OBESITY DUE TO EXCESS CALORIES WITH SERIOUS COMORBIDITY AND BODY MASS INDEX (BMI) OF 38.0 TO 38.9 IN ADULT (H): ICD-10-CM

## 2025-03-20 DIAGNOSIS — R26.89 POOR BALANCE: ICD-10-CM

## 2025-03-20 LAB
ERYTHROCYTE [DISTWIDTH] IN BLOOD BY AUTOMATED COUNT: 14.3 % (ref 10–15)
EST. AVERAGE GLUCOSE BLD GHB EST-MCNC: 146 MG/DL
HBA1C MFR BLD: 6.7 % (ref 0–5.6)
HCT VFR BLD AUTO: 42 % (ref 35–47)
HGB BLD-MCNC: 13.6 G/DL (ref 11.7–15.7)
MCH RBC QN AUTO: 26.8 PG (ref 26.5–33)
MCHC RBC AUTO-ENTMCNC: 32.4 G/DL (ref 31.5–36.5)
MCV RBC AUTO: 83 FL (ref 78–100)
PLATELET # BLD AUTO: 275 10E3/UL (ref 150–450)
RBC # BLD AUTO: 5.07 10E6/UL (ref 3.8–5.2)
WBC # BLD AUTO: 5.2 10E3/UL (ref 4–11)

## 2025-03-20 RX ORDER — MECLIZINE HYDROCHLORIDE 25 MG/1
25 TABLET ORAL 3 TIMES DAILY PRN
Qty: 30 TABLET | Refills: 1 | Status: SHIPPED | OUTPATIENT
Start: 2025-03-20

## 2025-03-20 RX ORDER — CARVEDILOL 3.12 MG/1
3.12 TABLET ORAL 2 TIMES DAILY WITH MEALS
Qty: 180 TABLET | Refills: 4 | Status: SHIPPED | OUTPATIENT
Start: 2025-03-20

## 2025-03-20 RX ORDER — LORAZEPAM 0.5 MG/1
0.5 TABLET ORAL AT BEDTIME
Qty: 90 TABLET | Refills: 1 | Status: SHIPPED | OUTPATIENT
Start: 2025-03-20

## 2025-03-20 ASSESSMENT — PAIN SCALES - GENERAL: PAINLEVEL_OUTOF10: MILD PAIN (3)

## 2025-03-20 NOTE — PROGRESS NOTES
Office Visit - Follow Up   Loren Marie   89 year old female    Date of Visit: 3/20/2025    Chief Complaint   Patient presents with    Dizziness     Patient states she is having blurry vision with dizziness, this has been going on for a while. Says she thinks she's walking straight, but then she isn't walking straight. Not sleeping well at all.          Assessment and Plan   1. Dizziness (Primary)  Her last MRI was 2007 with ongoing balance and dizzy issues with vision changes we will obtain an MRI of the brain.  She will also see Dr. Black on Monday  - MR Brain w/o Contrast; Future    2. Poor balance  - MR Brain w/o Contrast; Future    3. Blurred vision  - MR Brain w/o Contrast; Future    4. Essential hypertension with goal blood pressure less than 140/90  Blood pressure elevated add low-dose carvedilol follow-up 1 month  - carvedilol (COREG) 3.125 MG tablet; Take 1 tablet (3.125 mg) by mouth 2 times daily (with meals).  Dispense: 180 tablet; Refill: 4  - Basic metabolic panel  (Ca, Cl, CO2, Creat, Gluc, K, Na, BUN); Future  - CBC with platelets; Future    5. Type 2 diabetes mellitus with diabetic neuropathy, without long-term current use of insulin (H)  -Well-controlled continue same  - HEMOGLOBIN A1C; Future    6. Primary insomnia  Encouraged her to consolidate sleep, decrease amount of time she attempts to sleep from over 12 hours down to 6 or 7 hours.  - LORazepam (ATIVAN) 0.5 MG tablet; Take 1 tablet (0.5 mg) by mouth at bedtime. TAKE ONE TABLET BY MOUTH AT BEDTIME  Dispense: 90 tablet; Refill: 1    7. Anxiety  - LORazepam (ATIVAN) 0.5 MG tablet; Take 1 tablet (0.5 mg) by mouth at bedtime. TAKE ONE TABLET BY MOUTH AT BEDTIME  Dispense: 90 tablet; Refill: 1    8. Class 2 severe obesity due to excess calories with serious comorbidity and body mass index (BMI) of 38.0 to 38.9 in adult (H)  Continue to focus on healthy diet and regular exercise    Return in about 4 weeks (around 4/17/2025) for Follow up.  "    History of Present Illness   This 89 year old woman comes in with her granddaughter for follow-up.  She has had worsening blurry vision has an appointment with Dr. Black on Monday.  She feels like the vision is cloudy.  She has known glaucoma and had a procedure last year to lower pressures been using drops.  She also continues to feel dizzy which is a long-term problem for her.  Her blood pressure has been elevated.  She is not sleeping well at night although she spends over 12 hours in bed.       Physical Exam   General Appearance:   No acute distress    BP (!) 160/82 (BP Location: Left arm, Patient Position: Sitting, Cuff Size: Adult Regular)   Pulse 72   Temp 98.2  F (36.8  C) (Temporal)   Resp 12   Ht 1.562 m (5' 1.5\")   Wt 93.4 kg (206 lb)   LMP  (LMP Unknown)   SpO2 99%   BMI 38.29 kg/m      Neurological exam nonfocal     Additional Information   Current Outpatient Medications   Medication Sig Dispense Refill    amLODIPine (NORVASC) 10 MG tablet Take 1 tablet (10 mg) by mouth daily 90 tablet 4    carvedilol (COREG) 3.125 MG tablet Take 1 tablet (3.125 mg) by mouth 2 times daily (with meals). 180 tablet 4    cholecalciferol, vitamin D3, (VITAMIN D3 ORAL) [CHOLECALCIFEROL, VITAMIN D3, (VITAMIN D3 ORAL)] Take 2,000 Units by mouth daily.      latanoprost (XALATAN) 0.005 % ophthalmic solution [LATANOPROST (XALATAN) 0.005 % OPHTHALMIC SOLUTION] Use as directed in both eyes daily 2.5 mL 6    LORazepam (ATIVAN) 0.5 MG tablet Take 1 tablet (0.5 mg) by mouth at bedtime. TAKE ONE TABLET BY MOUTH AT BEDTIME 90 tablet 1    losartan (COZAAR) 100 MG tablet Take 1 tablet (100 mg) by mouth daily. 90 tablet 4    meclizine (ANTIVERT) 25 MG tablet Take 1 tablet (25 mg) by mouth 3 times daily as needed for dizziness. 30 tablet 1    multivitamin-minerals-lutein (CENTRUM SILVER) tablet [MULTIVITAMIN-MINERALS-LUTEIN (CENTRUM SILVER) TABLET] Take 1 tablet by mouth daily.      omeprazole (PRILOSEC) 20 MG DR capsule Take 1 " capsule (20 mg) by mouth daily 90 capsule 2    pravastatin (PRAVACHOL) 20 MG tablet Take 1 tablet (20 mg) by mouth daily 90 tablet 4       Time:     The longitudinal plan of care for the diagnosis(es)/condition(s) as documented were addressed during this visit. Due to the added complexity in care, I will continue to support Loren in the subsequent management and with ongoing continuity of care.     Wolf Marquez MD  Answers submitted by the patient for this visit:  Patient Health Questionnaire (Submitted on 3/19/2025)  If you checked off any problems, how difficult have these problems made it for you to do your work, take care of things at home, or get along with other people?: Somewhat difficult  PHQ9 TOTAL SCORE: 6  Patient Health Questionnaire (G7) (Submitted on 3/19/2025)  DONNY 7 TOTAL SCORE: 1  Depression / Anxiety Questionnaire (Submitted on 3/19/2025)  Chief Complaint: Chronic problems general questions HPI Form  Depression/Anxiety: Anxiety  Anxiety only (Submitted on 3/19/2025)  Chief Complaint: Chronic problems general questions HPI Form  Anxiety since last: : no change  Other associated symotome: : No  Significant life event: : No  Anxious:: No  Current substance use:: No  General Questionnaire (Submitted on 3/19/2025)  Chief Complaint: Chronic problems general questions HPI Form  What is the reason for your visit today? : Not sleeping and feeling like i have an infection. Had dental work completed. I would like lab work completed.  How many servings of fruits and vegetables do you eat daily?: 4 or more  On average, how many sweetened beverages do you drink each day (Examples: soda, juice, sweet tea, etc.  Do NOT count diet or artificially sweetened beverages)?: 0  How many minutes a day do you exercise enough to make your heart beat faster?: 9 or less  How many days a week do you exercise enough to make your heart beat faster?: 3 or less  How many days per week do you miss taking your medication?:  0  Questionnaire about: Chronic problems general questions HPI Form (Submitted on 3/19/2025)  Chief Complaint: Chronic problems general questions HPI Form

## 2025-06-03 ENCOUNTER — E-VISIT (OUTPATIENT)
Dept: INTERNAL MEDICINE | Facility: CLINIC | Age: 89
End: 2025-06-03
Payer: COMMERCIAL

## 2025-06-03 DIAGNOSIS — R53.81 DEBILITY: Primary | ICD-10-CM

## 2025-06-03 PROCEDURE — 99207 PR NON-BILLABLE SERV PER CHARTING: CPT | Performed by: INTERNAL MEDICINE

## 2025-06-05 ENCOUNTER — PATIENT OUTREACH (OUTPATIENT)
Dept: CARE COORDINATION | Facility: CLINIC | Age: 89
End: 2025-06-05
Payer: COMMERCIAL

## 2025-06-05 NOTE — PROGRESS NOTES
Clinic Care Coordination Contact  Community Health Worker Initial Outreach      Patient accepts CC: No, patient just needs some paperwork filled out by the PCP stating that they would qualify from a PCA. They declined to talk to a SW/RN at this moment. Patient will be sent Care Coordination introduction letter for future reference.       Usha Kumar [facility] Care Coordination   [facility]   Office: [phone]

## 2025-06-05 NOTE — LETTER
[facility] CARE COORDINATION  [address] [address]    June 5, 2025    Loren Marie  [address] [place] [address]      Dear Loren,    I am a clinic community health worker who works with Wolf Marquez MD with the [facility]. I wanted to thank you for spending the time to talk with me.  Below is a description of clinic care coordination and how I can further assist you.       The clinic care coordination team is made up of a registered nurse, , financial resource worker and community health worker who understand the health care system. The goal of clinic care coordination is to help you manage your health and improve access to the health care system. Our team works alongside your provider to assist you in determining your health and social needs. We can help you obtain health care and community resources, providing you with necessary information and education. We can work with you through any barriers and develop a care plan that helps coordinate and strengthen the communication between you and your care team.  Our services are voluntary and are offered without charge to you personally.    Please feel free to contact me with any questions or concerns regarding care coordination and what we can offer.      We are focused on providing you with the highest-quality healthcare experience possible.    Sincerely,     [facility]  [facility] Care Coordination   [facility]   Office: [phone]

## 2025-06-06 ENCOUNTER — TRANSFERRED RECORDS (OUTPATIENT)
Dept: HEALTH INFORMATION MANAGEMENT | Facility: CLINIC | Age: 89
End: 2025-06-06
Payer: COMMERCIAL

## 2025-06-14 DIAGNOSIS — I10 ESSENTIAL HYPERTENSION WITH GOAL BLOOD PRESSURE LESS THAN 140/90: ICD-10-CM

## 2025-06-17 RX ORDER — METOPROLOL SUCCINATE 50 MG/1
50 TABLET, EXTENDED RELEASE ORAL DAILY
Qty: 90 TABLET | Refills: 0 | OUTPATIENT
Start: 2025-06-17

## 2025-06-17 NOTE — TELEPHONE ENCOUNTER
Spoke to pt's granddaughter Loren and confirmed medication was discontinued.  No refill needed.     8/13/25 Visit notes:    3. Essential hypertension with goal blood pressure less than 140/90  She is experiencing some lightheadedness and I would like her to stop metoprolol, stop Jardiance, continue amlodipine and losartan

## 2025-07-02 ENCOUNTER — OFFICE VISIT (OUTPATIENT)
Dept: INTERNAL MEDICINE | Facility: CLINIC | Age: 89
End: 2025-07-02
Payer: COMMERCIAL

## 2025-07-02 VITALS
TEMPERATURE: 97.8 F | SYSTOLIC BLOOD PRESSURE: 189 MMHG | HEART RATE: 78 BPM | HEIGHT: 61 IN | OXYGEN SATURATION: 98 % | DIASTOLIC BLOOD PRESSURE: 90 MMHG | RESPIRATION RATE: 16 BRPM | BODY MASS INDEX: 38.89 KG/M2 | WEIGHT: 206 LBS

## 2025-07-02 DIAGNOSIS — H40.1134 PRIMARY OPEN ANGLE GLAUCOMA (POAG) OF BOTH EYES, INDETERMINATE STAGE: ICD-10-CM

## 2025-07-02 DIAGNOSIS — Z01.818 PREOPERATIVE EXAMINATION: Primary | ICD-10-CM

## 2025-07-02 DIAGNOSIS — E66.812 CLASS 2 SEVERE OBESITY DUE TO EXCESS CALORIES WITH SERIOUS COMORBIDITY AND BODY MASS INDEX (BMI) OF 38.0 TO 38.9 IN ADULT (H): ICD-10-CM

## 2025-07-02 DIAGNOSIS — E11.40 TYPE 2 DIABETES MELLITUS WITH DIABETIC NEUROPATHY, WITHOUT LONG-TERM CURRENT USE OF INSULIN (H): ICD-10-CM

## 2025-07-02 DIAGNOSIS — I10 ESSENTIAL HYPERTENSION WITH GOAL BLOOD PRESSURE LESS THAN 140/90: ICD-10-CM

## 2025-07-02 DIAGNOSIS — M15.0 PRIMARY OSTEOARTHRITIS INVOLVING MULTIPLE JOINTS: ICD-10-CM

## 2025-07-02 DIAGNOSIS — H90.3 SENSORINEURAL HEARING LOSS (SNHL) OF BOTH EARS: ICD-10-CM

## 2025-07-02 DIAGNOSIS — F32.5 MAJOR DEPRESSIVE DISORDER WITH SINGLE EPISODE, IN FULL REMISSION: ICD-10-CM

## 2025-07-02 DIAGNOSIS — E78.2 MIXED HYPERLIPIDEMIA: ICD-10-CM

## 2025-07-02 DIAGNOSIS — F41.9 ANXIETY: ICD-10-CM

## 2025-07-02 DIAGNOSIS — F51.01 PRIMARY INSOMNIA: ICD-10-CM

## 2025-07-02 DIAGNOSIS — E66.01 CLASS 2 SEVERE OBESITY DUE TO EXCESS CALORIES WITH SERIOUS COMORBIDITY AND BODY MASS INDEX (BMI) OF 38.0 TO 38.9 IN ADULT (H): ICD-10-CM

## 2025-07-02 DIAGNOSIS — K21.00 GASTROESOPHAGEAL REFLUX DISEASE WITH ESOPHAGITIS WITHOUT HEMORRHAGE: ICD-10-CM

## 2025-07-02 LAB
ATRIAL RATE - MUSE: 64 BPM
DIASTOLIC BLOOD PRESSURE - MUSE: NORMAL MMHG
ERYTHROCYTE [DISTWIDTH] IN BLOOD BY AUTOMATED COUNT: 14.5 % (ref 10–15)
HCT VFR BLD AUTO: 39.7 % (ref 35–47)
HGB BLD-MCNC: 12.8 G/DL (ref 11.7–15.7)
INTERPRETATION ECG - MUSE: NORMAL
MCH RBC QN AUTO: 26.9 PG (ref 26.5–33)
MCHC RBC AUTO-ENTMCNC: 32.2 G/DL (ref 31.5–36.5)
MCV RBC AUTO: 84 FL (ref 78–100)
P AXIS - MUSE: 50 DEGREES
PLATELET # BLD AUTO: 260 10E3/UL (ref 150–450)
PR INTERVAL - MUSE: 160 MS
QRS DURATION - MUSE: 76 MS
QT - MUSE: 458 MS
QTC - MUSE: 472 MS
R AXIS - MUSE: 7 DEGREES
RBC # BLD AUTO: 4.75 10E6/UL (ref 3.8–5.2)
SYSTOLIC BLOOD PRESSURE - MUSE: NORMAL MMHG
T AXIS - MUSE: 39 DEGREES
VENTRICULAR RATE- MUSE: 64 BPM
WBC # BLD AUTO: 4.6 10E3/UL (ref 4–11)

## 2025-07-02 PROCEDURE — 85027 COMPLETE CBC AUTOMATED: CPT | Performed by: INTERNAL MEDICINE

## 2025-07-02 PROCEDURE — 93010 ELECTROCARDIOGRAM REPORT: CPT | Performed by: INTERNAL MEDICINE

## 2025-07-02 PROCEDURE — 99214 OFFICE O/P EST MOD 30 MIN: CPT | Performed by: INTERNAL MEDICINE

## 2025-07-02 PROCEDURE — 3079F DIAST BP 80-89 MM HG: CPT | Performed by: INTERNAL MEDICINE

## 2025-07-02 PROCEDURE — G2211 COMPLEX E/M VISIT ADD ON: HCPCS | Performed by: INTERNAL MEDICINE

## 2025-07-02 PROCEDURE — 80048 BASIC METABOLIC PNL TOTAL CA: CPT | Performed by: INTERNAL MEDICINE

## 2025-07-02 PROCEDURE — 93005 ELECTROCARDIOGRAM TRACING: CPT | Performed by: INTERNAL MEDICINE

## 2025-07-02 PROCEDURE — 36415 COLL VENOUS BLD VENIPUNCTURE: CPT | Performed by: INTERNAL MEDICINE

## 2025-07-02 PROCEDURE — 1126F AMNT PAIN NOTED NONE PRSNT: CPT | Performed by: INTERNAL MEDICINE

## 2025-07-02 PROCEDURE — 3077F SYST BP >= 140 MM HG: CPT | Performed by: INTERNAL MEDICINE

## 2025-07-02 RX ORDER — CARVEDILOL 6.25 MG/1
6.25 TABLET ORAL 2 TIMES DAILY WITH MEALS
Qty: 180 TABLET | Refills: 1 | Status: SHIPPED | OUTPATIENT
Start: 2025-07-02

## 2025-07-02 ASSESSMENT — PAIN SCALES - GENERAL: PAINLEVEL_OUTOF10: NO PAIN (0)

## 2025-07-02 ASSESSMENT — PATIENT HEALTH QUESTIONNAIRE - PHQ9
10. IF YOU CHECKED OFF ANY PROBLEMS, HOW DIFFICULT HAVE THESE PROBLEMS MADE IT FOR YOU TO DO YOUR WORK, TAKE CARE OF THINGS AT HOME, OR GET ALONG WITH OTHER PEOPLE: NOT DIFFICULT AT ALL
SUM OF ALL RESPONSES TO PHQ QUESTIONS 1-9: 0
SUM OF ALL RESPONSES TO PHQ QUESTIONS 1-9: 0

## 2025-07-02 NOTE — PROGRESS NOTES
Preoperative Consultation   Loren Marie   89 year old  female    Date of visit: 2025  Physician: Wolf Marquez MD    This is a preoperative consultation requested by Dr. Preston in preparation for left eye surgery on a date TBD at [facility], fax [phone].       Assessment and Plan   Loren Marie was seen in preoperative consultation in preparation for left eye surgery.  This is a low risk surgery and the patient has no increased risk for major cardiac complications.  Please note her blood pressure is elevated today.  She reports that it is better controlled at home and her granddaughter will help her get a brachial cuff.  Her goal blood pressure is less than 160 systolic, lower if tolerated and we will increase carvedilol to 6.25 mg twice daily.  They will update me in 1 week with blood pressure readings.  If we can get her blood pressure less than 160 systolic then I would recommend proceeding with surgery without further cardiopulmonary evaluation.    1. Preoperative examination    2. Glaucoma - Dr. Black    3. Essential hypertension with goal blood pressure less than 140/90    4. Type 2 diabetes mellitus with diabetic neuropathy, without long-term current use of insulin (H)    5. Mixed hyperlipidemia    6. Anxiety    7. Gastroesophageal reflux disease with esophagitis without hemorrhage    8. Major depressive disorder with single episode, in full remission    9. Primary insomnia    10. Primary osteoarthritis involving multiple joints    11. Sensorineural hearing loss (SNHL) of both ears    12. Class 2 severe obesity due to excess calories with serious comorbidity and body mass index (BMI) of 38.0 to 38.9 in adult (H)         Patient Profile   Social History     Social History Narrative      2020.  Three daughters and one son.  Numerous grandchildren and great grandchildren.    Granddaughter, Loren, handles things for her.   Originally from [place].         Past  Medical History   Patient Active Problem List   Diagnosis    Mixed hyperlipidemia    Gastroesophageal reflux disease with esophagitis without hemorrhage    Major depressive disorder with single episode, in full remission    Primary insomnia    Glaucoma - Dr. Black    Sensorineural hearing loss (SNHL) of both ears    Primary osteoarthritis involving multiple joints    Essential hypertension with goal blood pressure less than 140/90    Class 2 severe obesity due to excess calories with serious comorbidity and body mass index (BMI) of 38.0 to 38.9 in adult (H)    Anxiety    Type 2 diabetes mellitus with diabetic neuropathy, without long-term current use of insulin (H)       Past Surgical History  She has a past surgical history that includes REDUCTION OF LARGE BREAST; TOTAL ABDOM HYSTERECTOMY; KNEE SCOPE,DIAGNOSTIC; KNEE SCOPE,DIAGNOSTIC; Laparoscopic cholecystectomy (N/A, 12/01/2018); Esophagoscopy, gastroscopy, duodenoscopy (EGD), combined (N/A, 07/13/2023); Oophorectomy (1969); Cataract Extraction (Bilateral); Eye surgery; and colonoscopy.     History of Present Illness   This 89 year old woman comes in for preop evaluation.  She is going to have left eye surgery but her systolic pressure was 225 mmHg.  Surgery was canceled.  She did go home and recheck her blood pressure and it was in the 130s.  She states she was a bit anxious at the time of the evaluation.    Recent antiplatelet use: no  Personal or family history of bleeding or clotting disorders: no  Steroid use in the past year: no  Personal or family history of difficulty with anesthesia: no  Current cardiopulmonary symptoms: no  MARYJO: no    Review of Systems: A comprehensive review of systems was negative except as noted.     Medications and Allergies   Current Outpatient Medications   Medication Sig Dispense Refill    acetaZOLAMIDE (DIAMOX) 250 MG tablet       amLODIPine (NORVASC) 10 MG tablet Take 1 tablet (10 mg) by mouth daily 90 tablet 4    carvedilol  (COREG) 3.125 MG tablet Take 1 tablet (3.125 mg) by mouth 2 times daily (with meals). 180 tablet 4    cholecalciferol, vitamin D3, (VITAMIN D3 ORAL) [CHOLECALCIFEROL, VITAMIN D3, (VITAMIN D3 ORAL)] Take 2,000 Units by mouth daily.      dorzolamide-timolol (COSOPT) 2-0.5 % ophthalmic solution INSTILL ONE DROP INTO EACH EYE TWICE DAILY*      latanoprost (XALATAN) 0.005 % ophthalmic solution [LATANOPROST (XALATAN) 0.005 % OPHTHALMIC SOLUTION] Use as directed in both eyes daily 2.5 mL 6    LORazepam (ATIVAN) 0.5 MG tablet Take 1 tablet (0.5 mg) by mouth at bedtime. TAKE ONE TABLET BY MOUTH AT BEDTIME 90 tablet 1    losartan (COZAAR) 100 MG tablet Take 1 tablet (100 mg) by mouth daily. 90 tablet 4    meclizine (ANTIVERT) 25 MG tablet Take 1 tablet (25 mg) by mouth 3 times daily as needed for dizziness. 30 tablet 1    multivitamin-minerals-lutein (CENTRUM SILVER) tablet [MULTIVITAMIN-MINERALS-LUTEIN (CENTRUM SILVER) TABLET] Take 1 tablet by mouth daily.      omeprazole (PRILOSEC) 20 MG DR capsule Take 1 capsule (20 mg) by mouth daily 90 capsule 2    pravastatin (PRAVACHOL) 20 MG tablet Take 1 tablet (20 mg) by mouth daily 90 tablet 4    RHOPRESSA 0.02 % ophthalmic solution        Allergies   Allergen Reactions    Aspirin (Tartrazine Only) [Tartrazine] Other (See Comments)     Abdominal pain    Codeine Nausea and Vomiting        Family and Social History   Family History   Problem Relation Age of Onset    Diabetes Mother     No Known Problems Father     Thyroid Cancer Sister     Pancreatic Cancer Sister     Myocardial Infarction Brother     Diabetes Brother     No Known Problems Brother     No Known Problems Daughter     No Known Problems Daughter     No Known Problems Daughter     No Known Problems Son         disabled         Social History     Tobacco Use    Smoking status: Never    Smokeless tobacco: Never   Vaping Use    Vaping status: Never Used   Substance Use Topics    Alcohol use: No    Drug use: No     "    Physical Exam   General Appearance:   No acute distress    BP (!) 189/90   Pulse 78   Temp 97.8  F (36.6  C) (Temporal)   Resp 16   Ht 1.562 m (5' 1.5\")   Wt 93.4 kg (206 lb)   LMP  (LMP Unknown)   SpO2 98%   BMI 38.30 kg/m      HEAD, EARS, NOSE, MOUTH, AND THROAT: Head and face were normal. Hearing was normal to voice and the ears were normal to external exam. Nose appearance was normal and there was no discharge.   NECK: Neck appearance was normal.   RESPIRATORY: Breathing pattern was normal and the chest moved symmetrically.    Lung sounds were normal and there were no abnormal sounds.  CARDIOVASCULAR: Heart rate and rhythm were normal.  S1 and S2 were normal and there were no extra sounds or murmurs.  There was no peripheral edema.  GASTROINTESTINAL: The abdomen was normal in contour.   NEUROLOGIC: The patient was alert and oriented to person, place, time, and circumstance. Speech was normal. Cranial nerves were normal. Motor strength was normal for age. The patient was normally coordinated.  PSYCHIATRIC:  Mood and affect were normal and the patient had normal recent and remote memory. The patient's judgment and insight were normal.       Additional Tests   Laboratory: BMP and CBC are pending    Radiology:     Electrocardiogram: Normal sinus rhythm, personally reviewed    The longitudinal plan of care for the diagnosis(es)/condition(s) as documented were addressed during this visit. Due to the added complexity in care, I will continue to support Loren in the subsequent management and with ongoing continuity of care.       Wolf Marquez MD  Internal Medicine  Contact me at [phone]  "

## 2025-07-03 ENCOUNTER — TELEPHONE (OUTPATIENT)
Dept: INTERNAL MEDICINE | Facility: CLINIC | Age: 89
End: 2025-07-03
Payer: COMMERCIAL

## 2025-07-03 ENCOUNTER — RESULTS FOLLOW-UP (OUTPATIENT)
Dept: INTERNAL MEDICINE | Facility: CLINIC | Age: 89
End: 2025-07-03

## 2025-07-03 LAB
ANION GAP SERPL CALCULATED.3IONS-SCNC: 9 MMOL/L (ref 7–15)
BUN SERPL-MCNC: 12.5 MG/DL (ref 8–23)
CALCIUM SERPL-MCNC: 9.2 MG/DL (ref 8.8–10.4)
CHLORIDE SERPL-SCNC: 104 MMOL/L (ref 98–107)
CREAT SERPL-MCNC: 0.72 MG/DL (ref 0.51–0.95)
EGFRCR SERPLBLD CKD-EPI 2021: 80 ML/MIN/1.73M2
GLUCOSE SERPL-MCNC: 123 MG/DL (ref 70–99)
HCO3 SERPL-SCNC: 25 MMOL/L (ref 22–29)
POTASSIUM SERPL-SCNC: 4.4 MMOL/L (ref 3.4–5.3)
SODIUM SERPL-SCNC: 138 MMOL/L (ref 135–145)

## 2025-07-03 NOTE — TELEPHONE ENCOUNTER
July 3, 2025    Patient's pre-operative physical documentation and labs have been completed by Dr. Marquez.  The pre-operative paperwork was faxed to [facility] at [phone] per instructions from the surgeon.    Neda Ch

## 2025-07-14 ENCOUNTER — PATIENT OUTREACH (OUTPATIENT)
Dept: CARE COORDINATION | Facility: CLINIC | Age: 89
End: 2025-07-14
Payer: COMMERCIAL

## 2025-07-28 ENCOUNTER — MYC MEDICAL ADVICE (OUTPATIENT)
Dept: INTERNAL MEDICINE | Facility: CLINIC | Age: 89
End: 2025-07-28
Payer: COMMERCIAL

## 2025-07-28 ENCOUNTER — PATIENT OUTREACH (OUTPATIENT)
Dept: CARE COORDINATION | Facility: CLINIC | Age: 89
End: 2025-07-28
Payer: COMMERCIAL

## 2025-07-28 DIAGNOSIS — I25.10 ASCVD (ARTERIOSCLEROTIC CARDIOVASCULAR DISEASE): ICD-10-CM

## 2025-07-28 DIAGNOSIS — R42 DIZZINESS: Primary | ICD-10-CM

## 2025-07-28 DIAGNOSIS — E11.59 TYPE 2 DIABETES MELLITUS WITH OTHER CIRCULATORY COMPLICATIONS (H): ICD-10-CM

## 2025-07-28 DIAGNOSIS — R26.89 POOR BALANCE: ICD-10-CM

## 2025-07-30 ENCOUNTER — HOSPITAL ENCOUNTER (OUTPATIENT)
Dept: ULTRASOUND IMAGING | Facility: HOSPITAL | Age: 89
Discharge: HOME | End: 2025-07-30
Attending: INTERNAL MEDICINE
Payer: COMMERCIAL

## 2025-07-30 DIAGNOSIS — R42 DIZZINESS: ICD-10-CM

## 2025-07-30 DIAGNOSIS — R26.89 POOR BALANCE: ICD-10-CM

## 2025-07-30 DIAGNOSIS — I25.10 ASCVD (ARTERIOSCLEROTIC CARDIOVASCULAR DISEASE): ICD-10-CM

## 2025-07-30 PROCEDURE — 93880 EXTRACRANIAL BILAT STUDY: CPT

## 2025-08-26 ENCOUNTER — HOSPITAL ENCOUNTER (OUTPATIENT)
Dept: ULTRASOUND IMAGING | Facility: HOSPITAL | Age: 89
Discharge: HOME | End: 2025-08-26
Attending: INTERNAL MEDICINE
Payer: COMMERCIAL

## 2025-08-26 DIAGNOSIS — E11.59 TYPE 2 DIABETES MELLITUS WITH OTHER CIRCULATORY COMPLICATIONS (H): ICD-10-CM

## 2025-08-26 DIAGNOSIS — R42 DIZZINESS: ICD-10-CM

## 2025-08-26 DIAGNOSIS — R26.89 POOR BALANCE: ICD-10-CM

## 2025-08-26 DIAGNOSIS — I25.10 ASCVD (ARTERIOSCLEROTIC CARDIOVASCULAR DISEASE): ICD-10-CM

## 2025-08-26 PROCEDURE — 93930 UPPER EXTREMITY STUDY: CPT

## (undated) DEVICE — SYR 03ML LL W/O NDL 309657

## (undated) DEVICE — PLATE GROUNDING ADULT W/CORD 9165L

## (undated) DEVICE — KIT SCOPE CLEANING ENDOSCOPY BX00719705

## (undated) DEVICE — CONNECTOR ONE-LINK INJECTION SITE LF 7N8399

## (undated) DEVICE — CATH SUCTION 14FR W/O CTRL DYND41962

## (undated) DEVICE — TUBING ENDOGATOR + H2O PORT CO

## (undated) DEVICE — KIT IV START DYNDV1431

## (undated) DEVICE — BITE BLOCK SCOPE DISP 20 X 27 000429

## (undated) DEVICE — SOL WATER IRRIG 500ML BOTTLE 2F7113

## (undated) DEVICE — SWABCAP DISINFECTANT GREEN CFF10-250

## (undated) DEVICE — TUBING SUCTION MEDI-VAC 1/4"X20' N620A - HE

## (undated) DEVICE — SUCTION CANISTER 1000ML 65651-510

## (undated) DEVICE — CANNULA NASAL COMFORT SOFT 25FT 0537

## (undated) DEVICE — KIT CONNECTOR FOR OLYMPUS ENDOSCOPES DEFENDO 100310